# Patient Record
Sex: MALE | Race: WHITE | NOT HISPANIC OR LATINO | Employment: FULL TIME | URBAN - METROPOLITAN AREA
[De-identification: names, ages, dates, MRNs, and addresses within clinical notes are randomized per-mention and may not be internally consistent; named-entity substitution may affect disease eponyms.]

---

## 2019-07-21 PROBLEM — S09.90XA CLOSED HEAD INJURY: Status: ACTIVE | Noted: 2019-07-21

## 2019-07-22 ENCOUNTER — TELEPHONE (OUTPATIENT)
Dept: NEUROSURGERY | Facility: CLINIC | Age: 59
End: 2019-07-22

## 2019-07-22 DIAGNOSIS — I62.9 INTRACRANIAL HEMORRHAGE: ICD-10-CM

## 2019-07-22 PROBLEM — S06.30AA: Status: ACTIVE | Noted: 2019-07-22

## 2019-07-22 PROBLEM — E66.9 OBESITY: Status: ACTIVE | Noted: 2019-07-22

## 2019-07-29 ENCOUNTER — HOSPITAL ENCOUNTER (OUTPATIENT)
Dept: RADIOLOGY | Facility: HOSPITAL | Age: 59
Discharge: HOME | End: 2019-07-29
Attending: NEUROLOGICAL SURGERY
Payer: COMMERCIAL

## 2019-07-29 DIAGNOSIS — I62.9 INTRACRANIAL HEMORRHAGE: ICD-10-CM

## 2019-07-29 PROCEDURE — 70450 CT HEAD/BRAIN W/O DYE: CPT | Mod: TC

## 2019-07-29 PROCEDURE — 70450 CT HEAD/BRAIN W/O DYE: CPT | Mod: 26,,, | Performed by: RADIOLOGY

## 2019-07-29 PROCEDURE — 70450 CT HEAD WITHOUT CONTRAST: ICD-10-PCS | Mod: 26,,, | Performed by: RADIOLOGY

## 2019-08-20 ENCOUNTER — TELEPHONE (OUTPATIENT)
Dept: NEUROSURGERY | Facility: CLINIC | Age: 59
End: 2019-08-20

## 2019-08-20 NOTE — TELEPHONE ENCOUNTER
This patient was seen in the hospital for intracranial hemorrhage. It looks like his last CT scan results were reviewed with him and he did not warrant any neurosurgical intervention at discharge. However, the pt and family aretiff asking about a repeat CT scan. I explained that there was no follow up required per the note, but I would ask you as well. Please advise. Thank you!

## 2019-08-20 NOTE — TELEPHONE ENCOUNTER
Patient had a repeat scan on 7/29 and Valarie called the patient with the results. No further imaging or follow-up needed.

## 2019-09-24 DIAGNOSIS — F32.A DEPRESSION, UNSPECIFIED DEPRESSION TYPE: Primary | ICD-10-CM

## 2019-09-24 RX ORDER — BUPROPION HYDROCHLORIDE 150 MG/1
150 TABLET ORAL DAILY
Qty: 30 TABLET | Refills: 2 | Status: SHIPPED | OUTPATIENT
Start: 2019-09-24 | End: 2019-12-30 | Stop reason: SDUPTHER

## 2019-12-30 DIAGNOSIS — F32.A DEPRESSION, UNSPECIFIED DEPRESSION TYPE: ICD-10-CM

## 2019-12-30 RX ORDER — BUPROPION HYDROCHLORIDE 150 MG/1
150 TABLET ORAL DAILY
Qty: 30 TABLET | Refills: 0 | Status: SHIPPED | OUTPATIENT
Start: 2019-12-30 | End: 2020-01-06

## 2019-12-30 NOTE — TELEPHONE ENCOUNTER
----- Message from Kathy Rolon sent at 12/30/2019 12:20 PM CST -----  Refill for Wellbutrin 150 mg,  Refill for the medicine that takes place of the Losartan because they are always out of the Losartan. [facility] on [address]. Pt #[phone]

## 2020-01-06 ENCOUNTER — OFFICE VISIT (OUTPATIENT)
Dept: FAMILY MEDICINE | Facility: CLINIC | Age: 60
End: 2020-01-06
Payer: COMMERCIAL

## 2020-01-06 VITALS
SYSTOLIC BLOOD PRESSURE: 154 MMHG | HEART RATE: 60 BPM | BODY MASS INDEX: 40.43 KG/M2 | HEIGHT: 74 IN | WEIGHT: 315 LBS | DIASTOLIC BLOOD PRESSURE: 88 MMHG

## 2020-01-06 DIAGNOSIS — I10 HYPERTENSION, UNSPECIFIED TYPE: ICD-10-CM

## 2020-01-06 DIAGNOSIS — F32.A DEPRESSIVE DISORDER: ICD-10-CM

## 2020-01-06 DIAGNOSIS — Z12.11 COLON CANCER SCREENING: Primary | ICD-10-CM

## 2020-01-06 DIAGNOSIS — I25.10 CORONARY ARTERY DISEASE, ANGINA PRESENCE UNSPECIFIED, UNSPECIFIED VESSEL OR LESION TYPE, UNSPECIFIED WHETHER NATIVE OR TRANSPLANTED HEART: ICD-10-CM

## 2020-01-06 DIAGNOSIS — E66.9 OBESITY, UNSPECIFIED CLASSIFICATION, UNSPECIFIED OBESITY TYPE, UNSPECIFIED WHETHER SERIOUS COMORBIDITY PRESENT: ICD-10-CM

## 2020-01-06 PROCEDURE — 3079F DIAST BP 80-89 MM HG: CPT | Mod: S$GLB,,, | Performed by: NURSE PRACTITIONER

## 2020-01-06 PROCEDURE — 99214 OFFICE O/P EST MOD 30 MIN: CPT | Mod: S$GLB,,, | Performed by: NURSE PRACTITIONER

## 2020-01-06 PROCEDURE — 3008F PR BODY MASS INDEX (BMI) DOCUMENTED: ICD-10-PCS | Mod: S$GLB,,, | Performed by: NURSE PRACTITIONER

## 2020-01-06 PROCEDURE — 3077F PR MOST RECENT SYSTOLIC BLOOD PRESSURE >= 140 MM HG: ICD-10-PCS | Mod: S$GLB,,, | Performed by: NURSE PRACTITIONER

## 2020-01-06 PROCEDURE — 3077F SYST BP >= 140 MM HG: CPT | Mod: S$GLB,,, | Performed by: NURSE PRACTITIONER

## 2020-01-06 PROCEDURE — 3008F BODY MASS INDEX DOCD: CPT | Mod: S$GLB,,, | Performed by: NURSE PRACTITIONER

## 2020-01-06 PROCEDURE — 99214 PR OFFICE/OUTPT VISIT, EST, LEVL IV, 30-39 MIN: ICD-10-PCS | Mod: S$GLB,,, | Performed by: NURSE PRACTITIONER

## 2020-01-06 PROCEDURE — 3079F PR MOST RECENT DIASTOLIC BLOOD PRESSURE 80-89 MM HG: ICD-10-PCS | Mod: S$GLB,,, | Performed by: NURSE PRACTITIONER

## 2020-01-06 RX ORDER — METOPROLOL SUCCINATE 25 MG/1
1 TABLET, EXTENDED RELEASE ORAL NIGHTLY
COMMUNITY
Start: 2019-12-28 | End: 2020-03-24 | Stop reason: SDUPTHER

## 2020-01-06 RX ORDER — BUPROPION HYDROCHLORIDE 150 MG/1
150 TABLET ORAL 2 TIMES DAILY
Qty: 180 TABLET | Refills: 2 | Status: SHIPPED | OUTPATIENT
Start: 2020-01-06 | End: 2020-05-27 | Stop reason: SDUPTHER

## 2020-01-06 RX ORDER — ATORVASTATIN CALCIUM 80 MG/1
1 TABLET, FILM COATED ORAL DAILY
COMMUNITY
Start: 2019-12-28 | End: 2020-03-02 | Stop reason: CLARIF

## 2020-01-06 RX ORDER — ASPIRIN 81 MG/1
TABLET ORAL
COMMUNITY

## 2020-01-06 RX ORDER — LOSARTAN POTASSIUM AND HYDROCHLOROTHIAZIDE 12.5; 5 MG/1; MG/1
0.05 TABLET ORAL DAILY
COMMUNITY
Start: 2019-10-28 | End: 2021-01-20 | Stop reason: SDUPTHER

## 2020-01-06 RX ORDER — LOSARTAN POTASSIUM 50 MG/1
50 TABLET ORAL DAILY
Qty: 90 TABLET | Refills: 3 | Status: SHIPPED | OUTPATIENT
Start: 2020-01-06 | End: 2020-03-04 | Stop reason: SDUPTHER

## 2020-01-06 RX ORDER — HYDROCHLOROTHIAZIDE 12.5 MG/1
12.5 TABLET ORAL DAILY
Qty: 90 TABLET | Refills: 3 | Status: SHIPPED | OUTPATIENT
Start: 2020-01-06 | End: 2020-03-04 | Stop reason: SDUPTHER

## 2020-01-06 NOTE — PROGRESS NOTES
SUBJECTIVE:    Patient ID: Dayday Rodriguez is a 59 y.o. male.    Chief Complaint: Follow-up (no bottles, wants to do Cologuard// SW)    59 year old presents for check up. Wife present during the exam. Reports has not been taking medication  As prescribed. Reports started new job and did not have insurance x 3 months. Sleeps ok. Wife reports patient being irritable at times. Not as bad as initially but still noticing.       Office Visit on 01/06/2020   Component Date Value Ref Range Status    WBC 01/06/2020 7.1  3.8 - 10.8 Thousand/uL Final    RBC 01/06/2020 5.60  4.20 - 5.80 Million/uL Final    Hemoglobin 01/06/2020 16.7  13.2 - 17.1 g/dL Final    Hematocrit 01/06/2020 48.3  38.5 - 50.0 % Final    Mean Corpuscular Volume 01/06/2020 86.3  80.0 - 100.0 fL Final    Mean Corpuscular Hemoglobin 01/06/2020 29.8  27.0 - 33.0 pg Final    Mean Corpuscular Hemoglobin Conc 01/06/2020 34.6  32.0 - 36.0 g/dL Final    RDW 01/06/2020 13.3  11.0 - 15.0 % Final    Platelets 01/06/2020 217  140 - 400 Thousand/uL Final    MPV 01/06/2020 10.9  7.5 - 12.5 fL Final    Neutrophils Absolute 01/06/2020 4,651  1,500 - 7,800 cells/uL Final    Lymph # 01/06/2020 1,754  850 - 3,900 cells/uL Final    Mono # 01/06/2020 625  200 - 950 cells/uL Final    Eos # 01/06/2020 43  15 - 500 cells/uL Final    Baso # 01/06/2020 28  0 - 200 cells/uL Final    Neutrophils Relative 01/06/2020 65.5  % Final    Lymph% 01/06/2020 24.7  % Final    Mono% 01/06/2020 8.8  % Final    Eosinophil% 01/06/2020 0.6  % Final    Basophil% 01/06/2020 0.4  % Final    Glucose 01/06/2020 100* 65 - 99 mg/dL Final    BUN, Bld 01/06/2020 15  7 - 25 mg/dL Final    Creatinine 01/06/2020 0.94  0.70 - 1.33 mg/dL Final    eGFR if non African American 01/06/2020 88  > OR = 60 mL/min/1.73m2 Final    eGFR if African American 01/06/2020 102  > OR = 60 mL/min/1.73m2 Final    BUN/Creatinine Ratio 01/06/2020 NOT APPLICABLE  6 - 22 (calc) Final    Sodium 01/06/2020  138  135 - 146 mmol/L Final    Potassium 01/06/2020 4.5  3.5 - 5.3 mmol/L Final    Chloride 01/06/2020 102  98 - 110 mmol/L Final    CO2 01/06/2020 26  20 - 32 mmol/L Final    Calcium 01/06/2020 9.2  8.6 - 10.3 mg/dL Final    Total Protein 01/06/2020 6.8  6.1 - 8.1 g/dL Final    Albumin 01/06/2020 4.2  3.6 - 5.1 g/dL Final    Globulin, Total 01/06/2020 2.6  1.9 - 3.7 g/dL (calc) Final    Albumin/Globulin Ratio 01/06/2020 1.6  1.0 - 2.5 (calc) Final    Total Bilirubin 01/06/2020 0.5  0.2 - 1.2 mg/dL Final    Alkaline Phosphatase 01/06/2020 123* 40 - 115 U/L Final    AST 01/06/2020 25  10 - 35 U/L Final    ALT 01/06/2020 45  9 - 46 U/L Final    Cholesterol 01/06/2020 228* <200 mg/dL Final    HDL 01/06/2020 46  >40 mg/dL Final    Triglycerides 01/06/2020 98  <150 mg/dL Final    LDL Cholesterol 01/06/2020 161* mg/dL (calc) Final    Hdl/Cholesterol Ratio 01/06/2020 5.0* <5.0 (calc) Final    Non HDL Chol. (LDL+VLDL) 01/06/2020 182* <130 mg/dL (calc) Final    Color, UA 01/06/2020 YELLOW  YELLOW Final    Appearance, UA 01/06/2020 CLEAR  CLEAR Final    Specific [place], UA 01/06/2020 1.024  1.001 - 1.035 Final    pH, UA 01/06/2020 7.0  5.0 - 8.0 Final    Glucose, UA 01/06/2020 NEGATIVE  NEGATIVE Final    Bilirubin, UA 01/06/2020 NEGATIVE  NEGATIVE Final    Ketones, UA 01/06/2020 NEGATIVE  NEGATIVE Final    Occult Blood UA 01/06/2020 NEGATIVE  NEGATIVE Final    Protein, UA 01/06/2020 NEGATIVE  NEGATIVE Final    Nitrite, UA 01/06/2020 NEGATIVE  NEGATIVE Final    Leukocytes, UA 01/06/2020 NEGATIVE  NEGATIVE Final    WBC Casts, UA 01/06/2020 NONE SEEN  < OR = 5 /HPF Final    RBC Casts, UA 01/06/2020 0-2  < OR = 2 /HPF Final    Squam Epithel, UA 01/06/2020 NONE SEEN  < OR = 5 /HPF Final    Bacteria, UA 01/06/2020 NONE SEEN  NONE SEEN /HPF Final    Hyaline Casts, UA 01/06/2020 NONE SEEN  NONE SEEN /LPF Final    Reflexive Urine Culture 01/06/2020 NO CULTURE INDICATED   Final    PROSTATE SPECIFIC  ANTIGEN, SCR - Q* 01/06/2020 1.5  < OR = 4.0 ng/mL Final    TSH w/reflex to FT4 01/06/2020 2.76  0.40 - 4.50 mIU/L Final    Creatinine, Random Ur 01/06/2020 172  20 - 320 mg/dL Final    Microalb, Ur 01/06/2020 0.4  See Note: mg/dL Final    Microalb Creat Ratio 01/06/2020 2  <30 mcg/mg creat Final   Admission on 07/21/2019, Discharged on 07/22/2019   Component Date Value Ref Range Status    WBC 07/22/2019 6.41  3.90 - 12.70 K/uL Final    RBC 07/22/2019 5.74  4.60 - 6.20 M/uL Final    Hemoglobin 07/22/2019 17.4  14.0 - 18.0 g/dL Final    Hematocrit 07/22/2019 50.3  40.0 - 54.0 % Final    Mean Corpuscular Volume 07/22/2019 88  82 - 98 fL Final    Mean Corpuscular Hemoglobin 07/22/2019 30.3  27.0 - 31.0 pg Final    Mean Corpuscular Hemoglobin Conc 07/22/2019 34.6  32.0 - 36.0 g/dL Final    RDW 07/22/2019 13.4  11.5 - 14.5 % Final    Platelets 07/22/2019 184  150 - 350 K/uL Final    MPV 07/22/2019 10.2  9.2 - 12.9 fL Final    Immature Granulocytes 07/22/2019 0.2  0.0 - 0.5 % Final    Gran # (ANC) 07/22/2019 4.1  1.8 - 7.7 K/uL Final    Immature Grans (Abs) 07/22/2019 0.01  0.00 - 0.04 K/uL Final    Lymph # 07/22/2019 1.7  1.0 - 4.8 K/uL Final    Mono # 07/22/2019 0.6  0.3 - 1.0 K/uL Final    Eos # 07/22/2019 0.1  0.0 - 0.5 K/uL Final    Baso # 07/22/2019 0.03  0.00 - 0.20 K/uL Final    nRBC 07/22/2019 0  0 /100 WBC Final    Gran% 07/22/2019 63.4  38.0 - 73.0 % Final    Lymph% 07/22/2019 26.2  18.0 - 48.0 % Final    Mono% 07/22/2019 8.9  4.0 - 15.0 % Final    Eosinophil% 07/22/2019 0.8  0.0 - 8.0 % Final    Basophil% 07/22/2019 0.5  0.0 - 1.9 % Final    Differential Method 07/22/2019 Automated   Final    Sodium 07/22/2019 138  136 - 145 mmol/L Final    Potassium 07/22/2019 4.3  3.5 - 5.1 mmol/L Final    Chloride 07/22/2019 100  95 - 110 mmol/L Final    CO2 07/22/2019 32* 22 - 31 mmol/L Final    Glucose 07/22/2019 107  70 - 110 mg/dL Final    BUN, Bld 07/22/2019 16  9 - 21 mg/dL Final     Creatinine 07/22/2019 0.88  0.50 - 1.40 mg/dL Final    Calcium 07/22/2019 9.4  8.4 - 10.2 mg/dL Final    Anion Gap 07/22/2019 6* 8 - 16 mmol/L Final    eGFR if African American 07/22/2019 >60  >60 mL/min/1.73 m^2 Final    eGFR if non African American 07/22/2019 >60  >60 mL/min/1.73 m^2 Final    PT 07/22/2019 13.9  11.8 - 14.7 sec Final    INR 07/22/2019 1.1   Final    aPTT 07/22/2019 31.4  24.6 - 36.7 sec Final       Past Medical History:   Diagnosis Date    Coronary artery disease     Diabetes mellitus 07/1922    pt on metformin in past, states DM resolved so no longer taking    Hypertension      History reviewed. No pertinent surgical history.  History reviewed. No pertinent family history.    Marital Status:   Alcohol History:  reports that he drank alcohol.  Tobacco History:  reports that he has never smoked. He has never used smokeless tobacco.  Drug History:  reports that he does not use drugs.    Review of patient's allergies indicates:  No Known Allergies    Current Outpatient Medications:     aspirin (ECOTRIN) 81 MG EC tablet, Aspir-81 mg tablet,delayed release  Take 1 tablet every day by oral route., Disp: , Rfl:     atorvastatin (LIPITOR) 80 MG tablet, Take 1 tablet by mouth once daily., Disp: , Rfl:     losartan-hydrochlorothiazide 50-12.5 mg (HYZAAR) 50-12.5 mg per tablet, Take 0.05 tablets by mouth once daily., Disp: , Rfl:     metoprolol succinate (TOPROL-XL) 25 MG 24 hr tablet, Take 1 tablet by mouth nightly., Disp: , Rfl:     buPROPion (WELLBUTRIN XL) 150 MG TB24 tablet, Take 1 tablet (150 mg total) by mouth 2 (two) times daily., Disp: 180 tablet, Rfl: 2    hydroCHLOROthiazide (HYDRODIURIL) 12.5 MG Tab, Take 1 tablet (12.5 mg total) by mouth once daily., Disp: 90 tablet, Rfl: 3    levETIRAcetam (KEPPRA) 500 MG Tab, Take 1 tablet (500 mg total) by mouth 2 (two) times daily. for 7 days, Disp: 14 tablet, Rfl: 0    losartan (COZAAR) 50 MG tablet, Take 1 tablet (50 mg total) by  "mouth once daily., Disp: 90 tablet, Rfl: 3    Review of Systems   Constitutional: Negative for fatigue, fever and unexpected weight change.   HENT: Negative for ear pain, sinus pressure and sore throat.    Eyes: Negative for pain.   Respiratory: Negative for cough and shortness of breath.    Cardiovascular: Negative for chest pain and leg swelling.   Gastrointestinal: Negative for abdominal pain, constipation, nausea and vomiting.   Genitourinary: Negative for dysuria, frequency and urgency.   Musculoskeletal: Negative for arthralgias.   Skin: Negative for rash.   Neurological: Negative for dizziness, weakness and headaches.   Psychiatric/Behavioral: Positive for agitation. Negative for sleep disturbance.          Objective:      Vitals:    01/06/20 0928 01/06/20 0941   BP: (!) 150/84 (!) 154/88   Pulse: 60    Weight: (!) 151 kg (333 lb)    Height: 6' 2" (1.88 m)      Body mass index is 42.75 kg/m².  Physical Exam   Constitutional: He is oriented to person, place, and time. He appears well-developed and well-nourished.   HENT:   Head: Normocephalic and atraumatic.   Right Ear: External ear normal.   Left Ear: External ear normal.   Mouth/Throat: Oropharynx is clear and moist.   Eyes: Pupils are equal, round, and reactive to light.   Neck: Neck supple. No tracheal deviation present.   Cardiovascular: Normal rate and regular rhythm. Exam reveals no gallop and no friction rub.   No murmur heard.  Pulmonary/Chest: Breath sounds normal. No stridor. He has no wheezes. He has no rales.   Abdominal: Soft. He exhibits no mass. There is no tenderness.   Musculoskeletal: He exhibits no edema, tenderness or deformity.   Lymphadenopathy:     He has no cervical adenopathy.   Neurological: He is alert and oriented to person, place, and time. Coordination normal.   Skin: Skin is warm and dry.   Psychiatric: He has a normal mood and affect. Thought content normal.         Assessment:       1. Colon cancer screening    2. " Hypertension, unspecified type    3. Coronary artery disease, angina presence unspecified, unspecified vessel or lesion type, unspecified whether native or transplanted heart    4. Obesity, unspecified classification, unspecified obesity type, unspecified whether serious comorbidity present    5. Depressive disorder         Plan:       Colon cancer screening  -     Cologuard Screening (Multitarget Stool DNA); Future; Expected date: 01/06/2020    Hypertension, unspecified type  Comments:  monitor bp daily. resume previous meds. readings to next ov.   Orders:  -     losartan (COZAAR) 50 MG tablet; Take 1 tablet (50 mg total) by mouth once daily.  Dispense: 90 tablet; Refill: 3  -     hydroCHLOROthiazide (HYDRODIURIL) 12.5 MG Tab; Take 1 tablet (12.5 mg total) by mouth once daily.  Dispense: 90 tablet; Refill: 3  -     CBC auto differential; Future; Expected date: 01/06/2020  -     Comprehensive metabolic panel; Future; Expected date: 01/06/2020  -     Lipid panel; Future; Expected date: 01/06/2020  -     Urinalysis, Reflex to Urine Culture Urine, Clean Catch; Future; Expected date: 01/06/2020  -     PSA, Screening; Future; Expected date: 01/06/2020  -     TSH w/reflex to FT4; Future; Expected date: 01/06/2020  -     Microalbumin/creatinine urine ratio; Future; Expected date: 01/06/2020    Coronary artery disease, angina presence unspecified, unspecified vessel or lesion type, unspecified whether native or transplanted heart    Obesity, unspecified classification, unspecified obesity type, unspecified whether serious comorbidity present  -     CBC auto differential; Future; Expected date: 01/06/2020  -     Comprehensive metabolic panel; Future; Expected date: 01/06/2020  -     Lipid panel; Future; Expected date: 01/06/2020  -     Urinalysis, Reflex to Urine Culture Urine, Clean Catch; Future; Expected date: 01/06/2020  -     PSA, Screening; Future; Expected date: 01/06/2020  -     TSH w/reflex to FT4; Future; Expected  date: 01/06/2020    Depressive disorder  Comments:  increase wellbutrin to 2 x per day    Other orders  -     buPROPion (WELLBUTRIN XL) 150 MG TB24 tablet; Take 1 tablet (150 mg total) by mouth 2 (two) times daily.  Dispense: 180 tablet; Refill: 2      Follow up in about 2 months (around 3/6/2020) for Follow up.

## 2020-01-07 LAB
ALBUMIN SERPL-MCNC: 4.2 G/DL (ref 3.6–5.1)
ALBUMIN/CREAT UR: 2 MCG/MG CREAT
ALBUMIN/GLOB SERPL: 1.6 (CALC) (ref 1–2.5)
ALP SERPL-CCNC: 123 U/L (ref 40–115)
ALT SERPL-CCNC: 45 U/L (ref 9–46)
APPEARANCE UR: CLEAR
AST SERPL-CCNC: 25 U/L (ref 10–35)
BACTERIA #/AREA URNS HPF: NORMAL /HPF
BACTERIA UR CULT: NORMAL
BASOPHILS # BLD AUTO: 28 CELLS/UL (ref 0–200)
BASOPHILS NFR BLD AUTO: 0.4 %
BILIRUB SERPL-MCNC: 0.5 MG/DL (ref 0.2–1.2)
BILIRUB UR QL STRIP: NEGATIVE
BUN SERPL-MCNC: 15 MG/DL (ref 7–25)
BUN/CREAT SERPL: ABNORMAL (CALC) (ref 6–22)
CALCIUM SERPL-MCNC: 9.2 MG/DL (ref 8.6–10.3)
CHLORIDE SERPL-SCNC: 102 MMOL/L (ref 98–110)
CHOLEST SERPL-MCNC: 228 MG/DL
CHOLEST/HDLC SERPL: 5 (CALC)
CO2 SERPL-SCNC: 26 MMOL/L (ref 20–32)
COLOR UR: YELLOW
CREAT SERPL-MCNC: 0.94 MG/DL (ref 0.7–1.33)
CREAT UR-MCNC: 172 MG/DL (ref 20–320)
EOSINOPHIL # BLD AUTO: 43 CELLS/UL (ref 15–500)
EOSINOPHIL NFR BLD AUTO: 0.6 %
ERYTHROCYTE [DISTWIDTH] IN BLOOD BY AUTOMATED COUNT: 13.3 % (ref 11–15)
GFRSERPLBLD MDRD-ARVRAT: 88 ML/MIN/1.73M2
GLOBULIN SER CALC-MCNC: 2.6 G/DL (CALC) (ref 1.9–3.7)
GLUCOSE SERPL-MCNC: 100 MG/DL (ref 65–99)
GLUCOSE UR QL STRIP: NEGATIVE
HCT VFR BLD AUTO: 48.3 % (ref 38.5–50)
HDLC SERPL-MCNC: 46 MG/DL
HGB BLD-MCNC: 16.7 G/DL (ref 13.2–17.1)
HGB UR QL STRIP: NEGATIVE
HYALINE CASTS #/AREA URNS LPF: NORMAL /LPF
KETONES UR QL STRIP: NEGATIVE
LDLC SERPL CALC-MCNC: 161 MG/DL (CALC)
LEUKOCYTE ESTERASE UR QL STRIP: NEGATIVE
LYMPHOCYTES # BLD AUTO: 1754 CELLS/UL (ref 850–3900)
LYMPHOCYTES NFR BLD AUTO: 24.7 %
MCH RBC QN AUTO: 29.8 PG (ref 27–33)
MCHC RBC AUTO-ENTMCNC: 34.6 G/DL (ref 32–36)
MCV RBC AUTO: 86.3 FL (ref 80–100)
MICROALBUMIN UR-MCNC: 0.4 MG/DL
MONOCYTES # BLD AUTO: 625 CELLS/UL (ref 200–950)
MONOCYTES NFR BLD AUTO: 8.8 %
NEUTROPHILS # BLD AUTO: 4651 CELLS/UL (ref 1500–7800)
NEUTROPHILS NFR BLD AUTO: 65.5 %
NITRITE UR QL STRIP: NEGATIVE
NONHDLC SERPL-MCNC: 182 MG/DL (CALC)
PH UR STRIP: 7 [PH] (ref 5–8)
PLATELET # BLD AUTO: 217 THOUSAND/UL (ref 140–400)
PMV BLD REES-ECKER: 10.9 FL (ref 7.5–12.5)
POTASSIUM SERPL-SCNC: 4.5 MMOL/L (ref 3.5–5.3)
PROT SERPL-MCNC: 6.8 G/DL (ref 6.1–8.1)
PROT UR QL STRIP: NEGATIVE
PSA SERPL-MCNC: 1.5 NG/ML
RBC # BLD AUTO: 5.6 MILLION/UL (ref 4.2–5.8)
RBC #/AREA URNS HPF: NORMAL /HPF
SODIUM SERPL-SCNC: 138 MMOL/L (ref 135–146)
SP GR UR STRIP: 1.02 (ref 1–1.03)
SQUAMOUS #/AREA URNS HPF: NORMAL /HPF
TRIGL SERPL-MCNC: 98 MG/DL
TSH SERPL-ACNC: 2.76 MIU/L (ref 0.4–4.5)
WBC # BLD AUTO: 7.1 THOUSAND/UL (ref 3.8–10.8)
WBC #/AREA URNS HPF: NORMAL /HPF

## 2020-01-11 PROBLEM — F32.A DEPRESSIVE DISORDER: Status: ACTIVE | Noted: 2017-03-16

## 2020-01-30 ENCOUNTER — TELEPHONE (OUTPATIENT)
Dept: FAMILY MEDICINE | Facility: CLINIC | Age: 60
End: 2020-01-30

## 2020-01-30 DIAGNOSIS — E78.5 HYPERLIPIDEMIA, UNSPECIFIED HYPERLIPIDEMIA TYPE: Primary | ICD-10-CM

## 2020-01-30 RX ORDER — ROSUVASTATIN CALCIUM 40 MG/1
40 TABLET, COATED ORAL NIGHTLY
Qty: 90 TABLET | Refills: 3 | Status: SHIPPED | OUTPATIENT
Start: 2020-01-30 | End: 2021-05-04 | Stop reason: SDUPTHER

## 2020-01-30 NOTE — PROGRESS NOTES
Spoke to patient with information from Melissa. States that he is taking cholesterol medication every day as prescribed, he couldn't remember name to verify.

## 2020-01-30 NOTE — PROGRESS NOTES
Spoke to patient with information per Melissa. Verbalized understanding. Remind me created for lab.    Per Melissa:  Change lipitor to crestor 40mg repeat lipids and cmp in 2 months.

## 2020-01-30 NOTE — TELEPHONE ENCOUNTER
----- Message from Melissa Thurston NP sent at 1/29/2020  2:01 PM CST -----  Your cholesterol is HIGH. Are you taking lipitor daily.?? Rest of labs are within normal limits.

## 2020-03-02 ENCOUNTER — OFFICE VISIT (OUTPATIENT)
Dept: FAMILY MEDICINE | Facility: CLINIC | Age: 60
End: 2020-03-02
Payer: COMMERCIAL

## 2020-03-02 VITALS
WEIGHT: 315 LBS | DIASTOLIC BLOOD PRESSURE: 86 MMHG | BODY MASS INDEX: 40.43 KG/M2 | HEART RATE: 76 BPM | HEIGHT: 74 IN | SYSTOLIC BLOOD PRESSURE: 138 MMHG

## 2020-03-02 DIAGNOSIS — I10 HYPERTENSION, UNSPECIFIED TYPE: ICD-10-CM

## 2020-03-02 DIAGNOSIS — I25.10 CORONARY ARTERY DISEASE, ANGINA PRESENCE UNSPECIFIED, UNSPECIFIED VESSEL OR LESION TYPE, UNSPECIFIED WHETHER NATIVE OR TRANSPLANTED HEART: ICD-10-CM

## 2020-03-02 DIAGNOSIS — N52.9 ERECTILE DYSFUNCTION, UNSPECIFIED ERECTILE DYSFUNCTION TYPE: Primary | ICD-10-CM

## 2020-03-02 DIAGNOSIS — F32.A DEPRESSIVE DISORDER: ICD-10-CM

## 2020-03-02 DIAGNOSIS — E66.9 OBESITY, UNSPECIFIED CLASSIFICATION, UNSPECIFIED OBESITY TYPE, UNSPECIFIED WHETHER SERIOUS COMORBIDITY PRESENT: ICD-10-CM

## 2020-03-02 PROCEDURE — 3008F BODY MASS INDEX DOCD: CPT | Mod: S$GLB,,, | Performed by: NURSE PRACTITIONER

## 2020-03-02 PROCEDURE — 3079F PR MOST RECENT DIASTOLIC BLOOD PRESSURE 80-89 MM HG: ICD-10-PCS | Mod: S$GLB,,, | Performed by: NURSE PRACTITIONER

## 2020-03-02 PROCEDURE — 3075F SYST BP GE 130 - 139MM HG: CPT | Mod: S$GLB,,, | Performed by: NURSE PRACTITIONER

## 2020-03-02 PROCEDURE — 99214 PR OFFICE/OUTPT VISIT, EST, LEVL IV, 30-39 MIN: ICD-10-PCS | Mod: S$GLB,,, | Performed by: NURSE PRACTITIONER

## 2020-03-02 PROCEDURE — 3075F PR MOST RECENT SYSTOLIC BLOOD PRESS GE 130-139MM HG: ICD-10-PCS | Mod: S$GLB,,, | Performed by: NURSE PRACTITIONER

## 2020-03-02 PROCEDURE — 3008F PR BODY MASS INDEX (BMI) DOCUMENTED: ICD-10-PCS | Mod: S$GLB,,, | Performed by: NURSE PRACTITIONER

## 2020-03-02 PROCEDURE — 99214 OFFICE O/P EST MOD 30 MIN: CPT | Mod: S$GLB,,, | Performed by: NURSE PRACTITIONER

## 2020-03-02 PROCEDURE — 3079F DIAST BP 80-89 MM HG: CPT | Mod: S$GLB,,, | Performed by: NURSE PRACTITIONER

## 2020-03-04 PROBLEM — I10 HYPERTENSION: Status: ACTIVE | Noted: 2020-03-04

## 2020-03-04 RX ORDER — HYDROCHLOROTHIAZIDE 12.5 MG/1
12.5 TABLET ORAL DAILY
Qty: 90 TABLET | Refills: 3 | Status: SHIPPED | OUTPATIENT
Start: 2020-03-04 | End: 2021-10-12

## 2020-03-04 RX ORDER — LOSARTAN POTASSIUM 50 MG/1
50 TABLET ORAL DAILY
Qty: 90 TABLET | Refills: 3 | Status: SHIPPED | OUTPATIENT
Start: 2020-03-04 | End: 2021-10-12

## 2020-03-04 NOTE — PROGRESS NOTES
SUBJECTIVE:    Patient ID: Dayday Rodriguez is a 59 y.o. male.    Chief Complaint: Hypertension (2 month follow up.....NO med bottles.....mlr)    59-year-old male presents for check up.  At last visit Wellbutrin was increased due to agitation.  Patient did increase medication but not sure of has made much of a difference.  Wife is not present during exam.  Taking blood pressure medications as prescribed.  Does not check blood pressure routinely at home.  Under some stress due to new job but adjusting.  Due for labs.  Reports having trouble with being able to maintain an erection.  Admits to no trouble actually achieving but unable to maintain after couple minutes.  Does report previously taking testosterone injections but high has not recently      Office Visit on 03/02/2020   Component Date Value Ref Range Status    Cholesterol 03/02/2020 192  <200 mg/dL Final    HDL 03/02/2020 47  > OR = 40 mg/dL Final    Triglycerides 03/02/2020 74  <150 mg/dL Final    LDL Cholesterol 03/02/2020 128* mg/dL (calc) Final    Hdl/Cholesterol Ratio 03/02/2020 4.1  <5.0 (calc) Final    Non HDL Chol. (LDL+VLDL) 03/02/2020 145* <130 mg/dL (calc) Final    Glucose 03/02/2020 129* 65 - 99 mg/dL Final    BUN, Bld 03/02/2020 19  7 - 25 mg/dL Final    Creatinine 03/02/2020 1.15  0.70 - 1.33 mg/dL Final    eGFR if non African American 03/02/2020 69  > OR = 60 mL/min/1.73m2 Final    eGFR if African American 03/02/2020 80  > OR = 60 mL/min/1.73m2 Final    BUN/Creatinine Ratio 03/02/2020 NOT APPLICABLE  6 - 22 (calc) Final    Sodium 03/02/2020 140  135 - 146 mmol/L Final    Potassium 03/02/2020 4.4  3.5 - 5.3 mmol/L Final    Chloride 03/02/2020 100  98 - 110 mmol/L Final    CO2 03/02/2020 33* 20 - 32 mmol/L Final    Calcium 03/02/2020 9.9  8.6 - 10.3 mg/dL Final    Total Protein 03/02/2020 6.8  6.1 - 8.1 g/dL Final    Albumin 03/02/2020 4.3  3.6 - 5.1 g/dL Final    Globulin, Total 03/02/2020 2.5  1.9 - 3.7 g/dL (calc) Final     Albumin/Globulin Ratio 03/02/2020 1.7  1.0 - 2.5 (calc) Final    Total Bilirubin 03/02/2020 0.5  0.2 - 1.2 mg/dL Final    Alkaline Phosphatase 03/02/2020 118  35 - 144 U/L Final    AST 03/02/2020 27  10 - 35 U/L Final    ALT 03/02/2020 43  9 - 46 U/L Final   Office Visit on 01/06/2020   Component Date Value Ref Range Status    WBC 01/06/2020 7.1  3.8 - 10.8 Thousand/uL Final    RBC 01/06/2020 5.60  4.20 - 5.80 Million/uL Final    Hemoglobin 01/06/2020 16.7  13.2 - 17.1 g/dL Final    Hematocrit 01/06/2020 48.3  38.5 - 50.0 % Final    Mean Corpuscular Volume 01/06/2020 86.3  80.0 - 100.0 fL Final    Mean Corpuscular Hemoglobin 01/06/2020 29.8  27.0 - 33.0 pg Final    Mean Corpuscular Hemoglobin Conc 01/06/2020 34.6  32.0 - 36.0 g/dL Final    RDW 01/06/2020 13.3  11.0 - 15.0 % Final    Platelets 01/06/2020 217  140 - 400 Thousand/uL Final    MPV 01/06/2020 10.9  7.5 - 12.5 fL Final    Neutrophils Absolute 01/06/2020 4,651  1,500 - 7,800 cells/uL Final    Lymph # 01/06/2020 1,754  850 - 3,900 cells/uL Final    Mono # 01/06/2020 625  200 - 950 cells/uL Final    Eos # 01/06/2020 43  15 - 500 cells/uL Final    Baso # 01/06/2020 28  0 - 200 cells/uL Final    Neutrophils Relative 01/06/2020 65.5  % Final    Lymph% 01/06/2020 24.7  % Final    Mono% 01/06/2020 8.8  % Final    Eosinophil% 01/06/2020 0.6  % Final    Basophil% 01/06/2020 0.4  % Final    Glucose 01/06/2020 100* 65 - 99 mg/dL Final    BUN, Bld 01/06/2020 15  7 - 25 mg/dL Final    Creatinine 01/06/2020 0.94  0.70 - 1.33 mg/dL Final    eGFR if non African American 01/06/2020 88  > OR = 60 mL/min/1.73m2 Final    eGFR if African American 01/06/2020 102  > OR = 60 mL/min/1.73m2 Final    BUN/Creatinine Ratio 01/06/2020 NOT APPLICABLE  6 - 22 (calc) Final    Sodium 01/06/2020 138  135 - 146 mmol/L Final    Potassium 01/06/2020 4.5  3.5 - 5.3 mmol/L Final    Chloride 01/06/2020 102  98 - 110 mmol/L Final    CO2 01/06/2020 26  20 - 32  mmol/L Final    Calcium 01/06/2020 9.2  8.6 - 10.3 mg/dL Final    Total Protein 01/06/2020 6.8  6.1 - 8.1 g/dL Final    Albumin 01/06/2020 4.2  3.6 - 5.1 g/dL Final    Globulin, Total 01/06/2020 2.6  1.9 - 3.7 g/dL (calc) Final    Albumin/Globulin Ratio 01/06/2020 1.6  1.0 - 2.5 (calc) Final    Total Bilirubin 01/06/2020 0.5  0.2 - 1.2 mg/dL Final    Alkaline Phosphatase 01/06/2020 123* 40 - 115 U/L Final    AST 01/06/2020 25  10 - 35 U/L Final    ALT 01/06/2020 45  9 - 46 U/L Final    Cholesterol 01/06/2020 228* <200 mg/dL Final    HDL 01/06/2020 46  >40 mg/dL Final    Triglycerides 01/06/2020 98  <150 mg/dL Final    LDL Cholesterol 01/06/2020 161* mg/dL (calc) Final    Hdl/Cholesterol Ratio 01/06/2020 5.0* <5.0 (calc) Final    Non HDL Chol. (LDL+VLDL) 01/06/2020 182* <130 mg/dL (calc) Final    Color, UA 01/06/2020 YELLOW  YELLOW Final    Appearance, UA 01/06/2020 CLEAR  CLEAR Final    Specific [place], UA 01/06/2020 1.024  1.001 - 1.035 Final    pH, UA 01/06/2020 7.0  5.0 - 8.0 Final    Glucose, UA 01/06/2020 NEGATIVE  NEGATIVE Final    Bilirubin, UA 01/06/2020 NEGATIVE  NEGATIVE Final    Ketones, UA 01/06/2020 NEGATIVE  NEGATIVE Final    Occult Blood UA 01/06/2020 NEGATIVE  NEGATIVE Final    Protein, UA 01/06/2020 NEGATIVE  NEGATIVE Final    Nitrite, UA 01/06/2020 NEGATIVE  NEGATIVE Final    Leukocytes, UA 01/06/2020 NEGATIVE  NEGATIVE Final    WBC Casts, UA 01/06/2020 NONE SEEN  < OR = 5 /HPF Final    RBC Casts, UA 01/06/2020 0-2  < OR = 2 /HPF Final    Squam Epithel, UA 01/06/2020 NONE SEEN  < OR = 5 /HPF Final    Bacteria, UA 01/06/2020 NONE SEEN  NONE SEEN /HPF Final    Hyaline Casts, UA 01/06/2020 NONE SEEN  NONE SEEN /LPF Final    Reflexive Urine Culture 01/06/2020 NO CULTURE INDICATED   Final    PROSTATE SPECIFIC ANTIGEN, SCR - Q* 01/06/2020 1.5  < OR = 4.0 ng/mL Final    TSH w/reflex to FT4 01/06/2020 2.76  0.40 - 4.50 mIU/L Final    Creatinine, Random Ur 01/06/2020 172   20 - 320 mg/dL Final    Microalb, Ur 01/06/2020 0.4  See Note: mg/dL Final    Microalb Creat Ratio 01/06/2020 2  <30 mcg/mg creat Final       Past Medical History:   Diagnosis Date    Coronary artery disease     Diabetes mellitus 07/1922    pt on metformin in past, states DM resolved so no longer taking    Hypertension      Past Surgical History:   Procedure Laterality Date    SHOULDER SURGERY Right 1975     History reviewed. No pertinent family history.    Marital Status:   Alcohol History:  reports that he drinks alcohol.  Tobacco History:  reports that he has quit smoking. He has never used smokeless tobacco.  Drug History:  reports that he does not use drugs.    Review of patient's allergies indicates:  No Known Allergies    Current Outpatient Medications:     aspirin (ECOTRIN) 81 MG EC tablet, Aspir-81 mg tablet,delayed release  Take 1 tablet every day by oral route., Disp: , Rfl:     buPROPion (WELLBUTRIN XL) 150 MG TB24 tablet, Take 1 tablet (150 mg total) by mouth 2 (two) times daily., Disp: 180 tablet, Rfl: 2    losartan-hydrochlorothiazide 50-12.5 mg (HYZAAR) 50-12.5 mg per tablet, Take 0.05 tablets by mouth once daily., Disp: , Rfl:     metoprolol succinate (TOPROL-XL) 25 MG 24 hr tablet, Take 1 tablet by mouth nightly., Disp: , Rfl:     rosuvastatin (CRESTOR) 40 MG Tab, Take 1 tablet (40 mg total) by mouth every evening., Disp: 90 tablet, Rfl: 3    hydroCHLOROthiazide (HYDRODIURIL) 12.5 MG Tab, Take 1 tablet (12.5 mg total) by mouth once daily., Disp: 90 tablet, Rfl: 3    losartan (COZAAR) 50 MG tablet, Take 1 tablet (50 mg total) by mouth once daily., Disp: 90 tablet, Rfl: 3    Review of Systems   Constitutional: Negative for fatigue, fever and unexpected weight change.   HENT: Negative for ear pain, sinus pressure and sore throat.    Eyes: Negative for pain.   Respiratory: Negative for cough and shortness of breath.    Cardiovascular: Negative for chest pain and leg swelling.  "  Gastrointestinal: Negative for abdominal pain, constipation, nausea and vomiting.   Genitourinary: Negative for dysuria, frequency and urgency.   Musculoskeletal: Negative for arthralgias.   Skin: Negative for rash.   Neurological: Negative for dizziness, weakness and headaches.   Psychiatric/Behavioral: Negative for sleep disturbance.          Objective:      Vitals:    03/02/20 0828   BP: 138/86   Pulse: 76   Weight: (!) 146.1 kg (322 lb)   Height: 6' 2" (1.88 m)     Body mass index is 41.34 kg/m².  Physical Exam   Constitutional: He is oriented to person, place, and time. He appears well-developed and well-nourished.   HENT:   Right Ear: External ear normal.   Left Ear: External ear normal.   Mouth/Throat: Oropharynx is clear and moist.   Eyes: Pupils are equal, round, and reactive to light.   Neck: Neck supple. No tracheal deviation present.   Cardiovascular: Normal rate and regular rhythm. Exam reveals no gallop and no friction rub.   No murmur heard.  Pulmonary/Chest: Breath sounds normal. No stridor. He has no wheezes. He has no rales.   Abdominal: Soft. He exhibits no mass. There is no tenderness.   Musculoskeletal: He exhibits no edema, tenderness or deformity.   Lymphadenopathy:     He has no cervical adenopathy.   Neurological: He is alert and oriented to person, place, and time. Coordination normal.   Skin: Skin is warm and dry.   Psychiatric: He has a normal mood and affect. Thought content normal.         Assessment:       1. Erectile dysfunction, unspecified erectile dysfunction type    2. Obesity, unspecified classification, unspecified obesity type, unspecified whether serious comorbidity present    3. Coronary artery disease, angina presence unspecified, unspecified vessel or lesion type, unspecified whether native or transplanted heart    4. Depressive disorder    5. Hypertension, unspecified type    6. Hypertension, unspecified type         Plan:       Erectile dysfunction, unspecified erectile " dysfunction type  -     Testosterone, free; Future; Expected date: 03/02/2020  -     Testosterone, Total, LC/MS/MS; Future; Expected date: 03/02/2020    Obesity, unspecified classification, unspecified obesity type, unspecified whether serious comorbidity present    Coronary artery disease, angina presence unspecified, unspecified vessel or lesion type, unspecified whether native or transplanted heart    Depressive disorder    Hypertension, unspecified type  -     hydroCHLOROthiazide (HYDRODIURIL) 12.5 MG Tab; Take 1 tablet (12.5 mg total) by mouth once daily.  Dispense: 90 tablet; Refill: 3  -     losartan (COZAAR) 50 MG tablet; Take 1 tablet (50 mg total) by mouth once daily.  Dispense: 90 tablet; Refill: 3    Hypertension, unspecified type  Comments:  monitor bp daily. resume previous meds. readings to next ov.   Orders:  -     hydroCHLOROthiazide (HYDRODIURIL) 12.5 MG Tab; Take 1 tablet (12.5 mg total) by mouth once daily.  Dispense: 90 tablet; Refill: 3  -     losartan (COZAAR) 50 MG tablet; Take 1 tablet (50 mg total) by mouth once daily.  Dispense: 90 tablet; Refill: 3    Other orders  -     Lipid panel  -     Testosterone, free  -     Comprehensive metabolic panel      Follow up in about 3 months (around 6/2/2020) for medication management.

## 2020-03-05 LAB
ALBUMIN SERPL-MCNC: 4.3 G/DL (ref 3.6–5.1)
ALBUMIN/GLOB SERPL: 1.7 (CALC) (ref 1–2.5)
ALP SERPL-CCNC: 118 U/L (ref 35–144)
ALT SERPL-CCNC: 43 U/L (ref 9–46)
AST SERPL-CCNC: 27 U/L (ref 10–35)
BILIRUB SERPL-MCNC: 0.5 MG/DL (ref 0.2–1.2)
BUN SERPL-MCNC: 19 MG/DL (ref 7–25)
BUN/CREAT SERPL: ABNORMAL (CALC) (ref 6–22)
CALCIUM SERPL-MCNC: 9.9 MG/DL (ref 8.6–10.3)
CHLORIDE SERPL-SCNC: 100 MMOL/L (ref 98–110)
CHOLEST SERPL-MCNC: 192 MG/DL
CHOLEST/HDLC SERPL: 4.1 (CALC)
CO2 SERPL-SCNC: 33 MMOL/L (ref 20–32)
CREAT SERPL-MCNC: 1.15 MG/DL (ref 0.7–1.33)
GFRSERPLBLD MDRD-ARVRAT: 69 ML/MIN/1.73M2
GLOBULIN SER CALC-MCNC: 2.5 G/DL (CALC) (ref 1.9–3.7)
GLUCOSE SERPL-MCNC: 129 MG/DL (ref 65–99)
HDLC SERPL-MCNC: 47 MG/DL
LDLC SERPL CALC-MCNC: 128 MG/DL (CALC)
NONHDLC SERPL-MCNC: 145 MG/DL (CALC)
POTASSIUM SERPL-SCNC: 4.4 MMOL/L (ref 3.5–5.3)
PROT SERPL-MCNC: 6.8 G/DL (ref 6.1–8.1)
SODIUM SERPL-SCNC: 140 MMOL/L (ref 135–146)
TESTOST FREE SERPL-MCNC: 52 PG/ML (ref 46–224)
TESTOST SERPL-MCNC: 490 NG/DL (ref 250–1100)
TRIGL SERPL-MCNC: 74 MG/DL

## 2020-03-09 ENCOUNTER — TELEPHONE (OUTPATIENT)
Dept: FAMILY MEDICINE | Facility: CLINIC | Age: 60
End: 2020-03-09

## 2020-03-09 NOTE — TELEPHONE ENCOUNTER
----- Message from Rosibel Hollis MA sent at 3/9/2020 12:34 PM CDT -----      ----- Message -----  From: Carmen Jose  Sent: 3/9/2020  12:21 PM CDT  To: Melissa Thurston Staff    LAB, [place], 3/09/20

## 2020-03-24 ENCOUNTER — PATIENT MESSAGE (OUTPATIENT)
Dept: FAMILY MEDICINE | Facility: CLINIC | Age: 60
End: 2020-03-24

## 2020-03-24 RX ORDER — SILDENAFIL 50 MG/1
50 TABLET, FILM COATED ORAL DAILY PRN
Qty: 10 TABLET | Refills: 2 | Status: SHIPPED | OUTPATIENT
Start: 2020-03-24 | End: 2020-06-02 | Stop reason: SDUPTHER

## 2020-03-24 RX ORDER — METOPROLOL SUCCINATE 25 MG/1
25 TABLET, EXTENDED RELEASE ORAL NIGHTLY
Qty: 90 TABLET | Refills: 2 | Status: SHIPPED | OUTPATIENT
Start: 2020-03-24 | End: 2021-05-04 | Stop reason: SDUPTHER

## 2020-04-18 ENCOUNTER — HOSPITAL ENCOUNTER (EMERGENCY)
Facility: HOSPITAL | Age: 60
Discharge: HOME | End: 2020-04-18
Attending: EMERGENCY MEDICINE
Payer: COMMERCIAL

## 2020-04-18 VITALS
RESPIRATION RATE: 16 BRPM | SYSTOLIC BLOOD PRESSURE: 141 MMHG | TEMPERATURE: 98 F | HEIGHT: 76 IN | DIASTOLIC BLOOD PRESSURE: 67 MMHG | OXYGEN SATURATION: 96 % | HEART RATE: 55 BPM | WEIGHT: 315 LBS | BODY MASS INDEX: 38.36 KG/M2

## 2020-04-18 DIAGNOSIS — R06.02 SOB (SHORTNESS OF BREATH): Primary | ICD-10-CM

## 2020-04-18 DIAGNOSIS — R07.9 CHEST PAIN: ICD-10-CM

## 2020-04-18 LAB
ALBUMIN SERPL BCP-MCNC: 3.9 G/DL (ref 3.5–5.2)
ALP SERPL-CCNC: 104 U/L (ref 55–135)
ALT SERPL W/O P-5'-P-CCNC: 46 U/L (ref 10–44)
ANION GAP SERPL CALC-SCNC: 11 MMOL/L (ref 8–16)
AST SERPL-CCNC: 31 U/L (ref 10–40)
BASOPHILS # BLD AUTO: 0.03 K/UL (ref 0–0.2)
BASOPHILS NFR BLD: 0.3 % (ref 0–1.9)
BILIRUB SERPL-MCNC: 1 MG/DL (ref 0.1–1)
BNP SERPL-MCNC: 25 PG/ML (ref 0–99)
BUN SERPL-MCNC: 22 MG/DL (ref 6–20)
CALCIUM SERPL-MCNC: 9.7 MG/DL (ref 8.7–10.5)
CHLORIDE SERPL-SCNC: 101 MMOL/L (ref 95–110)
CO2 SERPL-SCNC: 27 MMOL/L (ref 23–29)
CREAT SERPL-MCNC: 1.2 MG/DL (ref 0.5–1.4)
D DIMER PPP IA.FEU-MCNC: <0.27 UG/ML FEU
DIFFERENTIAL METHOD: NORMAL
EOSINOPHIL # BLD AUTO: 0.1 K/UL (ref 0–0.5)
EOSINOPHIL NFR BLD: 1 % (ref 0–8)
ERYTHROCYTE [DISTWIDTH] IN BLOOD BY AUTOMATED COUNT: 13.2 % (ref 11.5–14.5)
EST. GFR  (AFRICAN AMERICAN): >60 ML/MIN/1.73 M^2
EST. GFR  (NON AFRICAN AMERICAN): >60 ML/MIN/1.73 M^2
GLUCOSE SERPL-MCNC: 137 MG/DL (ref 70–110)
HCT VFR BLD AUTO: 46.7 % (ref 40–54)
HGB BLD-MCNC: 15.7 G/DL (ref 14–18)
IMM GRANULOCYTES # BLD AUTO: 0.02 K/UL (ref 0–0.04)
IMM GRANULOCYTES NFR BLD AUTO: 0.2 % (ref 0–0.5)
INR PPP: 1.1
LYMPHOCYTES # BLD AUTO: 2.3 K/UL (ref 1–4.8)
LYMPHOCYTES NFR BLD: 25.7 % (ref 18–48)
MAGNESIUM SERPL-MCNC: 1.8 MG/DL (ref 1.6–2.6)
MCH RBC QN AUTO: 29.2 PG (ref 27–31)
MCHC RBC AUTO-ENTMCNC: 33.6 G/DL (ref 32–36)
MCV RBC AUTO: 87 FL (ref 82–98)
MONOCYTES # BLD AUTO: 1 K/UL (ref 0.3–1)
MONOCYTES NFR BLD: 11.1 % (ref 4–15)
NEUTROPHILS # BLD AUTO: 5.5 K/UL (ref 1.8–7.7)
NEUTROPHILS NFR BLD: 61.7 % (ref 38–73)
NRBC BLD-RTO: 0 /100 WBC
PLATELET # BLD AUTO: 187 K/UL (ref 150–350)
PMV BLD AUTO: 10.4 FL (ref 9.2–12.9)
POTASSIUM SERPL-SCNC: 3.9 MMOL/L (ref 3.5–5.1)
PROT SERPL-MCNC: 7.1 G/DL (ref 6–8.4)
PROTHROMBIN TIME: 14 SEC (ref 10.6–14.8)
RBC # BLD AUTO: 5.37 M/UL (ref 4.6–6.2)
SARS-COV-2 RDRP RESP QL NAA+PROBE: NEGATIVE
SODIUM SERPL-SCNC: 139 MMOL/L (ref 136–145)
TROPONIN I SERPL DL<=0.01 NG/ML-MCNC: <0.03 NG/ML
TSH SERPL DL<=0.005 MIU/L-ACNC: 5.01 UIU/ML (ref 0.34–5.6)
WBC # BLD AUTO: 8.86 K/UL (ref 3.9–12.7)

## 2020-04-18 PROCEDURE — U0002 COVID-19 LAB TEST NON-CDC: HCPCS

## 2020-04-18 PROCEDURE — 83880 ASSAY OF NATRIURETIC PEPTIDE: CPT

## 2020-04-18 PROCEDURE — 85379 FIBRIN DEGRADATION QUANT: CPT

## 2020-04-18 PROCEDURE — 83735 ASSAY OF MAGNESIUM: CPT

## 2020-04-18 PROCEDURE — 99285 EMERGENCY DEPT VISIT HI MDM: CPT | Mod: 25

## 2020-04-18 PROCEDURE — 85610 PROTHROMBIN TIME: CPT

## 2020-04-18 PROCEDURE — 84443 ASSAY THYROID STIM HORMONE: CPT

## 2020-04-18 PROCEDURE — 93005 ELECTROCARDIOGRAM TRACING: CPT | Performed by: INTERNAL MEDICINE

## 2020-04-18 PROCEDURE — 85025 COMPLETE CBC W/AUTO DIFF WBC: CPT

## 2020-04-18 PROCEDURE — 84484 ASSAY OF TROPONIN QUANT: CPT

## 2020-04-18 PROCEDURE — 80053 COMPREHEN METABOLIC PANEL: CPT

## 2020-04-18 RX ORDER — ASPIRIN 325 MG
325 TABLET ORAL
Status: DISCONTINUED | OUTPATIENT
Start: 2020-04-18 | End: 2020-04-18 | Stop reason: HOSPADM

## 2020-04-19 NOTE — ED PROVIDER NOTES
Encounter Date: 4/18/2020       History     Chief Complaint   Patient presents with    Shortness of Breath     x 2 days with intermittent mid sternal cp     59-year-old male presents complaining of shortness of breath, patient has a past medical history of coronary artery disease, diabetes, hypertension, myocardial infarction.  Patient has a history of 7 stents, patient reports chest pain yesterday but no pain today patient describes chest pain as sharp and intermittent.  Patient describes today shortness of breath on exertion patient reports that he has difficulty walking more than 10 ft without shortness of breath.        Review of patient's allergies indicates:  No Known Allergies  Past Medical History:   Diagnosis Date    Coronary artery disease     Diabetes mellitus 07/1922    pt on metformin in past, states DM resolved so no longer taking    Hypertension     MI (myocardial infarction)      Past Surgical History:   Procedure Laterality Date    cardiac stents      x 7    SHOULDER SURGERY Right 1975     No family history on file.  Social History     Tobacco Use    Smoking status: Former Smoker    Smokeless tobacco: Never Used   Substance Use Topics    Alcohol use: Yes     Comment: Occasionally    Drug use: Never     Review of Systems   Constitutional: Negative for fever.   HENT: Negative for congestion, rhinorrhea, sore throat and trouble swallowing.    Eyes: Negative for visual disturbance.   Respiratory: Positive for shortness of breath. Negative for cough, chest tightness and wheezing.    Cardiovascular: Positive for chest pain. Negative for palpitations and leg swelling.   Gastrointestinal: Negative for abdominal distention, abdominal pain, constipation, diarrhea, nausea and vomiting.   Genitourinary: Negative for difficulty urinating, dysuria, flank pain and frequency.   Musculoskeletal: Negative for arthralgias, back pain, joint swelling and neck pain.   Skin: Negative for color change and rash.    Neurological: Negative for dizziness, syncope, speech difficulty, weakness, numbness and headaches.   All other systems reviewed and are negative.      Physical Exam     Initial Vitals   BP Pulse Resp Temp SpO2   04/18/20 1859 04/18/20 1846 04/18/20 1846 04/18/20 1846 04/18/20 1846   (!) 145/69 (!) 56 (!) 24 98 °F (36.7 °C) 98 %      MAP       --                Physical Exam    Nursing note and vitals reviewed.  Constitutional: He appears well-developed and well-nourished. He is not diaphoretic. No distress.   HENT:   Head: Normocephalic and atraumatic.   Right Ear: External ear normal.   Left Ear: External ear normal.   Nose: Nose normal.   Mouth/Throat: Oropharynx is clear and moist. No oropharyngeal exudate.   Eyes: Conjunctivae and EOM are normal. Pupils are equal, round, and reactive to light. Right eye exhibits no discharge. Left eye exhibits no discharge. No scleral icterus.   Neck: Normal range of motion. Neck supple. No thyromegaly present. No tracheal deviation present. No JVD present.   Cardiovascular: Normal rate, regular rhythm, normal heart sounds and intact distal pulses. Exam reveals no gallop and no friction rub.    No murmur heard.  Pulmonary/Chest: No stridor. No respiratory distress. He has no wheezes. He has no rhonchi. He has no rales. He exhibits no tenderness.   Coarse breath sounds noted bilaterally   Abdominal: Soft. Bowel sounds are normal. He exhibits no distension and no mass. There is no tenderness. There is no rebound and no guarding.   Musculoskeletal: Normal range of motion. He exhibits no edema or tenderness.   Lymphadenopathy:     He has no cervical adenopathy.   Neurological: He is alert and oriented to person, place, and time. He has normal strength and normal reflexes. He displays normal reflexes. No cranial nerve deficit or sensory deficit.   Skin: Skin is warm and dry. No rash noted. No erythema.         ED Course   Procedures  Labs Reviewed   COMPREHENSIVE METABOLIC PANEL -  Abnormal; Notable for the following components:       Result Value    Glucose 137 (*)     BUN, Bld 22 (*)     ALT 46 (*)     All other components within normal limits   CBC W/ AUTO DIFFERENTIAL   B-TYPE NATRIURETIC PEPTIDE   TROPONIN I   PROTIME-INR   D DIMER, QUANTITATIVE   MAGNESIUM   TSH   SARS-COV-2 RNA AMPLIFICATION, QUAL    Narrative:     What symptom criteria does the patient meet?->Difficulty  breathing     EKG Readings: (Independently Interpreted)   Initial Reading: No STEMI. Rhythm: Normal Sinus Rhythm. Heart Rate: 59. Ectopy: No Ectopy. Conduction: Normal. Axis: Normal.       Imaging Results          X-Ray Chest AP Portable (Final result)  Result time 04/18/20 19:04:12    Final result by Beth Cabrera MD (04/18/20 19:04:12)                 Narrative:    PROCEDURE:   XR CHEST AP PORTABLE  dated  4/18/2020 6:54 PM    CLINICAL HISTORY:   Male 59 years of age.   sob    TECHNIQUE: AP view of the chest obtained portably at 6:54 PM.    PREVIOUS STUDIES:  April 30, 2016    FINDINGS:    Cardiac and mediastinal contours are normal. Lungs are clear. There is  no pleural effusion or pneumothorax. Bones are unremarkable.      IMPRESSION:    No acute findings. Clear lungs. Normal size heart.  Stable compared with the prior study.    Electronically Signed by Beth Cabrera on 4/18/2020 7:31 PM                            X-Rays:   Independently Interpreted Readings:   Chest X-Ray: Normal heart size.  No infiltrates.  No acute abnormalities.     Medical Decision Making:   History:   Old Medical Records: I decided to obtain old medical records.  Initial Assessment:   Emergent evaluation of a 59-year-old male presenting with chest pain and shortness of breath no pain currently, differential diagnosis includes fluid overload, infection, electrolyte abnormality, ACS              Attending Attestation:             Attending ED Notes:   Patient's labs show no significant acute abnormalities patient's chest x-ray is within  normal limits patient's EKG is within normal limits patient's D-dimer is negative patient ambulates without difficulty or desaturation.  Given reported history of chest pain patient was offered observational stay in the hospital for trending of cardiac enzymes but patient declined.  Patient is diagnosed with shortness of breath he is to follow up with Cardiology in the next 2-3 days for re-evaluation he is cautioned to return immediately to the emergency department for any worsening or for any further concerns.                        Clinical Impression:       ICD-10-CM ICD-9-CM   1. SOB (shortness of breath) R06.02 786.05   2. Chest pain R07.9 786.50             ED Disposition Condition    Discharge Stable        ED Prescriptions     None        Follow-up Information     Follow up With Specialties Details Why Contact Info Additional Information    Colin Ambrocio MD Cardiology Schedule an appointment as soon as possible for a visit in 2 days  [address] [address]  [facility]  [facility] [address]  [phone]       [facility] Emergency Medicine  If symptoms worsen [address] [address]  [phone] 1st floor                                     Ady Reynoso MD  04/18/20 1456

## 2020-05-26 ENCOUNTER — TELEPHONE (OUTPATIENT)
Dept: FAMILY MEDICINE | Facility: CLINIC | Age: 60
End: 2020-05-26

## 2020-06-02 ENCOUNTER — OFFICE VISIT (OUTPATIENT)
Dept: FAMILY MEDICINE | Facility: CLINIC | Age: 60
End: 2020-06-02
Payer: COMMERCIAL

## 2020-06-02 DIAGNOSIS — E66.9 OBESITY, UNSPECIFIED CLASSIFICATION, UNSPECIFIED OBESITY TYPE, UNSPECIFIED WHETHER SERIOUS COMORBIDITY PRESENT: ICD-10-CM

## 2020-06-02 DIAGNOSIS — I25.10 CORONARY ARTERY DISEASE, ANGINA PRESENCE UNSPECIFIED, UNSPECIFIED VESSEL OR LESION TYPE, UNSPECIFIED WHETHER NATIVE OR TRANSPLANTED HEART: ICD-10-CM

## 2020-06-02 DIAGNOSIS — E78.5 HYPERLIPIDEMIA, UNSPECIFIED HYPERLIPIDEMIA TYPE: ICD-10-CM

## 2020-06-02 DIAGNOSIS — F32.A DEPRESSIVE DISORDER: Primary | ICD-10-CM

## 2020-06-02 DIAGNOSIS — I10 HYPERTENSION, UNSPECIFIED TYPE: ICD-10-CM

## 2020-06-02 DIAGNOSIS — N52.9 ERECTILE DYSFUNCTION, UNSPECIFIED ERECTILE DYSFUNCTION TYPE: ICD-10-CM

## 2020-06-02 PROCEDURE — 99214 OFFICE O/P EST MOD 30 MIN: CPT | Mod: 95,,, | Performed by: NURSE PRACTITIONER

## 2020-06-02 PROCEDURE — 99214 PR OFFICE/OUTPT VISIT, EST, LEVL IV, 30-39 MIN: ICD-10-PCS | Mod: 95,,, | Performed by: NURSE PRACTITIONER

## 2020-06-02 RX ORDER — SILDENAFIL 50 MG/1
100 TABLET, FILM COATED ORAL DAILY PRN
Qty: 30 TABLET | Refills: 2 | Status: SHIPPED | OUTPATIENT
Start: 2020-06-02 | End: 2021-10-12 | Stop reason: SDUPTHER

## 2020-06-02 RX ORDER — BUPROPION HYDROCHLORIDE 150 MG/1
150 TABLET ORAL 2 TIMES DAILY
Qty: 180 TABLET | Refills: 2 | Status: SHIPPED | OUTPATIENT
Start: 2020-06-02 | End: 2020-11-09 | Stop reason: SDUPTHER

## 2020-06-02 NOTE — PROGRESS NOTES
SUBJECTIVE:    Patient ID: Dayday Rodriguez is a 59 y.o. male.    Chief Complaint: No chief complaint on file.    59 year old male presents for virtual visit. Overall doing well. Does not check blood pressure regularly. Moods have been good. Happy with medication. Sleeps ok most nights. Does have occasional nights that does not sleep well. Was not able to afford viagra would like to discuss.       Admission on 04/18/2020, Discharged on 04/18/2020   Component Date Value Ref Range Status    WBC 04/18/2020 8.86  3.90 - 12.70 K/uL Final    RBC 04/18/2020 5.37  4.60 - 6.20 M/uL Final    Hemoglobin 04/18/2020 15.7  14.0 - 18.0 g/dL Final    Hematocrit 04/18/2020 46.7  40.0 - 54.0 % Final    Mean Corpuscular Volume 04/18/2020 87  82 - 98 fL Final    Mean Corpuscular Hemoglobin 04/18/2020 29.2  27.0 - 31.0 pg Final    Mean Corpuscular Hemoglobin Conc 04/18/2020 33.6  32.0 - 36.0 g/dL Final    RDW 04/18/2020 13.2  11.5 - 14.5 % Final    Platelets 04/18/2020 187  150 - 350 K/uL Final    MPV 04/18/2020 10.4  9.2 - 12.9 fL Final    Immature Granulocytes 04/18/2020 0.2  0.0 - 0.5 % Final    Gran # (ANC) 04/18/2020 5.5  1.8 - 7.7 K/uL Final    Immature Grans (Abs) 04/18/2020 0.02  0.00 - 0.04 K/uL Final    Lymph # 04/18/2020 2.3  1.0 - 4.8 K/uL Final    Mono # 04/18/2020 1.0  0.3 - 1.0 K/uL Final    Eos # 04/18/2020 0.1  0.0 - 0.5 K/uL Final    Baso # 04/18/2020 0.03  0.00 - 0.20 K/uL Final    nRBC 04/18/2020 0  0 /100 WBC Final    Gran% 04/18/2020 61.7  38.0 - 73.0 % Final    Lymph% 04/18/2020 25.7  18.0 - 48.0 % Final    Mono% 04/18/2020 11.1  4.0 - 15.0 % Final    Eosinophil% 04/18/2020 1.0  0.0 - 8.0 % Final    Basophil% 04/18/2020 0.3  0.0 - 1.9 % Final    Differential Method 04/18/2020 Automated   Final    Sodium 04/18/2020 139  136 - 145 mmol/L Final    Potassium 04/18/2020 3.9  3.5 - 5.1 mmol/L Final    Chloride 04/18/2020 101  95 - 110 mmol/L Final    CO2 04/18/2020 27  23 - 29 mmol/L Final     Glucose 04/18/2020 137* 70 - 110 mg/dL Final    BUN, Bld 04/18/2020 22* 6 - 20 mg/dL Final    Creatinine 04/18/2020 1.2  0.5 - 1.4 mg/dL Final    Calcium 04/18/2020 9.7  8.7 - 10.5 mg/dL Final    Total Protein 04/18/2020 7.1  6.0 - 8.4 g/dL Final    Albumin 04/18/2020 3.9  3.5 - 5.2 g/dL Final    Total Bilirubin 04/18/2020 1.0  0.1 - 1.0 mg/dL Final    Alkaline Phosphatase 04/18/2020 104  55 - 135 U/L Final    AST 04/18/2020 31  10 - 40 U/L Final    ALT 04/18/2020 46* 10 - 44 U/L Final    Anion Gap 04/18/2020 11  8 - 16 mmol/L Final    eGFR if African American 04/18/2020 >60.0  >60 mL/min/1.73 m^2 Final    eGFR if non African American 04/18/2020 >60.0  >60 mL/min/1.73 m^2 Final    BNP 04/18/2020 25  0 - 99 pg/mL Final    Troponin I 04/18/2020 <0.030  <=0.040 ng/mL Final    PT 04/18/2020 14.0  10.6 - 14.8 sec Final    INR 04/18/2020 1.1   Final    D-Dimer 04/18/2020 <0.27  <0.50 ug/mL FEU Final    Magnesium 04/18/2020 1.8  1.6 - 2.6 mg/dL Final    TSH 04/18/2020 5.010  0.340 - 5.600 uIU/mL Final    SARS-CoV-2 RNA, Amplification, Qual 04/18/2020 Negative  Negative Final   Office Visit on 03/02/2020   Component Date Value Ref Range Status    Testosterone 03/02/2020 490  250 - 1,100 ng/dL Final    Cholesterol 03/02/2020 192  <200 mg/dL Final    HDL 03/02/2020 47  > OR = 40 mg/dL Final    Triglycerides 03/02/2020 74  <150 mg/dL Final    LDL Cholesterol 03/02/2020 128* mg/dL (calc) Final    Hdl/Cholesterol Ratio 03/02/2020 4.1  <5.0 (calc) Final    Non HDL Chol. (LDL+VLDL) 03/02/2020 145* <130 mg/dL (calc) Final    Testosterone, Free 03/02/2020 52.0  46.0 - 224.0 pg/mL Final    Glucose 03/02/2020 129* 65 - 99 mg/dL Final    BUN, Bld 03/02/2020 19  7 - 25 mg/dL Final    Creatinine 03/02/2020 1.15  0.70 - 1.33 mg/dL Final    eGFR if non African American 03/02/2020 69  > OR = 60 mL/min/1.73m2 Final    eGFR if African American 03/02/2020 80  > OR = 60 mL/min/1.73m2 Final    BUN/Creatinine  Ratio 03/02/2020 NOT APPLICABLE  6 - 22 (calc) Final    Sodium 03/02/2020 140  135 - 146 mmol/L Final    Potassium 03/02/2020 4.4  3.5 - 5.3 mmol/L Final    Chloride 03/02/2020 100  98 - 110 mmol/L Final    CO2 03/02/2020 33* 20 - 32 mmol/L Final    Calcium 03/02/2020 9.9  8.6 - 10.3 mg/dL Final    Total Protein 03/02/2020 6.8  6.1 - 8.1 g/dL Final    Albumin 03/02/2020 4.3  3.6 - 5.1 g/dL Final    Globulin, Total 03/02/2020 2.5  1.9 - 3.7 g/dL (calc) Final    Albumin/Globulin Ratio 03/02/2020 1.7  1.0 - 2.5 (calc) Final    Total Bilirubin 03/02/2020 0.5  0.2 - 1.2 mg/dL Final    Alkaline Phosphatase 03/02/2020 118  35 - 144 U/L Final    AST 03/02/2020 27  10 - 35 U/L Final    ALT 03/02/2020 43  9 - 46 U/L Final   Office Visit on 01/06/2020   Component Date Value Ref Range Status    WBC 01/06/2020 7.1  3.8 - 10.8 Thousand/uL Final    RBC 01/06/2020 5.60  4.20 - 5.80 Million/uL Final    Hemoglobin 01/06/2020 16.7  13.2 - 17.1 g/dL Final    Hematocrit 01/06/2020 48.3  38.5 - 50.0 % Final    Mean Corpuscular Volume 01/06/2020 86.3  80.0 - 100.0 fL Final    Mean Corpuscular Hemoglobin 01/06/2020 29.8  27.0 - 33.0 pg Final    Mean Corpuscular Hemoglobin Conc 01/06/2020 34.6  32.0 - 36.0 g/dL Final    RDW 01/06/2020 13.3  11.0 - 15.0 % Final    Platelets 01/06/2020 217  140 - 400 Thousand/uL Final    MPV 01/06/2020 10.9  7.5 - 12.5 fL Final    Neutrophils Absolute 01/06/2020 4,651  1,500 - 7,800 cells/uL Final    Lymph # 01/06/2020 1,754  850 - 3,900 cells/uL Final    Mono # 01/06/2020 625  200 - 950 cells/uL Final    Eos # 01/06/2020 43  15 - 500 cells/uL Final    Baso # 01/06/2020 28  0 - 200 cells/uL Final    Neutrophils Relative 01/06/2020 65.5  % Final    Lymph% 01/06/2020 24.7  % Final    Mono% 01/06/2020 8.8  % Final    Eosinophil% 01/06/2020 0.6  % Final    Basophil% 01/06/2020 0.4  % Final    Glucose 01/06/2020 100* 65 - 99 mg/dL Final    BUN, Bld 01/06/2020 15  7 - 25 mg/dL  Final    Creatinine 01/06/2020 0.94  0.70 - 1.33 mg/dL Final    eGFR if non African American 01/06/2020 88  > OR = 60 mL/min/1.73m2 Final    eGFR if African American 01/06/2020 102  > OR = 60 mL/min/1.73m2 Final    BUN/Creatinine Ratio 01/06/2020 NOT APPLICABLE  6 - 22 (calc) Final    Sodium 01/06/2020 138  135 - 146 mmol/L Final    Potassium 01/06/2020 4.5  3.5 - 5.3 mmol/L Final    Chloride 01/06/2020 102  98 - 110 mmol/L Final    CO2 01/06/2020 26  20 - 32 mmol/L Final    Calcium 01/06/2020 9.2  8.6 - 10.3 mg/dL Final    Total Protein 01/06/2020 6.8  6.1 - 8.1 g/dL Final    Albumin 01/06/2020 4.2  3.6 - 5.1 g/dL Final    Globulin, Total 01/06/2020 2.6  1.9 - 3.7 g/dL (calc) Final    Albumin/Globulin Ratio 01/06/2020 1.6  1.0 - 2.5 (calc) Final    Total Bilirubin 01/06/2020 0.5  0.2 - 1.2 mg/dL Final    Alkaline Phosphatase 01/06/2020 123* 40 - 115 U/L Final    AST 01/06/2020 25  10 - 35 U/L Final    ALT 01/06/2020 45  9 - 46 U/L Final    Cholesterol 01/06/2020 228* <200 mg/dL Final    HDL 01/06/2020 46  >40 mg/dL Final    Triglycerides 01/06/2020 98  <150 mg/dL Final    LDL Cholesterol 01/06/2020 161* mg/dL (calc) Final    Hdl/Cholesterol Ratio 01/06/2020 5.0* <5.0 (calc) Final    Non HDL Chol. (LDL+VLDL) 01/06/2020 182* <130 mg/dL (calc) Final    Color, UA 01/06/2020 YELLOW  YELLOW Final    Appearance, UA 01/06/2020 CLEAR  CLEAR Final    Specific [place], UA 01/06/2020 1.024  1.001 - 1.035 Final    pH, UA 01/06/2020 7.0  5.0 - 8.0 Final    Glucose, UA 01/06/2020 NEGATIVE  NEGATIVE Final    Bilirubin, UA 01/06/2020 NEGATIVE  NEGATIVE Final    Ketones, UA 01/06/2020 NEGATIVE  NEGATIVE Final    Occult Blood UA 01/06/2020 NEGATIVE  NEGATIVE Final    Protein, UA 01/06/2020 NEGATIVE  NEGATIVE Final    Nitrite, UA 01/06/2020 NEGATIVE  NEGATIVE Final    Leukocytes, UA 01/06/2020 NEGATIVE  NEGATIVE Final    WBC Casts, UA 01/06/2020 NONE SEEN  < OR = 5 /HPF Final    RBC Casts, UA  01/06/2020 0-2  < OR = 2 /HPF Final    Squam Epithel, UA 01/06/2020 NONE SEEN  < OR = 5 /HPF Final    Bacteria, UA 01/06/2020 NONE SEEN  NONE SEEN /HPF Final    Hyaline Casts, UA 01/06/2020 NONE SEEN  NONE SEEN /LPF Final    Reflexive Urine Culture 01/06/2020 NO CULTURE INDICATED   Final    PROSTATE SPECIFIC ANTIGEN, SCR - Q* 01/06/2020 1.5  < OR = 4.0 ng/mL Final    TSH w/reflex to FT4 01/06/2020 2.76  0.40 - 4.50 mIU/L Final    Creatinine, Random Ur 01/06/2020 172  20 - 320 mg/dL Final    Microalb, Ur 01/06/2020 0.4  See Note: mg/dL Final    Microalb Creat Ratio 01/06/2020 2  <30 mcg/mg creat Final       Past Medical History:   Diagnosis Date    Coronary artery disease     Diabetes mellitus 07/1922    pt on metformin in past, states DM resolved so no longer taking    Hypertension     MI (myocardial infarction)      Past Surgical History:   Procedure Laterality Date    cardiac stents      x 7    SHOULDER SURGERY Right 1975     History reviewed. No pertinent family history.    Marital Status:   Alcohol History:  reports that he drinks alcohol.  Tobacco History:  reports that he has quit smoking. He has never used smokeless tobacco.  Drug History:  reports that he does not use drugs.    Review of patient's allergies indicates:  No Known Allergies    Current Outpatient Medications:     aspirin (ECOTRIN) 81 MG EC tablet, Aspir-81 mg tablet,delayed release  Take 1 tablet every day by oral route., Disp: , Rfl:     buPROPion (WELLBUTRIN XL) 150 MG TB24 tablet, Take 1 tablet (150 mg total) by mouth 2 (two) times daily., Disp: 180 tablet, Rfl: 2    hydroCHLOROthiazide (HYDRODIURIL) 12.5 MG Tab, Take 1 tablet (12.5 mg total) by mouth once daily., Disp: 90 tablet, Rfl: 3    losartan (COZAAR) 50 MG tablet, Take 1 tablet (50 mg total) by mouth once daily., Disp: 90 tablet, Rfl: 3    metoprolol succinate (TOPROL-XL) 25 MG 24 hr tablet, Take 1 tablet (25 mg total) by mouth nightly., Disp: 90 tablet,  Rfl: 2    rosuvastatin (CRESTOR) 40 MG Tab, Take 1 tablet (40 mg total) by mouth every evening., Disp: 90 tablet, Rfl: 3    losartan-hydrochlorothiazide 50-12.5 mg (HYZAAR) 50-12.5 mg per tablet, Take 0.05 tablets by mouth once daily., Disp: , Rfl:     sildenafiL (VIAGRA) 50 MG tablet, Take 2 tablets (100 mg total) by mouth daily as needed for Erectile Dysfunction., Disp: 30 tablet, Rfl: 2    Review of Systems   Constitutional: Negative for activity change, fatigue, fever and unexpected weight change.   HENT: Negative for ear pain, hearing loss, rhinorrhea, sinus pressure, sore throat and trouble swallowing.    Eyes: Negative for pain, discharge and visual disturbance.   Respiratory: Negative for cough, chest tightness, shortness of breath and wheezing.    Cardiovascular: Negative for chest pain, palpitations and leg swelling.   Gastrointestinal: Negative for abdominal pain, blood in stool, constipation, diarrhea, nausea and vomiting.   Endocrine: Negative for polydipsia and polyuria.   Genitourinary: Negative for difficulty urinating and dysuria.   Musculoskeletal: Negative for arthralgias, joint swelling and neck pain.   Skin: Negative for rash.   Neurological: Negative for dizziness, weakness and headaches.   Psychiatric/Behavioral: Negative for confusion, dysphoric mood and sleep disturbance.          Objective:      There were no vitals filed for this visit.  There is no height or weight on file to calculate BMI.  Physical Exam   Constitutional: He appears well-developed and well-nourished. No distress.         Assessment:       1. Depressive disorder    2. Coronary artery disease, angina presence unspecified, unspecified vessel or lesion type, unspecified whether native or transplanted heart    3. Hypertension, unspecified type    4. Obesity, unspecified classification, unspecified obesity type, unspecified whether serious comorbidity present    5. Hyperlipidemia, unspecified hyperlipidemia type    6.  Erectile dysfunction, unspecified erectile dysfunction type         Plan:       Depressive disorder  -     buPROPion (WELLBUTRIN XL) 150 MG TB24 tablet; Take 1 tablet (150 mg total) by mouth 2 (two) times daily.  Dispense: 180 tablet; Refill: 2    Coronary artery disease, angina presence unspecified, unspecified vessel or lesion type, unspecified whether native or transplanted heart    Hypertension, unspecified type    Obesity, unspecified classification, unspecified obesity type, unspecified whether serious comorbidity present    Hyperlipidemia, unspecified hyperlipidemia type    Erectile dysfunction, unspecified erectile dysfunction type    Other orders  -     sildenafiL (VIAGRA) 50 MG tablet; Take 2 tablets (100 mg total) by mouth daily as needed for Erectile Dysfunction.  Dispense: 30 tablet; Refill: 2      Follow up in about 6 months (around 12/2/2020) for medication management.

## 2020-10-30 ENCOUNTER — PATIENT MESSAGE (OUTPATIENT)
Dept: FAMILY MEDICINE | Facility: CLINIC | Age: 60
End: 2020-10-30

## 2020-11-02 NOTE — TELEPHONE ENCOUNTER
According to [facility] he can get [facility] from [facility] for 13.34. see if this will work. May need to be walked through [facility].

## 2020-11-09 ENCOUNTER — TELEPHONE (OUTPATIENT)
Dept: FAMILY MEDICINE | Facility: CLINIC | Age: 60
End: 2020-11-09

## 2020-11-09 DIAGNOSIS — F32.A DEPRESSIVE DISORDER: ICD-10-CM

## 2020-11-09 RX ORDER — BUPROPION HYDROCHLORIDE 150 MG/1
150 TABLET ORAL 2 TIMES DAILY
Qty: 180 TABLET | Refills: 2 | Status: SHIPPED | OUTPATIENT
Start: 2020-11-09 | End: 2020-11-09

## 2020-11-09 RX ORDER — BUPROPION HYDROCHLORIDE 150 MG/1
150 TABLET ORAL 2 TIMES DAILY
Qty: 180 TABLET | Refills: 2 | Status: SHIPPED | OUTPATIENT
Start: 2020-11-09 | End: 2021-09-16 | Stop reason: SDUPTHER

## 2020-12-10 ENCOUNTER — TELEPHONE (OUTPATIENT)
Dept: FAMILY MEDICINE | Facility: CLINIC | Age: 60
End: 2020-12-10

## 2021-01-20 ENCOUNTER — PATIENT MESSAGE (OUTPATIENT)
Dept: FAMILY MEDICINE | Facility: CLINIC | Age: 61
End: 2021-01-20

## 2021-01-20 DIAGNOSIS — I10 HYPERTENSION, UNSPECIFIED TYPE: Primary | ICD-10-CM

## 2021-01-20 RX ORDER — LOSARTAN POTASSIUM AND HYDROCHLOROTHIAZIDE 12.5; 5 MG/1; MG/1
1 TABLET ORAL DAILY
Qty: 180 TABLET | Refills: 1 | Status: SHIPPED | OUTPATIENT
Start: 2021-01-20 | End: 2021-10-12 | Stop reason: SDUPTHER

## 2021-05-04 ENCOUNTER — PATIENT MESSAGE (OUTPATIENT)
Dept: FAMILY MEDICINE | Facility: CLINIC | Age: 61
End: 2021-05-04

## 2021-05-04 DIAGNOSIS — E78.5 HYPERLIPIDEMIA, UNSPECIFIED HYPERLIPIDEMIA TYPE: ICD-10-CM

## 2021-05-04 RX ORDER — ROSUVASTATIN CALCIUM 40 MG/1
40 TABLET, COATED ORAL NIGHTLY
Qty: 90 TABLET | Refills: 3 | Status: SHIPPED | OUTPATIENT
Start: 2021-05-04 | End: 2021-10-12 | Stop reason: SDUPTHER

## 2021-05-04 RX ORDER — METOPROLOL SUCCINATE 25 MG/1
25 TABLET, EXTENDED RELEASE ORAL NIGHTLY
Qty: 90 TABLET | Refills: 2 | Status: SHIPPED | OUTPATIENT
Start: 2021-05-04 | End: 2021-10-12 | Stop reason: SDUPTHER

## 2021-05-12 ENCOUNTER — PATIENT MESSAGE (OUTPATIENT)
Dept: RESEARCH | Facility: HOSPITAL | Age: 61
End: 2021-05-12

## 2021-09-14 ENCOUNTER — PATIENT MESSAGE (OUTPATIENT)
Dept: FAMILY MEDICINE | Facility: CLINIC | Age: 61
End: 2021-09-14

## 2021-09-16 ENCOUNTER — PATIENT MESSAGE (OUTPATIENT)
Dept: FAMILY MEDICINE | Facility: CLINIC | Age: 61
End: 2021-09-16

## 2021-09-16 DIAGNOSIS — F32.A DEPRESSIVE DISORDER: ICD-10-CM

## 2021-09-16 RX ORDER — BUPROPION HYDROCHLORIDE 150 MG/1
150 TABLET ORAL 2 TIMES DAILY
Qty: 60 TABLET | Refills: 0 | Status: SHIPPED | OUTPATIENT
Start: 2021-09-16 | End: 2021-10-12 | Stop reason: SDUPTHER

## 2021-10-12 ENCOUNTER — HOSPITAL ENCOUNTER (OUTPATIENT)
Dept: RADIOLOGY | Facility: HOSPITAL | Age: 61
Discharge: HOME | End: 2021-10-12
Attending: NURSE PRACTITIONER
Payer: COMMERCIAL

## 2021-10-12 ENCOUNTER — OFFICE VISIT (OUTPATIENT)
Dept: FAMILY MEDICINE | Facility: CLINIC | Age: 61
End: 2021-10-12
Payer: COMMERCIAL

## 2021-10-12 DIAGNOSIS — M54.12 CERVICAL RADICULOPATHY: ICD-10-CM

## 2021-10-12 DIAGNOSIS — E78.5 HYPERLIPIDEMIA, UNSPECIFIED HYPERLIPIDEMIA TYPE: ICD-10-CM

## 2021-10-12 DIAGNOSIS — F32.A DEPRESSIVE DISORDER: ICD-10-CM

## 2021-10-12 DIAGNOSIS — I10 HYPERTENSION, UNSPECIFIED TYPE: ICD-10-CM

## 2021-10-12 DIAGNOSIS — E66.9 OBESITY, UNSPECIFIED CLASSIFICATION, UNSPECIFIED OBESITY TYPE, UNSPECIFIED WHETHER SERIOUS COMORBIDITY PRESENT: ICD-10-CM

## 2021-10-12 DIAGNOSIS — I25.10 CORONARY ARTERY DISEASE, UNSPECIFIED VESSEL OR LESION TYPE, UNSPECIFIED WHETHER ANGINA PRESENT, UNSPECIFIED WHETHER NATIVE OR TRANSPLANTED HEART: Primary | ICD-10-CM

## 2021-10-12 DIAGNOSIS — N52.9 ERECTILE DYSFUNCTION, UNSPECIFIED ERECTILE DYSFUNCTION TYPE: ICD-10-CM

## 2021-10-12 PROCEDURE — 3077F PR MOST RECENT SYSTOLIC BLOOD PRESSURE >= 140 MM HG: ICD-10-PCS | Mod: S$GLB,,, | Performed by: NURSE PRACTITIONER

## 2021-10-12 PROCEDURE — 3008F BODY MASS INDEX DOCD: CPT | Mod: S$GLB,,, | Performed by: NURSE PRACTITIONER

## 2021-10-12 PROCEDURE — 99214 OFFICE O/P EST MOD 30 MIN: CPT | Mod: S$GLB,,, | Performed by: NURSE PRACTITIONER

## 2021-10-12 PROCEDURE — 99214 PR OFFICE/OUTPT VISIT, EST, LEVL IV, 30-39 MIN: ICD-10-PCS | Mod: S$GLB,,, | Performed by: NURSE PRACTITIONER

## 2021-10-12 PROCEDURE — 1160F RVW MEDS BY RX/DR IN RCRD: CPT | Mod: S$GLB,,, | Performed by: NURSE PRACTITIONER

## 2021-10-12 PROCEDURE — 1160F PR REVIEW ALL MEDS BY PRESCRIBER/CLIN PHARMACIST DOCUMENTED: ICD-10-PCS | Mod: S$GLB,,, | Performed by: NURSE PRACTITIONER

## 2021-10-12 PROCEDURE — 3077F SYST BP >= 140 MM HG: CPT | Mod: S$GLB,,, | Performed by: NURSE PRACTITIONER

## 2021-10-12 PROCEDURE — 72040 X-RAY EXAM NECK SPINE 2-3 VW: CPT | Mod: TC,PO

## 2021-10-12 PROCEDURE — 3079F DIAST BP 80-89 MM HG: CPT | Mod: S$GLB,,, | Performed by: NURSE PRACTITIONER

## 2021-10-12 PROCEDURE — 3079F PR MOST RECENT DIASTOLIC BLOOD PRESSURE 80-89 MM HG: ICD-10-PCS | Mod: S$GLB,,, | Performed by: NURSE PRACTITIONER

## 2021-10-12 PROCEDURE — 3008F PR BODY MASS INDEX (BMI) DOCUMENTED: ICD-10-PCS | Mod: S$GLB,,, | Performed by: NURSE PRACTITIONER

## 2021-10-12 RX ORDER — HYDROCHLOROTHIAZIDE 12.5 MG/1
12.5 TABLET ORAL DAILY
Qty: 90 TABLET | Refills: 1 | Status: CANCELLED | OUTPATIENT
Start: 2021-10-12

## 2021-10-12 RX ORDER — LOSARTAN POTASSIUM 50 MG/1
50 TABLET ORAL DAILY
Qty: 90 TABLET | Refills: 1 | Status: CANCELLED | OUTPATIENT
Start: 2021-10-12

## 2021-10-12 RX ORDER — SILDENAFIL 100 MG/1
100 TABLET, FILM COATED ORAL DAILY PRN
Qty: 30 TABLET | Refills: 2 | Status: SHIPPED | OUTPATIENT
Start: 2021-10-12 | End: 2024-01-09

## 2021-10-12 RX ORDER — METHYLPREDNISOLONE 4 MG/1
TABLET ORAL
Qty: 1 PACKAGE | Refills: 0 | Status: SHIPPED | OUTPATIENT
Start: 2021-10-12 | End: 2021-11-02

## 2021-10-12 RX ORDER — TIZANIDINE 4 MG/1
4 TABLET ORAL EVERY 8 HOURS
Qty: 30 TABLET | Refills: 0 | Status: SHIPPED | OUTPATIENT
Start: 2021-10-12 | End: 2021-10-22

## 2021-10-12 RX ORDER — METOPROLOL SUCCINATE 25 MG/1
25 TABLET, EXTENDED RELEASE ORAL NIGHTLY
Qty: 90 TABLET | Refills: 1 | Status: SHIPPED | OUTPATIENT
Start: 2021-10-12 | End: 2022-04-11 | Stop reason: SDUPTHER

## 2021-10-12 RX ORDER — ROSUVASTATIN CALCIUM 40 MG/1
40 TABLET, COATED ORAL NIGHTLY
Qty: 90 TABLET | Refills: 1 | Status: SHIPPED | OUTPATIENT
Start: 2021-10-12 | End: 2022-04-11 | Stop reason: SDUPTHER

## 2021-10-12 RX ORDER — LOSARTAN POTASSIUM AND HYDROCHLOROTHIAZIDE 12.5; 5 MG/1; MG/1
1 TABLET ORAL DAILY
Qty: 180 TABLET | Refills: 1 | Status: SHIPPED | OUTPATIENT
Start: 2021-10-12 | End: 2022-04-11 | Stop reason: SDUPTHER

## 2021-10-12 RX ORDER — BUPROPION HYDROCHLORIDE 150 MG/1
150 TABLET ORAL 2 TIMES DAILY
Qty: 180 TABLET | Refills: 1 | Status: SHIPPED | OUTPATIENT
Start: 2021-10-12 | End: 2022-04-11 | Stop reason: SDUPTHER

## 2021-10-13 ENCOUNTER — TELEPHONE (OUTPATIENT)
Dept: FAMILY MEDICINE | Facility: CLINIC | Age: 61
End: 2021-10-13

## 2021-10-18 VITALS
DIASTOLIC BLOOD PRESSURE: 88 MMHG | SYSTOLIC BLOOD PRESSURE: 140 MMHG | HEART RATE: 70 BPM | OXYGEN SATURATION: 95 % | BODY MASS INDEX: 38.36 KG/M2 | WEIGHT: 315 LBS | HEIGHT: 76 IN

## 2022-01-04 ENCOUNTER — TELEPHONE (OUTPATIENT)
Dept: FAMILY MEDICINE | Facility: CLINIC | Age: 62
End: 2022-01-04
Payer: COMMERCIAL

## 2022-01-04 ENCOUNTER — OFFICE VISIT (OUTPATIENT)
Dept: FAMILY MEDICINE | Facility: CLINIC | Age: 62
End: 2022-01-04
Payer: COMMERCIAL

## 2022-01-04 VITALS
BODY MASS INDEX: 37.75 KG/M2 | HEIGHT: 76 IN | DIASTOLIC BLOOD PRESSURE: 84 MMHG | WEIGHT: 310 LBS | OXYGEN SATURATION: 96 % | SYSTOLIC BLOOD PRESSURE: 138 MMHG | HEART RATE: 81 BPM

## 2022-01-04 DIAGNOSIS — E66.9 OBESITY, UNSPECIFIED CLASSIFICATION, UNSPECIFIED OBESITY TYPE, UNSPECIFIED WHETHER SERIOUS COMORBIDITY PRESENT: ICD-10-CM

## 2022-01-04 DIAGNOSIS — M54.12 CERVICAL RADICULOPATHY: ICD-10-CM

## 2022-01-04 DIAGNOSIS — R68.89 FLU-LIKE SYMPTOMS: Primary | ICD-10-CM

## 2022-01-04 DIAGNOSIS — I10 HYPERTENSION, UNSPECIFIED TYPE: ICD-10-CM

## 2022-01-04 DIAGNOSIS — U07.1 COVID-19: ICD-10-CM

## 2022-01-04 DIAGNOSIS — Z79.899 HIGH RISK MEDICATION USE: ICD-10-CM

## 2022-01-04 DIAGNOSIS — I25.10 CORONARY ARTERY DISEASE, UNSPECIFIED VESSEL OR LESION TYPE, UNSPECIFIED WHETHER ANGINA PRESENT, UNSPECIFIED WHETHER NATIVE OR TRANSPLANTED HEART: ICD-10-CM

## 2022-01-04 LAB
CTP QC/QA: YES
SARS-COV-2 RDRP RESP QL NAA+PROBE: POSITIVE

## 2022-01-04 PROCEDURE — 3079F PR MOST RECENT DIASTOLIC BLOOD PRESSURE 80-89 MM HG: ICD-10-PCS | Mod: S$GLB,,, | Performed by: NURSE PRACTITIONER

## 2022-01-04 PROCEDURE — 3008F BODY MASS INDEX DOCD: CPT | Mod: S$GLB,,, | Performed by: NURSE PRACTITIONER

## 2022-01-04 PROCEDURE — 3079F DIAST BP 80-89 MM HG: CPT | Mod: S$GLB,,, | Performed by: NURSE PRACTITIONER

## 2022-01-04 PROCEDURE — 3008F PR BODY MASS INDEX (BMI) DOCUMENTED: ICD-10-PCS | Mod: S$GLB,,, | Performed by: NURSE PRACTITIONER

## 2022-01-04 PROCEDURE — U0002 COVID-19 LAB TEST NON-CDC: HCPCS | Mod: QW,S$GLB,, | Performed by: NURSE PRACTITIONER

## 2022-01-04 PROCEDURE — 3075F SYST BP GE 130 - 139MM HG: CPT | Mod: S$GLB,,, | Performed by: NURSE PRACTITIONER

## 2022-01-04 PROCEDURE — U0002: ICD-10-PCS | Mod: QW,S$GLB,, | Performed by: NURSE PRACTITIONER

## 2022-01-04 PROCEDURE — 99214 PR OFFICE/OUTPT VISIT, EST, LEVL IV, 30-39 MIN: ICD-10-PCS | Mod: S$GLB,,, | Performed by: NURSE PRACTITIONER

## 2022-01-04 PROCEDURE — 3075F PR MOST RECENT SYSTOLIC BLOOD PRESS GE 130-139MM HG: ICD-10-PCS | Mod: S$GLB,,, | Performed by: NURSE PRACTITIONER

## 2022-01-04 PROCEDURE — 99214 OFFICE O/P EST MOD 30 MIN: CPT | Mod: S$GLB,,, | Performed by: NURSE PRACTITIONER

## 2022-01-04 NOTE — TELEPHONE ENCOUNTER
----- Message from Martha Sage sent at 1/4/2022  9:19 AM CST -----  Patient called and stated that he has a headache for about a week and he need to be tested for Covid so he can go back to work please give him a call at [phone]

## 2022-01-04 NOTE — LETTER
[address] [facility] [address]  [place] [address]  Phone: [phone]  Fax: [phone]          Return to Work/School    Patient: Dayday Rodriguez  YOB: 1960   Date: 01/04/2022     To Whom It May Concern:     Dayday Rodriguez was in contact with/seen in my office on 01/04/2022. COVID-19 is present in our communities across the state. There is limited testing for COVID at this time, so not all patients can be tested. In this situation, your employee meets the following criteria:     Dayday Rodriguez has met the criteria for COVID-19 testing and has a POSITIVE result. He can return to work once they are asymptomatic for 24 hours without the use of fever reducing medications AND at least five days from the start of symptoms (or from the first positive result if they have no symptoms).      If you have any questions or concerns, or if I can be of further assistance, please do not hesitate to contact me.     Sincerely,    Melissa Thurston NP

## 2022-01-07 NOTE — PATIENT INSTRUCTIONS
Instructions for Patients with Confirmed or Suspected COVID-19    If you are awaiting your test result, you will either be called or it will be released to the patient portal.  If you have any questions about your test, please visit [email or web address] or call our COVID-19 information line at [phone].      Please isolate yourself at home.  You may leave home and/or return to work once the following conditions are met:    If you have symptoms and tested positive:   More than 5 days since symptoms first appeared AND   More than 24 hours fever free without medications AND       symptoms have improved   · For five days after ending isolation, masks are required.    If you had no symptoms but tested positive:   More than 5 days since the date of the first positive test. If you develop symptoms, then use the guidelines above  · For five days after ending isolation, masks are required.      Testing is not recommended if you are symptom free after completing isolation.

## 2022-01-07 NOTE — PROGRESS NOTES
SUBJECTIVE:    Patient ID: Dayday Rodriguez is a 61 y.o. male.    Chief Complaint: Cough (Coughing, headaches, sinus congestion, fever since Saturday//no med bottles//declined flu vac and colonoscopy//tc)    61 year old male presents for sick visit. Reports symptoms started last Friday. Does have productive cough. Mild sore throat. No fever. No ear pain. Does have post nasal drip. Has not taken anything. Wife with similar symptoms.   Still has numbness and tingling to hands bilaterally. Had xrays last visit which were unremarkable.   .\ Treated for hypertension, depression, cad. Taking medications as prescribed  Does not check blood pressure regularly.       Office Visit on 01/04/2022   Component Date Value Ref Range Status    POC Rapid COVID 01/04/2022 Positive* Negative Final     Acceptable 01/04/2022 Yes   Final       Past Medical History:   Diagnosis Date    Coronary artery disease     Diabetes mellitus 07/1922    pt on metformin in past, states DM resolved so no longer taking    Hypertension     MI (myocardial infarction)      Social History     Socioeconomic History    Marital status:    Tobacco Use    Smoking status: Former Smoker    Smokeless tobacco: Never Used   Substance and Sexual Activity    Alcohol use: Yes     Comment: Occasionally    Drug use: Never    Sexual activity: Yes     Partners: Female     Birth control/protection: Coitus interruptus     Past Surgical History:   Procedure Laterality Date    cardiac stents      x 7    SHOULDER SURGERY Right 1975     No family history on file.    Review of patient's allergies indicates:  No Known Allergies    Current Outpatient Medications:     aspirin (ECOTRIN) 81 MG EC tablet, Aspir-81 mg tablet,delayed release  Take 1 tablet every day by oral route., Disp: , Rfl:     buPROPion (WELLBUTRIN XL) 150 MG TB24 tablet, Take 1 tablet (150 mg total) by mouth 2 (two) times daily., Disp: 180 tablet, Rfl: 1     "losartan-hydrochlorothiazide 50-12.5 mg (HYZAAR) 50-12.5 mg per tablet, Take 1 tablet by mouth once daily., Disp: 180 tablet, Rfl: 1    metoprolol succinate (TOPROL-XL) 25 MG 24 hr tablet, Take 1 tablet (25 mg total) by mouth nightly., Disp: 90 tablet, Rfl: 1    rosuvastatin (CRESTOR) 40 MG Tab, Take 1 tablet (40 mg total) by mouth every evening., Disp: 90 tablet, Rfl: 1    sildenafiL (VIAGRA) 100 MG tablet, Take 1 tablet (100 mg total) by mouth daily as needed for Erectile Dysfunction., Disp: 30 tablet, Rfl: 2    Review of Systems   Constitutional: Negative for chills and fever.   HENT: Positive for congestion and sinus pressure. Negative for ear discharge, ear pain and sore throat.    Eyes: Negative for discharge.   Respiratory: Positive for cough. Negative for shortness of breath.    Cardiovascular: Negative for chest pain and leg swelling.   Musculoskeletal: Positive for neck pain.          Objective:      Vitals:    01/04/22 1310   BP: 138/84   Pulse: 81   SpO2: 96%   Weight: (!) 140.6 kg (310 lb)   Height: 6' 4" (1.93 m)     Physical Exam  Vitals reviewed.   Constitutional:       General: He is not in acute distress.     Appearance: He is well-developed and well-nourished. He is not ill-appearing.   HENT:      Right Ear: External ear normal.      Left Ear: External ear normal.      Nose:      Right Turbinates: Pale.      Left Turbinates: Pale.      Right Sinus: Frontal sinus tenderness present.      Left Sinus: Frontal sinus tenderness present.      Mouth/Throat:      Pharynx: Posterior oropharyngeal erythema present.   Eyes:      Pupils: Pupils are equal, round, and reactive to light.   Neck:      Trachea: No tracheal deviation.   Cardiovascular:      Rate and Rhythm: Normal rate and regular rhythm.      Heart sounds: No murmur heard.  No friction rub. No gallop.    Pulmonary:      Breath sounds: Normal breath sounds. No stridor. No wheezing or rales.   Abdominal:      Palpations: Abdomen is soft. There is " no mass.      Tenderness: There is no abdominal tenderness.   Musculoskeletal:         General: No deformity or edema.      Cervical back: Neck supple. Tenderness present. No spasms. Decreased range of motion.   Lymphadenopathy:      Cervical: No cervical adenopathy.   Skin:     General: Skin is warm and dry.   Neurological:      Mental Status: He is alert and oriented to person, place, and time.      Coordination: Coordination normal.   Psychiatric:         Mood and Affect: Mood and affect normal.         Thought Content: Thought content normal.           Assessment:       1. Flu-like symptoms    2. Cervical radiculopathy    3. Hypertension, unspecified type    4. Obesity, unspecified classification, unspecified obesity type, unspecified whether serious comorbidity present    5. Coronary artery disease, unspecified vessel or lesion type, unspecified whether angina present, unspecified whether native or transplanted heart    6. COVID-19    7. High risk medication use         Plan:       Flu-like symptoms  -     POCT COVID-19 Rapid Screening    Cervical radiculopathy  -     MRI Cervical Spine Without Contrast; Future; Expected date: 01/04/2022    Hypertension, unspecified type    Obesity, unspecified classification, unspecified obesity type, unspecified whether serious comorbidity present    Coronary artery disease, unspecified vessel or lesion type, unspecified whether angina present, unspecified whether native or transplanted heart    COVID-19  Comments:  treat symptoms    High risk medication use      Follow up in about 3 months (around 4/4/2022) for medication management.        1/7/2022 Melissa Thurston

## 2022-01-18 ENCOUNTER — TELEPHONE (OUTPATIENT)
Dept: FAMILY MEDICINE | Facility: CLINIC | Age: 62
End: 2022-01-18
Payer: COMMERCIAL

## 2022-01-18 NOTE — TELEPHONE ENCOUNTER
----- Message from Martha Sage sent at 1/18/2022 12:47 PM CST -----  [facility] called and stated they need to have his order sent to them and not to [facility] please give them a call at [phone]

## 2022-01-25 ENCOUNTER — TELEPHONE (OUTPATIENT)
Dept: FAMILY MEDICINE | Facility: CLINIC | Age: 62
End: 2022-01-25
Payer: COMMERCIAL

## 2022-01-25 NOTE — TELEPHONE ENCOUNTER
----- Message from Rosibel Hollis MA sent at 1/25/2022  2:24 PM CST -----    ----- Message -----  From: Ashely Peacock MA  Sent: 1/25/2022  11:52 AM CST  To: Melissa Thurston Staff    RADIOLOGY DIS

## 2022-01-27 ENCOUNTER — TELEPHONE (OUTPATIENT)
Dept: FAMILY MEDICINE | Facility: CLINIC | Age: 62
End: 2022-01-27
Payer: COMMERCIAL

## 2022-01-27 DIAGNOSIS — M54.12 CERVICAL RADICULOPATHY: Primary | ICD-10-CM

## 2022-01-27 NOTE — TELEPHONE ENCOUNTER
----- Message from Shelbi Cox sent at 1/27/2022  2:32 PM CST -----  Pt calling back for Rosibel said he has some questions

## 2022-04-11 ENCOUNTER — OFFICE VISIT (OUTPATIENT)
Dept: FAMILY MEDICINE | Facility: CLINIC | Age: 62
End: 2022-04-11
Payer: COMMERCIAL

## 2022-04-11 VITALS
HEIGHT: 76 IN | SYSTOLIC BLOOD PRESSURE: 112 MMHG | BODY MASS INDEX: 37.87 KG/M2 | DIASTOLIC BLOOD PRESSURE: 84 MMHG | HEART RATE: 60 BPM | WEIGHT: 311 LBS

## 2022-04-11 DIAGNOSIS — F32.A DEPRESSIVE DISORDER: ICD-10-CM

## 2022-04-11 DIAGNOSIS — G47.30 SLEEP APNEA, UNSPECIFIED TYPE: ICD-10-CM

## 2022-04-11 DIAGNOSIS — Z12.5 SPECIAL SCREENING FOR MALIGNANT NEOPLASM OF PROSTATE: ICD-10-CM

## 2022-04-11 DIAGNOSIS — M54.12 CERVICAL RADICULOPATHY: ICD-10-CM

## 2022-04-11 DIAGNOSIS — Z12.11 SPECIAL SCREENING FOR MALIGNANT NEOPLASMS, COLON: Primary | ICD-10-CM

## 2022-04-11 DIAGNOSIS — I10 HYPERTENSION, UNSPECIFIED TYPE: ICD-10-CM

## 2022-04-11 DIAGNOSIS — E78.5 HYPERLIPIDEMIA, UNSPECIFIED HYPERLIPIDEMIA TYPE: ICD-10-CM

## 2022-04-11 DIAGNOSIS — Z79.899 ENCOUNTER FOR LONG-TERM (CURRENT) USE OF OTHER MEDICATIONS: ICD-10-CM

## 2022-04-11 DIAGNOSIS — E66.9 OBESITY, UNSPECIFIED CLASSIFICATION, UNSPECIFIED OBESITY TYPE, UNSPECIFIED WHETHER SERIOUS COMORBIDITY PRESENT: ICD-10-CM

## 2022-04-11 PROCEDURE — 1160F RVW MEDS BY RX/DR IN RCRD: CPT | Mod: S$GLB,,, | Performed by: NURSE PRACTITIONER

## 2022-04-11 PROCEDURE — 3061F NEG MICROALBUMINURIA REV: CPT | Mod: S$GLB,,, | Performed by: NURSE PRACTITIONER

## 2022-04-11 PROCEDURE — 3074F SYST BP LT 130 MM HG: CPT | Mod: S$GLB,,, | Performed by: NURSE PRACTITIONER

## 2022-04-11 PROCEDURE — 3079F PR MOST RECENT DIASTOLIC BLOOD PRESSURE 80-89 MM HG: ICD-10-PCS | Mod: S$GLB,,, | Performed by: NURSE PRACTITIONER

## 2022-04-11 PROCEDURE — 3008F BODY MASS INDEX DOCD: CPT | Mod: S$GLB,,, | Performed by: NURSE PRACTITIONER

## 2022-04-11 PROCEDURE — 3066F PR DOCUMENTATION OF TREATMENT FOR NEPHROPATHY: ICD-10-PCS | Mod: S$GLB,,, | Performed by: NURSE PRACTITIONER

## 2022-04-11 PROCEDURE — 1160F PR REVIEW ALL MEDS BY PRESCRIBER/CLIN PHARMACIST DOCUMENTED: ICD-10-PCS | Mod: S$GLB,,, | Performed by: NURSE PRACTITIONER

## 2022-04-11 PROCEDURE — 99214 PR OFFICE/OUTPT VISIT, EST, LEVL IV, 30-39 MIN: ICD-10-PCS | Mod: S$GLB,,, | Performed by: NURSE PRACTITIONER

## 2022-04-11 PROCEDURE — 99214 OFFICE O/P EST MOD 30 MIN: CPT | Mod: S$GLB,,, | Performed by: NURSE PRACTITIONER

## 2022-04-11 PROCEDURE — 3074F PR MOST RECENT SYSTOLIC BLOOD PRESSURE < 130 MM HG: ICD-10-PCS | Mod: S$GLB,,, | Performed by: NURSE PRACTITIONER

## 2022-04-11 PROCEDURE — 3061F PR NEG MICROALBUMINURIA RESULT DOCUMENTED/REVIEW: ICD-10-PCS | Mod: S$GLB,,, | Performed by: NURSE PRACTITIONER

## 2022-04-11 PROCEDURE — 3066F NEPHROPATHY DOC TX: CPT | Mod: S$GLB,,, | Performed by: NURSE PRACTITIONER

## 2022-04-11 PROCEDURE — 3008F PR BODY MASS INDEX (BMI) DOCUMENTED: ICD-10-PCS | Mod: S$GLB,,, | Performed by: NURSE PRACTITIONER

## 2022-04-11 PROCEDURE — 3079F DIAST BP 80-89 MM HG: CPT | Mod: S$GLB,,, | Performed by: NURSE PRACTITIONER

## 2022-04-12 LAB
ALBUMIN SERPL-MCNC: 4.5 G/DL (ref 3.8–4.8)
ALBUMIN/CREAT UR: 6 MG/G CREAT (ref 0–29)
ALBUMIN/GLOB SERPL: 1.8 {RATIO} (ref 1.2–2.2)
ALP SERPL-CCNC: 140 IU/L (ref 44–121)
ALT SERPL-CCNC: 40 IU/L (ref 0–44)
APPEARANCE UR: CLEAR
AST SERPL-CCNC: 35 IU/L (ref 0–40)
BACTERIA #/AREA URNS HPF: NORMAL /[HPF]
BASOPHILS # BLD AUTO: 0 X10E3/UL (ref 0–0.2)
BASOPHILS NFR BLD AUTO: 0 %
BILIRUB SERPL-MCNC: 0.5 MG/DL (ref 0–1.2)
BILIRUB UR QL STRIP: NEGATIVE
BUN SERPL-MCNC: 21 MG/DL (ref 8–27)
BUN/CREAT SERPL: 18 (ref 10–24)
CALCIUM SERPL-MCNC: 9.8 MG/DL (ref 8.6–10.2)
CASTS URNS QL MICRO: NORMAL /LPF
CHLORIDE SERPL-SCNC: 97 MMOL/L (ref 96–106)
CHOLEST SERPL-MCNC: 205 MG/DL (ref 100–199)
CO2 SERPL-SCNC: 22 MMOL/L (ref 20–29)
COLOR UR: YELLOW
CREAT SERPL-MCNC: 1.19 MG/DL (ref 0.76–1.27)
CREAT UR-MCNC: 356.9 MG/DL
EOSINOPHIL # BLD AUTO: 0 X10E3/UL (ref 0–0.4)
EOSINOPHIL NFR BLD AUTO: 0 %
EPI CELLS #/AREA URNS HPF: NORMAL /HPF (ref 0–10)
ERYTHROCYTE [DISTWIDTH] IN BLOOD BY AUTOMATED COUNT: 13.5 % (ref 11.6–15.4)
EST. GFR  (NO RACE VARIABLE): 69 ML/MIN/1.73
GLOBULIN SER CALC-MCNC: 2.5 G/DL (ref 1.5–4.5)
GLUCOSE SERPL-MCNC: 136 MG/DL (ref 65–99)
GLUCOSE UR QL STRIP: NEGATIVE
HCT VFR BLD AUTO: 52.4 % (ref 37.5–51)
HDLC SERPL-MCNC: 55 MG/DL
HGB BLD-MCNC: 17.5 G/DL (ref 13–17.7)
HGB UR QL STRIP: NEGATIVE
IMM GRANULOCYTES # BLD AUTO: 0 X10E3/UL (ref 0–0.1)
IMM GRANULOCYTES NFR BLD AUTO: 0 %
KETONES UR QL STRIP: ABNORMAL
LDLC SERPL CALC-MCNC: 138 MG/DL (ref 0–99)
LEUKOCYTE ESTERASE UR QL STRIP: NEGATIVE
LYMPHOCYTES # BLD AUTO: 1.7 X10E3/UL (ref 0.7–3.1)
LYMPHOCYTES NFR BLD AUTO: 21 %
MCH RBC QN AUTO: 29.6 PG (ref 26.6–33)
MCHC RBC AUTO-ENTMCNC: 33.4 G/DL (ref 31.5–35.7)
MCV RBC AUTO: 89 FL (ref 79–97)
MICRO URNS: ABNORMAL
MICRO URNS: ABNORMAL
MICROALBUMIN UR-MCNC: 20.2 UG/ML
MONOCYTES # BLD AUTO: 0.8 X10E3/UL (ref 0.1–0.9)
MONOCYTES NFR BLD AUTO: 10 %
NEUTROPHILS # BLD AUTO: 5.5 X10E3/UL (ref 1.4–7)
NEUTROPHILS NFR BLD AUTO: 69 %
NITRITE UR QL STRIP: NEGATIVE
PH UR STRIP: 5 [PH] (ref 5–7.5)
PLATELET # BLD AUTO: 222 X10E3/UL (ref 150–450)
POTASSIUM SERPL-SCNC: 5.3 MMOL/L (ref 3.5–5.2)
PROT SERPL-MCNC: 7 G/DL (ref 6–8.5)
PROT UR QL STRIP: ABNORMAL
PSA SERPL-MCNC: 1.1 NG/ML (ref 0–4)
RBC # BLD AUTO: 5.91 X10E6/UL (ref 4.14–5.8)
RBC #/AREA URNS HPF: NORMAL /HPF (ref 0–2)
SODIUM SERPL-SCNC: 137 MMOL/L (ref 134–144)
SP GR UR STRIP: >=1.03 (ref 1–1.03)
TRIGL SERPL-MCNC: 67 MG/DL (ref 0–149)
TSH SERPL DL<=0.005 MIU/L-ACNC: 3.9 UIU/ML (ref 0.45–4.5)
URINALYSIS REFLEX: ABNORMAL
UROBILINOGEN UR STRIP-MCNC: 1 MG/DL (ref 0.2–1)
VLDLC SERPL CALC-MCNC: 12 MG/DL (ref 5–40)
WBC # BLD AUTO: 8 X10E3/UL (ref 3.4–10.8)
WBC #/AREA URNS HPF: NORMAL /HPF (ref 0–5)

## 2022-04-13 RX ORDER — LOSARTAN POTASSIUM AND HYDROCHLOROTHIAZIDE 12.5; 5 MG/1; MG/1
1 TABLET ORAL DAILY
Qty: 180 TABLET | Refills: 1 | Status: SHIPPED | OUTPATIENT
Start: 2022-04-13 | End: 2022-10-25 | Stop reason: SDUPTHER

## 2022-04-13 RX ORDER — BUPROPION HYDROCHLORIDE 150 MG/1
150 TABLET ORAL 2 TIMES DAILY
Qty: 180 TABLET | Refills: 1 | Status: SHIPPED | OUTPATIENT
Start: 2022-04-13 | End: 2022-12-06 | Stop reason: SDUPTHER

## 2022-04-13 RX ORDER — ROSUVASTATIN CALCIUM 40 MG/1
40 TABLET, COATED ORAL NIGHTLY
Qty: 90 TABLET | Refills: 1 | Status: SHIPPED | OUTPATIENT
Start: 2022-04-13 | End: 2022-09-20 | Stop reason: SDUPTHER

## 2022-04-13 RX ORDER — METOPROLOL SUCCINATE 25 MG/1
25 TABLET, EXTENDED RELEASE ORAL NIGHTLY
Qty: 90 TABLET | Refills: 1 | Status: SHIPPED | OUTPATIENT
Start: 2022-04-13 | End: 2022-10-25 | Stop reason: SDUPTHER

## 2022-04-14 ENCOUNTER — TELEPHONE (OUTPATIENT)
Dept: FAMILY MEDICINE | Facility: CLINIC | Age: 62
End: 2022-04-14

## 2022-04-14 NOTE — TELEPHONE ENCOUNTER
----- Message from Melissa Thurston NP sent at 4/13/2022 11:05 PM CDT -----  Blood sugar is slightly elevated. Decrease sweets in your diet. Cholesterol has increased some since last lab draw. Start low cholesterol diet. Rest of labs are stable.

## 2022-04-14 NOTE — PROGRESS NOTES
SUBJECTIVE:    Patient ID: Dayday Rodriguez is a 61 y.o. male.    Chief Complaint: Follow-up (Did not bring bottles // Colonoscopy - Refused, agrees to sandra Sosa )    61 year old male presents for check up. Treated for depression, cad, hypertension, hyperlipidemia. Taking meds as prescribed. bp is well controlled in office today. Does not check bp routinely at home. Moods are stable. Does not sleep well. Has been diagnosed with sleep apnea in the past. Does not use cpap. Could not adjust to the mask.   Still has numbness and tingling to hands bilaterally. Had mri. Has not scheduled yet with neurosurgeon.       Office Visit on 04/11/2022   Component Date Value Ref Range Status    WBC 04/11/2022 8.0  3.4 - 10.8 x10E3/uL Final    RBC 04/11/2022 5.91 (A) 4.14 - 5.80 x10E6/uL Final    Hemoglobin 04/11/2022 17.5  13.0 - 17.7 g/dL Final    Hematocrit 04/11/2022 52.4 (A) 37.5 - 51.0 % Final    MCV 04/11/2022 89  79 - 97 fL Final    MCH 04/11/2022 29.6  26.6 - 33.0 pg Final    MCHC 04/11/2022 33.4  31.5 - 35.7 g/dL Final    RDW 04/11/2022 13.5  11.6 - 15.4 % Final    Platelets 04/11/2022 222  150 - 450 x10E3/uL Final    Neutrophils 04/11/2022 69  Not Estab. % Final    Lymphs 04/11/2022 21  Not Estab. % Final    Monocytes 04/11/2022 10  Not Estab. % Final    Eos 04/11/2022 0  Not Estab. % Final    Basos 04/11/2022 0  Not Estab. % Final    Neutrophils (Absolute) 04/11/2022 5.5  1.4 - 7.0 x10E3/uL Final    Lymphs (Absolute) 04/11/2022 1.7  0.7 - 3.1 x10E3/uL Final    Monocytes(Absolute) 04/11/2022 0.8  0.1 - 0.9 x10E3/uL Final    Eos (Absolute) 04/11/2022 0.0  0.0 - 0.4 x10E3/uL Final    Baso (Absolute) 04/11/2022 0.0  0.0 - 0.2 x10E3/uL Final    Immature Granulocytes 04/11/2022 0  Not Estab. % Final    Immature Grans (Abs) 04/11/2022 0.0  0.0 - 0.1 x10E3/uL Final    Glucose 04/11/2022 136 (A) 65 - 99 mg/dL Final    BUN 04/11/2022 21  8 - 27 mg/dL Final    Creatinine 04/11/2022 1.19  0.76 - 1.27  mg/dL Final    eGFR no Race variable 04/11/2022 69  >59 mL/min/1.73 Final    BUN/Creatinine Ratio 04/11/2022 18  10 - 24 Final    Sodium 04/11/2022 137  134 - 144 mmol/L Final    Potassium 04/11/2022 5.3 (A) 3.5 - 5.2 mmol/L Final    Chloride 04/11/2022 97  96 - 106 mmol/L Final    CO2 04/11/2022 22  20 - 29 mmol/L Final    Calcium 04/11/2022 9.8  8.6 - 10.2 mg/dL Final    Protein, Total 04/11/2022 7.0  6.0 - 8.5 g/dL Final    Albumin 04/11/2022 4.5  3.8 - 4.8 g/dL Final    Globulin, Total 04/11/2022 2.5  1.5 - 4.5 g/dL Final    Albumin/Globulin Ratio 04/11/2022 1.8  1.2 - 2.2 Final    Total Bilirubin 04/11/2022 0.5  0.0 - 1.2 mg/dL Final    Alkaline Phosphatase 04/11/2022 140 (A) 44 - 121 IU/L Final    AST 04/11/2022 35  0 - 40 IU/L Final    ALT 04/11/2022 40  0 - 44 IU/L Final    Cholesterol 04/11/2022 205 (A) 100 - 199 mg/dL Final    Triglycerides 04/11/2022 67  0 - 149 mg/dL Final    HDL 04/11/2022 55  >39 mg/dL Final    VLDL Cholesterol Rikki 04/11/2022 12  5 - 40 mg/dL Final    LDL Calculated 04/11/2022 138 (A) 0 - 99 mg/dL Final    Specific Gravity, UA 04/11/2022 >=1.030 (A) 1.005 - 1.030 Final    pH, UA 04/11/2022 5.0  5.0 - 7.5 Final    Color, UA 04/11/2022 Yellow  Yellow Final    Clarity, UA 04/11/2022 Clear  Clear Final    Leukocytes, UA 04/11/2022 Negative  Negative Final    Protein, UA 04/11/2022 Trace  Negative/Trace Final    Glucose, UA 04/11/2022 Negative  Negative Final    Ketones, UA 04/11/2022 Trace (A) Negative Final    Occult Blood UA 04/11/2022 Negative  Negative Final    Bilirubin, UA 04/11/2022 Negative  Negative Final    Urobilinogen, UA 04/11/2022 1.0  0.2 - 1.0 mg/dL Final    Nitrite, UA 04/11/2022 Negative  Negative Final    Microscopic Examination 04/11/2022 Comment   Final    Microscopic Examination 04/11/2022 See below:   Final    Urinalysis Reflex 04/11/2022 Comment   Final    PSA - [facility] 04/11/2022 1.1  0.0 - 4.0 ng/mL Final    TSH 04/11/2022  3.900  0.450 - 4.500 uIU/mL Final    Creatinine, Urine 04/11/2022 356.9  Not Estab. mg/dL Final    Microalb, Ur 04/11/2022 20.2  Not Estab. ug/mL Final    Microalb/Crt. Ratio 04/11/2022 6  0 - 29 mg/g creat Final    WBC, UA 04/11/2022 None seen  0 - 5 /hpf Final    RBC, UA 04/11/2022 0-2  0 - 2 /hpf Final    Epithelial Cells (non renal) 04/11/2022 0-10  0 - 10 /hpf Final    Casts 04/11/2022 None seen  None seen /lpf Final    Bacteria, UA 04/11/2022 None seen  None seen/Few Final   Office Visit on 01/04/2022   Component Date Value Ref Range Status    POC Rapid COVID 01/04/2022 Positive (A) Negative Final     Acceptable 01/04/2022 Yes   Final       Past Medical History:   Diagnosis Date    Coronary artery disease     Diabetes mellitus 07/1922    pt on metformin in past, states DM resolved so no longer taking    Hypertension     MI (myocardial infarction)      Social History     Socioeconomic History    Marital status:    Tobacco Use    Smoking status: Former Smoker    Smokeless tobacco: Never Used   Substance and Sexual Activity    Alcohol use: Yes     Comment: Occasionally    Drug use: Never    Sexual activity: Yes     Partners: Female     Birth control/protection: Coitus interruptus     Past Surgical History:   Procedure Laterality Date    cardiac stents      x 7    SHOULDER SURGERY Right 1975     History reviewed. No pertinent family history.    Review of patient's allergies indicates:  No Known Allergies    Current Outpatient Medications:     aspirin (ECOTRIN) 81 MG EC tablet, Aspir-81 mg tablet,delayed release  Take 1 tablet every day by oral route., Disp: , Rfl:     buPROPion (WELLBUTRIN XL) 150 MG TB24 tablet, Take 1 tablet (150 mg total) by mouth 2 (two) times daily., Disp: 180 tablet, Rfl: 1    losartan-hydrochlorothiazide 50-12.5 mg (HYZAAR) 50-12.5 mg per tablet, Take 1 tablet by mouth once daily., Disp: 180 tablet, Rfl: 1    metoprolol succinate (TOPROL-XL)  "25 MG 24 hr tablet, Take 1 tablet (25 mg total) by mouth nightly., Disp: 90 tablet, Rfl: 1    rosuvastatin (CRESTOR) 40 MG Tab, Take 1 tablet (40 mg total) by mouth every evening., Disp: 90 tablet, Rfl: 1    sildenafiL (VIAGRA) 100 MG tablet, Take 1 tablet (100 mg total) by mouth daily as needed for Erectile Dysfunction., Disp: 30 tablet, Rfl: 2    Review of Systems   Constitutional: Negative for fatigue, fever and unexpected weight change.   HENT: Negative for ear pain, sinus pressure and sore throat.    Eyes: Negative for pain.   Respiratory: Negative for cough and shortness of breath.    Cardiovascular: Negative for chest pain and leg swelling.   Gastrointestinal: Negative for abdominal pain, constipation, nausea and vomiting.   Genitourinary: Negative for dysuria, frequency and urgency.   Musculoskeletal: Positive for back pain and neck pain. Negative for arthralgias.   Skin: Negative for rash.   Neurological: Negative for dizziness, weakness and headaches.   Psychiatric/Behavioral: Negative for sleep disturbance.          Objective:      Vitals:    04/11/22 0840   BP: 112/84   Pulse: 60   Weight: (!) 141.1 kg (311 lb)   Height: 6' 4" (1.93 m)     Physical Exam  Constitutional:       Appearance: He is well-developed. He is obese.   HENT:      Head: Normocephalic and atraumatic.      Right Ear: External ear normal.      Left Ear: External ear normal.   Eyes:      Pupils: Pupils are equal, round, and reactive to light.   Neck:      Trachea: No tracheal deviation.   Cardiovascular:      Rate and Rhythm: Normal rate and regular rhythm.      Heart sounds: No murmur heard.    No friction rub. No gallop.   Pulmonary:      Breath sounds: Normal breath sounds. No stridor. No wheezing or rales.   Abdominal:      Palpations: Abdomen is soft. There is no mass.      Tenderness: There is no abdominal tenderness.   Musculoskeletal:         General: No deformity.      Cervical back: Neck supple. Tenderness present. Decreased " range of motion.      Lumbar back: No tenderness. Normal range of motion.   Lymphadenopathy:      Cervical: No cervical adenopathy.   Skin:     General: Skin is warm and dry.   Neurological:      Mental Status: He is alert and oriented to person, place, and time.      Coordination: Coordination normal.   Psychiatric:         Thought Content: Thought content normal.           Assessment:       1. Special screening for malignant neoplasms, colon    2. Depressive disorder    3. Hypertension, unspecified type    4. Hypertension, unspecified type    5. Hyperlipidemia, unspecified hyperlipidemia type    6. Encounter for long-term (current) use of other medications    7. Special screening for malignant neoplasm of prostate    8. Obesity, unspecified classification, unspecified obesity type, unspecified whether serious comorbidity present    9. Cervical radiculopathy    10. Sleep apnea, unspecified type         Plan:       Special screening for malignant neoplasms, colon  -     Cologuard Screening (Multitarget Stool DNA); Future; Expected date: 04/13/2022    Depressive disorder  -     buPROPion (WELLBUTRIN XL) 150 MG TB24 tablet; Take 1 tablet (150 mg total) by mouth 2 (two) times daily.  Dispense: 180 tablet; Refill: 1    Hypertension, unspecified type  -     losartan-hydrochlorothiazide 50-12.5 mg (HYZAAR) 50-12.5 mg per tablet; Take 1 tablet by mouth once daily.  Dispense: 180 tablet; Refill: 1  -     metoprolol succinate (TOPROL-XL) 25 MG 24 hr tablet; Take 1 tablet (25 mg total) by mouth nightly.  Dispense: 90 tablet; Refill: 1  -     CBC Auto Differential; Future; Expected date: 04/11/2022  -     Comprehensive Metabolic Panel; Future; Expected date: 04/11/2022  -     Lipid Panel; Future; Expected date: 04/11/2022  -     Urinalysis, Reflex to Urine Culture Urine, Clean Catch; Future; Expected date: 04/11/2022  -     TSH w/reflex to FT4; Future; Expected date: 04/11/2022  -     Microalbumin/Creatinine Ratio, Urine;  Future; Expected date: 04/11/2022    Hypertension, unspecified type  Comments:  monitor bp daily. resume previous meds. readings to next ov.   Orders:  -     losartan-hydrochlorothiazide 50-12.5 mg (HYZAAR) 50-12.5 mg per tablet; Take 1 tablet by mouth once daily.  Dispense: 180 tablet; Refill: 1  -     metoprolol succinate (TOPROL-XL) 25 MG 24 hr tablet; Take 1 tablet (25 mg total) by mouth nightly.  Dispense: 90 tablet; Refill: 1  -     CBC Auto Differential; Future; Expected date: 04/11/2022  -     Comprehensive Metabolic Panel; Future; Expected date: 04/11/2022  -     Lipid Panel; Future; Expected date: 04/11/2022  -     Urinalysis, Reflex to Urine Culture Urine, Clean Catch; Future; Expected date: 04/11/2022  -     TSH w/reflex to FT4; Future; Expected date: 04/11/2022  -     Microalbumin/Creatinine Ratio, Urine; Future; Expected date: 04/11/2022    Hyperlipidemia, unspecified hyperlipidemia type  -     rosuvastatin (CRESTOR) 40 MG Tab; Take 1 tablet (40 mg total) by mouth every evening.  Dispense: 90 tablet; Refill: 1  -     CBC Auto Differential; Future; Expected date: 04/11/2022  -     Comprehensive Metabolic Panel; Future; Expected date: 04/11/2022  -     Lipid Panel; Future; Expected date: 04/11/2022  -     Urinalysis, Reflex to Urine Culture Urine, Clean Catch; Future; Expected date: 04/11/2022  -     TSH w/reflex to FT4; Future; Expected date: 04/11/2022  -     Microalbumin/Creatinine Ratio, Urine; Future; Expected date: 04/11/2022    Encounter for long-term (current) use of other medications  -     CBC Auto Differential; Future; Expected date: 04/11/2022  -     Comprehensive Metabolic Panel; Future; Expected date: 04/11/2022  -     Lipid Panel; Future; Expected date: 04/11/2022  -     Urinalysis, Reflex to Urine Culture Urine, Clean Catch; Future; Expected date: 04/11/2022  -     TSH w/reflex to FT4; Future; Expected date: 04/11/2022  -     Microalbumin/Creatinine Ratio, Urine; Future; Expected date:  04/11/2022    Special screening for malignant neoplasm of prostate  -     PSA, Screening; Future; Expected date: 04/11/2022    Obesity, unspecified classification, unspecified obesity type, unspecified whether serious comorbidity present    Cervical radiculopathy    Sleep apnea, unspecified type    Other orders  -     Microscopic Examination      No follow-ups on file.        4/13/2022 Melissa Thurston

## 2022-05-17 ENCOUNTER — PATIENT MESSAGE (OUTPATIENT)
Dept: FAMILY MEDICINE | Facility: CLINIC | Age: 62
End: 2022-05-17

## 2022-05-24 ENCOUNTER — TELEPHONE (OUTPATIENT)
Dept: FAMILY MEDICINE | Facility: CLINIC | Age: 62
End: 2022-05-24

## 2022-05-24 NOTE — TELEPHONE ENCOUNTER
----- Message from Martha Sage sent at 5/24/2022  8:51 AM CDT -----  Kitty with [facility] called and stated that the patient is there now for an appointment and she need a copy of the patient most recent labs. If any questions please give her a call at [phone] her fax number is [phone]

## 2022-06-30 ENCOUNTER — TELEPHONE (OUTPATIENT)
Dept: FAMILY MEDICINE | Facility: CLINIC | Age: 62
End: 2022-06-30

## 2022-06-30 NOTE — TELEPHONE ENCOUNTER
----- Message from Martha Sage sent at 6/30/2022 11:57 AM CDT -----  Patient wife (Archana)called and stated that the patient is having surgery on 07/15/22 and he need to come for a surgery clearance before then please give her a call at [phone]

## 2022-07-07 ENCOUNTER — TELEPHONE (OUTPATIENT)
Dept: FAMILY MEDICINE | Facility: CLINIC | Age: 62
End: 2022-07-07

## 2022-07-07 NOTE — TELEPHONE ENCOUNTER
----- Message from Martha Sage sent at 7/7/2022 12:47 PM CDT -----  Patient called he was on his way to his appointment and he had a blow out and he would like to know if he can come in later today please give him a call at [phone]

## 2022-07-11 ENCOUNTER — OFFICE VISIT (OUTPATIENT)
Dept: FAMILY MEDICINE | Facility: CLINIC | Age: 62
End: 2022-07-11
Payer: COMMERCIAL

## 2022-07-11 VITALS
BODY MASS INDEX: 37.87 KG/M2 | SYSTOLIC BLOOD PRESSURE: 126 MMHG | HEIGHT: 76 IN | DIASTOLIC BLOOD PRESSURE: 62 MMHG | HEART RATE: 56 BPM | WEIGHT: 311 LBS

## 2022-07-11 DIAGNOSIS — F32.A DEPRESSIVE DISORDER: ICD-10-CM

## 2022-07-11 DIAGNOSIS — I25.10 CORONARY ARTERY DISEASE, UNSPECIFIED VESSEL OR LESION TYPE, UNSPECIFIED WHETHER ANGINA PRESENT, UNSPECIFIED WHETHER NATIVE OR TRANSPLANTED HEART: ICD-10-CM

## 2022-07-11 DIAGNOSIS — M54.12 CERVICAL RADICULOPATHY: ICD-10-CM

## 2022-07-11 DIAGNOSIS — Z01.818 PRE-OPERATIVE CLEARANCE: Primary | ICD-10-CM

## 2022-07-11 DIAGNOSIS — Z79.899 HIGH RISK MEDICATION USE: ICD-10-CM

## 2022-07-11 DIAGNOSIS — I10 HYPERTENSION, UNSPECIFIED TYPE: ICD-10-CM

## 2022-07-11 DIAGNOSIS — E78.5 HYPERLIPIDEMIA, UNSPECIFIED HYPERLIPIDEMIA TYPE: ICD-10-CM

## 2022-07-11 LAB
EKG 12-LEAD: NORMAL
PR INTERVAL: NORMAL
PRT AXES: NORMAL
QRS DURATION: NORMAL
QT/QTC: NORMAL
VENTRICULAR RATE: NORMAL

## 2022-07-11 PROCEDURE — 99214 OFFICE O/P EST MOD 30 MIN: CPT | Mod: 25,S$GLB,, | Performed by: NURSE PRACTITIONER

## 2022-07-11 PROCEDURE — 3074F SYST BP LT 130 MM HG: CPT | Mod: CPTII,S$GLB,, | Performed by: NURSE PRACTITIONER

## 2022-07-11 PROCEDURE — 93000 ELECTROCARDIOGRAM COMPLETE: CPT | Mod: S$GLB,,, | Performed by: NURSE PRACTITIONER

## 2022-07-11 PROCEDURE — 3061F NEG MICROALBUMINURIA REV: CPT | Mod: CPTII,S$GLB,, | Performed by: NURSE PRACTITIONER

## 2022-07-11 PROCEDURE — 1159F PR MEDICATION LIST DOCUMENTED IN MEDICAL RECORD: ICD-10-PCS | Mod: CPTII,S$GLB,, | Performed by: NURSE PRACTITIONER

## 2022-07-11 PROCEDURE — 3061F PR NEG MICROALBUMINURIA RESULT DOCUMENTED/REVIEW: ICD-10-PCS | Mod: CPTII,S$GLB,, | Performed by: NURSE PRACTITIONER

## 2022-07-11 PROCEDURE — 93000 POCT EKG 12-LEAD: ICD-10-PCS | Mod: S$GLB,,, | Performed by: NURSE PRACTITIONER

## 2022-07-11 PROCEDURE — 99214 PR OFFICE/OUTPT VISIT, EST, LEVL IV, 30-39 MIN: ICD-10-PCS | Mod: 25,S$GLB,, | Performed by: NURSE PRACTITIONER

## 2022-07-11 PROCEDURE — 3074F PR MOST RECENT SYSTOLIC BLOOD PRESSURE < 130 MM HG: ICD-10-PCS | Mod: CPTII,S$GLB,, | Performed by: NURSE PRACTITIONER

## 2022-07-11 PROCEDURE — 3078F PR MOST RECENT DIASTOLIC BLOOD PRESSURE < 80 MM HG: ICD-10-PCS | Mod: CPTII,S$GLB,, | Performed by: NURSE PRACTITIONER

## 2022-07-11 PROCEDURE — 3008F PR BODY MASS INDEX (BMI) DOCUMENTED: ICD-10-PCS | Mod: CPTII,S$GLB,, | Performed by: NURSE PRACTITIONER

## 2022-07-11 PROCEDURE — 1160F PR REVIEW ALL MEDS BY PRESCRIBER/CLIN PHARMACIST DOCUMENTED: ICD-10-PCS | Mod: CPTII,S$GLB,, | Performed by: NURSE PRACTITIONER

## 2022-07-11 PROCEDURE — 3078F DIAST BP <80 MM HG: CPT | Mod: CPTII,S$GLB,, | Performed by: NURSE PRACTITIONER

## 2022-07-11 PROCEDURE — 3066F PR DOCUMENTATION OF TREATMENT FOR NEPHROPATHY: ICD-10-PCS | Mod: CPTII,S$GLB,, | Performed by: NURSE PRACTITIONER

## 2022-07-11 PROCEDURE — 3008F BODY MASS INDEX DOCD: CPT | Mod: CPTII,S$GLB,, | Performed by: NURSE PRACTITIONER

## 2022-07-11 PROCEDURE — 3066F NEPHROPATHY DOC TX: CPT | Mod: CPTII,S$GLB,, | Performed by: NURSE PRACTITIONER

## 2022-07-11 PROCEDURE — 1159F MED LIST DOCD IN RCRD: CPT | Mod: CPTII,S$GLB,, | Performed by: NURSE PRACTITIONER

## 2022-07-11 PROCEDURE — 1160F RVW MEDS BY RX/DR IN RCRD: CPT | Mod: CPTII,S$GLB,, | Performed by: NURSE PRACTITIONER

## 2022-07-11 RX ORDER — GABAPENTIN 300 MG/1
300 CAPSULE ORAL DAILY
COMMUNITY
Start: 2022-05-24 | End: 2023-12-27

## 2022-07-11 NOTE — PROGRESS NOTES
SUBJECTIVE:    Patient ID: Dayday Rodriguez is a 61 y.o. male.    Chief Complaint: Pre-op Exam (Carpal tunnel, went over meds verbally// SW)    61 year old male presents for surgery clearance. Treated for depression, cad, hypertension, hyperlipidemia and neuropathy. Scheduled to have carpal tunnel surgery with Dr. English.  Taking meds as prescribed. bp in office today is 126/62. Appears to be well controlled. Last labs were in April. No further complaints today.       Office Visit on 04/11/2022   Component Date Value Ref Range Status    WBC 04/11/2022 8.0  3.4 - 10.8 x10E3/uL Final    RBC 04/11/2022 5.91 (A) 4.14 - 5.80 x10E6/uL Final    Hemoglobin 04/11/2022 17.5  13.0 - 17.7 g/dL Final    Hematocrit 04/11/2022 52.4 (A) 37.5 - 51.0 % Final    MCV 04/11/2022 89  79 - 97 fL Final    MCH 04/11/2022 29.6  26.6 - 33.0 pg Final    MCHC 04/11/2022 33.4  31.5 - 35.7 g/dL Final    RDW 04/11/2022 13.5  11.6 - 15.4 % Final    Platelets 04/11/2022 222  150 - 450 x10E3/uL Final    Neutrophils 04/11/2022 69  Not Estab. % Final    Lymphs 04/11/2022 21  Not Estab. % Final    Monocytes 04/11/2022 10  Not Estab. % Final    Eos 04/11/2022 0  Not Estab. % Final    Basos 04/11/2022 0  Not Estab. % Final    Neutrophils (Absolute) 04/11/2022 5.5  1.4 - 7.0 x10E3/uL Final    Lymphs (Absolute) 04/11/2022 1.7  0.7 - 3.1 x10E3/uL Final    Monocytes(Absolute) 04/11/2022 0.8  0.1 - 0.9 x10E3/uL Final    Eos (Absolute) 04/11/2022 0.0  0.0 - 0.4 x10E3/uL Final    Baso (Absolute) 04/11/2022 0.0  0.0 - 0.2 x10E3/uL Final    Immature Granulocytes 04/11/2022 0  Not Estab. % Final    Immature Grans (Abs) 04/11/2022 0.0  0.0 - 0.1 x10E3/uL Final    Glucose 04/11/2022 136 (A) 65 - 99 mg/dL Final    BUN 04/11/2022 21  8 - 27 mg/dL Final    Creatinine 04/11/2022 1.19  0.76 - 1.27 mg/dL Final    eGFR no Race variable 04/11/2022 69  >59 mL/min/1.73 Final    BUN/Creatinine Ratio 04/11/2022 18  10 - 24 Final    Sodium 04/11/2022  137  134 - 144 mmol/L Final    Potassium 04/11/2022 5.3 (A) 3.5 - 5.2 mmol/L Final    Chloride 04/11/2022 97  96 - 106 mmol/L Final    CO2 04/11/2022 22  20 - 29 mmol/L Final    Calcium 04/11/2022 9.8  8.6 - 10.2 mg/dL Final    Protein, Total 04/11/2022 7.0  6.0 - 8.5 g/dL Final    Albumin 04/11/2022 4.5  3.8 - 4.8 g/dL Final    Globulin, Total 04/11/2022 2.5  1.5 - 4.5 g/dL Final    Albumin/Globulin Ratio 04/11/2022 1.8  1.2 - 2.2 Final    Total Bilirubin 04/11/2022 0.5  0.0 - 1.2 mg/dL Final    Alkaline Phosphatase 04/11/2022 140 (A) 44 - 121 IU/L Final    AST 04/11/2022 35  0 - 40 IU/L Final    ALT 04/11/2022 40  0 - 44 IU/L Final    Cholesterol 04/11/2022 205 (A) 100 - 199 mg/dL Final    Triglycerides 04/11/2022 67  0 - 149 mg/dL Final    HDL 04/11/2022 55  >39 mg/dL Final    VLDL Cholesterol Rikki 04/11/2022 12  5 - 40 mg/dL Final    LDL Calculated 04/11/2022 138 (A) 0 - 99 mg/dL Final    Specific Gravity, UA 04/11/2022 >=1.030 (A) 1.005 - 1.030 Final    pH, UA 04/11/2022 5.0  5.0 - 7.5 Final    Color, UA 04/11/2022 Yellow  Yellow Final    Clarity, UA 04/11/2022 Clear  Clear Final    Leukocytes, UA 04/11/2022 Negative  Negative Final    Protein, UA 04/11/2022 Trace  Negative/Trace Final    Glucose, UA 04/11/2022 Negative  Negative Final    Ketones, UA 04/11/2022 Trace (A) Negative Final    Occult Blood UA 04/11/2022 Negative  Negative Final    Bilirubin, UA 04/11/2022 Negative  Negative Final    Urobilinogen, UA 04/11/2022 1.0  0.2 - 1.0 mg/dL Final    Nitrite, UA 04/11/2022 Negative  Negative Final    Microscopic Examination 04/11/2022 Comment   Final    Microscopic Examination 04/11/2022 See below:   Final    Urinalysis Reflex 04/11/2022 Comment   Final    PSA - [facility] 04/11/2022 1.1  0.0 - 4.0 ng/mL Final    TSH 04/11/2022 3.900  0.450 - 4.500 uIU/mL Final    Creatinine, Urine 04/11/2022 356.9  Not Estab. mg/dL Final    Microalb, Ur 04/11/2022 20.2  Not Estab. ug/mL Final     Microalb/Crt. Ratio 04/11/2022 6  0 - 29 mg/g creat Final    WBC, UA 04/11/2022 None seen  0 - 5 /hpf Final    RBC, UA 04/11/2022 0-2  0 - 2 /hpf Final    Epithelial Cells (non renal) 04/11/2022 0-10  0 - 10 /hpf Final    Casts 04/11/2022 None seen  None seen /lpf Final    Bacteria, UA 04/11/2022 None seen  None seen/Few Final       Past Medical History:   Diagnosis Date    Coronary artery disease     Diabetes mellitus 07/1922    pt on metformin in past, states DM resolved so no longer taking    Hypertension     MI (myocardial infarction)      Social History     Socioeconomic History    Marital status:    Tobacco Use    Smoking status: Former Smoker    Smokeless tobacco: Never Used   Substance and Sexual Activity    Alcohol use: Yes     Comment: Occasionally    Drug use: Never    Sexual activity: Yes     Partners: Female     Birth control/protection: Coitus interruptus     Past Surgical History:   Procedure Laterality Date    cardiac stents      x 7    SHOULDER SURGERY Right 1975     History reviewed. No pertinent family history.    Review of patient's allergies indicates:  No Known Allergies    Current Outpatient Medications:     aspirin (ECOTRIN) 81 MG EC tablet, Aspir-81 mg tablet,delayed release  Take 1 tablet every day by oral route., Disp: , Rfl:     buPROPion (WELLBUTRIN XL) 150 MG TB24 tablet, Take 1 tablet (150 mg total) by mouth 2 (two) times daily., Disp: 180 tablet, Rfl: 1    gabapentin (NEURONTIN) 300 MG capsule, Take 300 mg by mouth once daily., Disp: , Rfl:     losartan-hydrochlorothiazide 50-12.5 mg (HYZAAR) 50-12.5 mg per tablet, Take 1 tablet by mouth once daily., Disp: 180 tablet, Rfl: 1    metoprolol succinate (TOPROL-XL) 25 MG 24 hr tablet, Take 1 tablet (25 mg total) by mouth nightly., Disp: 90 tablet, Rfl: 1    rosuvastatin (CRESTOR) 40 MG Tab, Take 1 tablet (40 mg total) by mouth every evening., Disp: 90 tablet, Rfl: 1    sildenafiL (VIAGRA) 100 MG tablet,  "Take 1 tablet (100 mg total) by mouth daily as needed for Erectile Dysfunction., Disp: 30 tablet, Rfl: 2    Review of Systems   Constitutional: Negative for fatigue, fever and unexpected weight change.   HENT: Negative for ear pain, sinus pressure and sore throat.    Eyes: Negative for pain.   Respiratory: Negative for cough and shortness of breath.    Cardiovascular: Negative for chest pain and leg swelling.   Gastrointestinal: Negative for abdominal pain, constipation, nausea and vomiting.   Genitourinary: Negative for dysuria, frequency and urgency.   Musculoskeletal: Negative for arthralgias.   Skin: Negative for rash.   Neurological: Negative for dizziness, weakness and headaches.   Psychiatric/Behavioral: Negative for sleep disturbance.          Objective:      Vitals:    07/11/22 0839   BP: 126/62   Pulse: (!) 56   Weight: (!) 141.1 kg (311 lb)   Height: 6' 4" (1.93 m)     Physical Exam  Vitals and nursing note reviewed.   Constitutional:       General: He is not in acute distress.     Appearance: Normal appearance. He is well-developed. He is obese. He is not ill-appearing.   HENT:      Right Ear: External ear normal.      Left Ear: External ear normal.   Eyes:      Pupils: Pupils are equal, round, and reactive to light.   Neck:      Trachea: No tracheal deviation.   Cardiovascular:      Rate and Rhythm: Normal rate and regular rhythm.      Heart sounds: No murmur heard.    No friction rub. No gallop.   Pulmonary:      Breath sounds: Normal breath sounds. No stridor. No wheezing or rales.   Abdominal:      Palpations: Abdomen is soft. There is no mass.      Tenderness: There is no abdominal tenderness.   Musculoskeletal:         General: No tenderness or deformity.      Cervical back: Neck supple.   Lymphadenopathy:      Cervical: No cervical adenopathy.   Skin:     General: Skin is warm and dry.   Neurological:      Mental Status: He is alert and oriented to person, place, and time.      Coordination: " Coordination normal.   Psychiatric:         Thought Content: Thought content normal.           Assessment:       1. Pre-operative clearance    2. Coronary artery disease, unspecified vessel or lesion type, unspecified whether angina present, unspecified whether native or transplanted heart    3. Depressive disorder    4. Hypertension, unspecified type    5. Hyperlipidemia, unspecified hyperlipidemia type    6. Cervical radiculopathy    7. High risk medication use         Plan:       Pre-operative clearance  Comments:  medically cleared for carpal tunnel surgery  Orders:  -     POCT EKG 12-LEAD (NOT FOR [facility] USE)    Coronary artery disease, unspecified vessel or lesion type, unspecified whether angina present, unspecified whether native or transplanted heart    Depressive disorder  Comments:  stable    Hypertension, unspecified type  Comments:  well controlled    Hyperlipidemia, unspecified hyperlipidemia type    Cervical radiculopathy    High risk medication use      Follow up in 6 months (on 1/11/2023), or if symptoms worsen or fail to improve, for medication management.        7/11/2022 Melissa Thurston

## 2022-08-06 DIAGNOSIS — E78.5 HYPERLIPIDEMIA, UNSPECIFIED HYPERLIPIDEMIA TYPE: ICD-10-CM

## 2022-08-06 RX ORDER — ROSUVASTATIN CALCIUM 40 MG/1
40 TABLET, COATED ORAL NIGHTLY
Qty: 90 TABLET | Refills: 1 | Status: CANCELLED | OUTPATIENT
Start: 2022-08-06

## 2022-10-25 ENCOUNTER — TELEPHONE (OUTPATIENT)
Dept: PULMONOLOGY | Facility: CLINIC | Age: 62
End: 2022-10-25
Payer: COMMERCIAL

## 2022-10-25 NOTE — TELEPHONE ENCOUNTER
Tried calling patient but no answer & voicemail was full couldn't leave voicemail.       ----- Message from Ethel Wright sent at 10/25/2022  7:33 AM CDT -----  Regarding: sameday appointment  Contact: patient  Type:  Same Day Appointment Request    Caller is requesting a same day appointment.  Caller declined first available appointment listed below.      Name of Caller:  Patient   When is the first available appointment?  December  Symptoms:  on and off shortness of breath  Best Call Back Number:  [phone] (home) [phone] (work)    Case number [id number]  Denied on call nurse patient was feeling ok

## 2022-10-27 ENCOUNTER — OFFICE VISIT (OUTPATIENT)
Dept: PULMONOLOGY | Facility: CLINIC | Age: 62
End: 2022-10-27
Payer: COMMERCIAL

## 2022-10-27 ENCOUNTER — PATIENT MESSAGE (OUTPATIENT)
Dept: PULMONOLOGY | Facility: CLINIC | Age: 62
End: 2022-10-27

## 2022-10-27 ENCOUNTER — HOSPITAL ENCOUNTER (OUTPATIENT)
Dept: RADIOLOGY | Facility: HOSPITAL | Age: 62
Discharge: HOME | End: 2022-10-27
Attending: INTERNAL MEDICINE
Payer: COMMERCIAL

## 2022-10-27 VITALS
BODY MASS INDEX: 38.2 KG/M2 | DIASTOLIC BLOOD PRESSURE: 90 MMHG | HEIGHT: 76 IN | HEART RATE: 61 BPM | SYSTOLIC BLOOD PRESSURE: 142 MMHG | WEIGHT: 313.69 LBS | OXYGEN SATURATION: 98 %

## 2022-10-27 DIAGNOSIS — E11.40 TYPE 2 DIABETES MELLITUS WITH DIABETIC NEUROPATHY, WITHOUT LONG-TERM CURRENT USE OF INSULIN: ICD-10-CM

## 2022-10-27 DIAGNOSIS — D75.1 POLYCYTHEMIA: Primary | ICD-10-CM

## 2022-10-27 DIAGNOSIS — G47.33 OBSTRUCTIVE SLEEP APNEA SYNDROME: ICD-10-CM

## 2022-10-27 DIAGNOSIS — R06.02 SOB (SHORTNESS OF BREATH): ICD-10-CM

## 2022-10-27 DIAGNOSIS — R09.89 CHRONIC SINUS COMPLAINTS: ICD-10-CM

## 2022-10-27 DIAGNOSIS — R73.03 PREDIABETES: ICD-10-CM

## 2022-10-27 PROCEDURE — 1159F PR MEDICATION LIST DOCUMENTED IN MEDICAL RECORD: ICD-10-PCS | Mod: CPTII,S$GLB,, | Performed by: INTERNAL MEDICINE

## 2022-10-27 PROCEDURE — 99999 PR PBB SHADOW E&M-EST. PATIENT-LVL IV: ICD-10-PCS | Mod: PBBFAC,,, | Performed by: INTERNAL MEDICINE

## 2022-10-27 PROCEDURE — 3077F PR MOST RECENT SYSTOLIC BLOOD PRESSURE >= 140 MM HG: ICD-10-PCS | Mod: CPTII,S$GLB,, | Performed by: INTERNAL MEDICINE

## 2022-10-27 PROCEDURE — 99999 PR PBB SHADOW E&M-EST. PATIENT-LVL IV: CPT | Mod: PBBFAC,,, | Performed by: INTERNAL MEDICINE

## 2022-10-27 PROCEDURE — 3077F SYST BP >= 140 MM HG: CPT | Mod: CPTII,S$GLB,, | Performed by: INTERNAL MEDICINE

## 2022-10-27 PROCEDURE — 3061F PR NEG MICROALBUMINURIA RESULT DOCUMENTED/REVIEW: ICD-10-PCS | Mod: CPTII,S$GLB,, | Performed by: INTERNAL MEDICINE

## 2022-10-27 PROCEDURE — 71046 X-RAY EXAM CHEST 2 VIEWS: CPT | Mod: TC,FY

## 2022-10-27 PROCEDURE — 3061F NEG MICROALBUMINURIA REV: CPT | Mod: CPTII,S$GLB,, | Performed by: INTERNAL MEDICINE

## 2022-10-27 PROCEDURE — 3080F DIAST BP >= 90 MM HG: CPT | Mod: CPTII,S$GLB,, | Performed by: INTERNAL MEDICINE

## 2022-10-27 PROCEDURE — 71046 XR CHEST PA AND LATERAL: ICD-10-PCS | Mod: 26,,, | Performed by: RADIOLOGY

## 2022-10-27 PROCEDURE — 3080F PR MOST RECENT DIASTOLIC BLOOD PRESSURE >= 90 MM HG: ICD-10-PCS | Mod: CPTII,S$GLB,, | Performed by: INTERNAL MEDICINE

## 2022-10-27 PROCEDURE — 71046 X-RAY EXAM CHEST 2 VIEWS: CPT | Mod: 26,,, | Performed by: RADIOLOGY

## 2022-10-27 PROCEDURE — 3066F PR DOCUMENTATION OF TREATMENT FOR NEPHROPATHY: ICD-10-PCS | Mod: CPTII,S$GLB,, | Performed by: INTERNAL MEDICINE

## 2022-10-27 PROCEDURE — 99204 PR OFFICE/OUTPT VISIT, NEW, LEVL IV, 45-59 MIN: ICD-10-PCS | Mod: S$GLB,,, | Performed by: INTERNAL MEDICINE

## 2022-10-27 PROCEDURE — 3066F NEPHROPATHY DOC TX: CPT | Mod: CPTII,S$GLB,, | Performed by: INTERNAL MEDICINE

## 2022-10-27 PROCEDURE — 1159F MED LIST DOCD IN RCRD: CPT | Mod: CPTII,S$GLB,, | Performed by: INTERNAL MEDICINE

## 2022-10-27 PROCEDURE — 99204 OFFICE O/P NEW MOD 45 MIN: CPT | Mod: S$GLB,,, | Performed by: INTERNAL MEDICINE

## 2022-10-27 RX ORDER — FLUTICASONE FUROATE, UMECLIDINIUM BROMIDE AND VILANTEROL TRIFENATATE 200; 62.5; 25 UG/1; UG/1; UG/1
1 POWDER RESPIRATORY (INHALATION) DAILY
Qty: 60 EACH | Refills: 11 | Status: SHIPPED | OUTPATIENT
Start: 2022-10-27 | End: 2024-01-09

## 2022-10-27 RX ORDER — FLUTICASONE PROPIONATE 50 MCG
1 SPRAY, SUSPENSION (ML) NASAL DAILY
Qty: 16 G | Refills: 11 | Status: SHIPPED | OUTPATIENT
Start: 2022-10-27 | End: 2023-11-01 | Stop reason: SDUPTHER

## 2022-10-27 RX ORDER — AZELASTINE 1 MG/ML
2 SPRAY, METERED NASAL 2 TIMES DAILY
Qty: 30 ML | Refills: 11 | Status: SHIPPED | OUTPATIENT
Start: 2022-10-27 | End: 2023-11-01 | Stop reason: SDUPTHER

## 2022-10-27 NOTE — PATIENT INSTRUCTIONS
Pattern of breathing changing suggest heart but not clear.      Heart rate too fast or too slow may cause short breath.  Need to check rate (and oxygen sat)  with spells short breath-- less than 30-40 or high over 120+ may cause breathing issues.  Toprol may slow heart.    Spell in office stable oxygen and pulse -- nothing remarkable in office.          Check chest xray    Do screening test for heart failure and blood clots.  Blood clots unlikely by history.      Home sleep study would be good -- blood count high in April. Recheck blood counts.  Will ask staff to do home sleep study-- over 5 episodes an hour would cause autocpap-- would try nasal mask.      Hematocrit was 52.4 - hemoglobin 17.5 -- in April-- your sister has thick blood?   You had prior sleep study.     Check hemoglobin a1c -- diabetes screen-- diabetes may cause numbness.     Trial trelegy once daily-- pattern not typical for lung disease.  Consider breathing test later.      Should have heart follow up.  Will do echocardiogram.    Use flonase - 1-2 each side nightly , use astelin as needed-- use bedtime.
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
## 2022-10-28 ENCOUNTER — TELEPHONE (OUTPATIENT)
Dept: PULMONOLOGY | Facility: CLINIC | Age: 62
End: 2022-10-28
Payer: COMMERCIAL

## 2022-10-28 RX ORDER — METFORMIN HYDROCHLORIDE 500 MG/1
500 TABLET ORAL 2 TIMES DAILY WITH MEALS
Qty: 60 TABLET | Refills: 3 | Status: SHIPPED | OUTPATIENT
Start: 2022-10-28 | End: 2022-11-10

## 2022-11-02 ENCOUNTER — PATIENT MESSAGE (OUTPATIENT)
Dept: FAMILY MEDICINE | Facility: CLINIC | Age: 62
End: 2022-11-02

## 2022-11-04 ENCOUNTER — PATIENT MESSAGE (OUTPATIENT)
Dept: FAMILY MEDICINE | Facility: CLINIC | Age: 62
End: 2022-11-04

## 2022-11-10 ENCOUNTER — OFFICE VISIT (OUTPATIENT)
Dept: FAMILY MEDICINE | Facility: CLINIC | Age: 62
End: 2022-11-10
Payer: COMMERCIAL

## 2022-11-10 VITALS
HEART RATE: 72 BPM | WEIGHT: 314 LBS | HEIGHT: 76 IN | SYSTOLIC BLOOD PRESSURE: 130 MMHG | BODY MASS INDEX: 38.24 KG/M2 | DIASTOLIC BLOOD PRESSURE: 82 MMHG

## 2022-11-10 DIAGNOSIS — F32.A DEPRESSIVE DISORDER: Primary | ICD-10-CM

## 2022-11-10 DIAGNOSIS — E78.5 HYPERLIPIDEMIA, UNSPECIFIED HYPERLIPIDEMIA TYPE: ICD-10-CM

## 2022-11-10 DIAGNOSIS — G47.33 OBSTRUCTIVE SLEEP APNEA SYNDROME: ICD-10-CM

## 2022-11-10 DIAGNOSIS — Z79.899 HIGH RISK MEDICATION USE: ICD-10-CM

## 2022-11-10 DIAGNOSIS — D75.1 POLYCYTHEMIA: ICD-10-CM

## 2022-11-10 DIAGNOSIS — E11.65 TYPE 2 DIABETES MELLITUS WITH HYPERGLYCEMIA, WITHOUT LONG-TERM CURRENT USE OF INSULIN: ICD-10-CM

## 2022-11-10 DIAGNOSIS — E66.9 OBESITY, UNSPECIFIED CLASSIFICATION, UNSPECIFIED OBESITY TYPE, UNSPECIFIED WHETHER SERIOUS COMORBIDITY PRESENT: ICD-10-CM

## 2022-11-10 DIAGNOSIS — I10 HYPERTENSION, UNSPECIFIED TYPE: ICD-10-CM

## 2022-11-10 DIAGNOSIS — I25.10 CORONARY ARTERY DISEASE, UNSPECIFIED VESSEL OR LESION TYPE, UNSPECIFIED WHETHER ANGINA PRESENT, UNSPECIFIED WHETHER NATIVE OR TRANSPLANTED HEART: ICD-10-CM

## 2022-11-10 PROCEDURE — 3079F DIAST BP 80-89 MM HG: CPT | Mod: CPTII,S$GLB,, | Performed by: NURSE PRACTITIONER

## 2022-11-10 PROCEDURE — 3052F PR MOST RECENT HEMOGLOBIN A1C LEVEL 8.0 - < 9.0%: ICD-10-PCS | Mod: CPTII,S$GLB,, | Performed by: NURSE PRACTITIONER

## 2022-11-10 PROCEDURE — 1159F PR MEDICATION LIST DOCUMENTED IN MEDICAL RECORD: ICD-10-PCS | Mod: CPTII,S$GLB,, | Performed by: NURSE PRACTITIONER

## 2022-11-10 PROCEDURE — 3008F BODY MASS INDEX DOCD: CPT | Mod: CPTII,S$GLB,, | Performed by: NURSE PRACTITIONER

## 2022-11-10 PROCEDURE — 3052F HG A1C>EQUAL 8.0%<EQUAL 9.0%: CPT | Mod: CPTII,S$GLB,, | Performed by: NURSE PRACTITIONER

## 2022-11-10 PROCEDURE — 3008F PR BODY MASS INDEX (BMI) DOCUMENTED: ICD-10-PCS | Mod: CPTII,S$GLB,, | Performed by: NURSE PRACTITIONER

## 2022-11-10 PROCEDURE — 3066F PR DOCUMENTATION OF TREATMENT FOR NEPHROPATHY: ICD-10-PCS | Mod: CPTII,S$GLB,, | Performed by: NURSE PRACTITIONER

## 2022-11-10 PROCEDURE — 3075F PR MOST RECENT SYSTOLIC BLOOD PRESS GE 130-139MM HG: ICD-10-PCS | Mod: CPTII,S$GLB,, | Performed by: NURSE PRACTITIONER

## 2022-11-10 PROCEDURE — 1160F PR REVIEW ALL MEDS BY PRESCRIBER/CLIN PHARMACIST DOCUMENTED: ICD-10-PCS | Mod: CPTII,S$GLB,, | Performed by: NURSE PRACTITIONER

## 2022-11-10 PROCEDURE — 3075F SYST BP GE 130 - 139MM HG: CPT | Mod: CPTII,S$GLB,, | Performed by: NURSE PRACTITIONER

## 2022-11-10 PROCEDURE — 3079F PR MOST RECENT DIASTOLIC BLOOD PRESSURE 80-89 MM HG: ICD-10-PCS | Mod: CPTII,S$GLB,, | Performed by: NURSE PRACTITIONER

## 2022-11-10 PROCEDURE — 3061F PR NEG MICROALBUMINURIA RESULT DOCUMENTED/REVIEW: ICD-10-PCS | Mod: CPTII,S$GLB,, | Performed by: NURSE PRACTITIONER

## 2022-11-10 PROCEDURE — 3061F NEG MICROALBUMINURIA REV: CPT | Mod: CPTII,S$GLB,, | Performed by: NURSE PRACTITIONER

## 2022-11-10 PROCEDURE — 1159F MED LIST DOCD IN RCRD: CPT | Mod: CPTII,S$GLB,, | Performed by: NURSE PRACTITIONER

## 2022-11-10 PROCEDURE — 3066F NEPHROPATHY DOC TX: CPT | Mod: CPTII,S$GLB,, | Performed by: NURSE PRACTITIONER

## 2022-11-10 PROCEDURE — 99214 PR OFFICE/OUTPT VISIT, EST, LEVL IV, 30-39 MIN: ICD-10-PCS | Mod: S$GLB,,, | Performed by: NURSE PRACTITIONER

## 2022-11-10 PROCEDURE — 99214 OFFICE O/P EST MOD 30 MIN: CPT | Mod: S$GLB,,, | Performed by: NURSE PRACTITIONER

## 2022-11-10 PROCEDURE — 1160F RVW MEDS BY RX/DR IN RCRD: CPT | Mod: CPTII,S$GLB,, | Performed by: NURSE PRACTITIONER

## 2022-11-10 RX ORDER — SEMAGLUTIDE 1.34 MG/ML
0.25 INJECTION, SOLUTION SUBCUTANEOUS
Qty: 1 PEN | Refills: 5 | Status: SHIPPED | OUTPATIENT
Start: 2022-11-10 | End: 2022-11-15 | Stop reason: SDUPTHER

## 2022-11-10 NOTE — PROGRESS NOTES
SUBJECTIVE:    Patient ID: Dayday Rodriguez is a 62 y.o. male.    Chief Complaint: Hypertension (No bottles, declined Flu vaccine, discuss about A1c and Metformin -hiccup side effect, abc )    62 year old male presents for check up. Treated for depression, cad, hypertension, hyperlipidemia. Recently started seeing dr. Juarez for shortness of breath. Started on trelegy. Seems to have helped. Scheduled for sleep study. Taking meds as prescribed. bp is well controlled in office today is elevated. Does not check bp routinely at home. Moods are stable. Reports that had labs done and hga1c was 8.5mg/dl. was started on metformin. However unable to tolerate due to hiccups      Lab Visit on 10/27/2022   Component Date Value Ref Range Status    WBC 10/27/2022 7.44  3.90 - 12.70 K/uL Final    RBC 10/27/2022 5.95  4.60 - 6.20 M/uL Final    Hemoglobin 10/27/2022 17.6  14.0 - 18.0 g/dL Final    Hematocrit 10/27/2022 50.8  40.0 - 54.0 % Final    MCV 10/27/2022 85  82 - 98 fL Final    MCH 10/27/2022 29.6  27.0 - 31.0 pg Final    MCHC 10/27/2022 34.6  32.0 - 36.0 g/dL Final    RDW 10/27/2022 12.9  11.5 - 14.5 % Final    Platelets 10/27/2022 207  150 - 450 K/uL Final    MPV 10/27/2022 9.7  9.2 - 12.9 fL Final    Immature Granulocytes 10/27/2022 0.3  0.0 - 0.5 % Final    Gran # (ANC) 10/27/2022 4.7  1.8 - 7.7 K/uL Final    Immature Grans (Abs) 10/27/2022 0.02  0.00 - 0.04 K/uL Final    Lymph # 10/27/2022 1.9  1.0 - 4.8 K/uL Final    Mono # 10/27/2022 0.8  0.3 - 1.0 K/uL Final    Eos # 10/27/2022 0.1  0.0 - 0.5 K/uL Final    Baso # 10/27/2022 0.05  0.00 - 0.20 K/uL Final    nRBC 10/27/2022 0  0 /100 WBC Final    Gran % 10/27/2022 62.7  38.0 - 73.0 % Final    Lymph % 10/27/2022 25.5  18.0 - 48.0 % Final    Mono % 10/27/2022 10.1  4.0 - 15.0 % Final    Eosinophil % 10/27/2022 0.7  0.0 - 8.0 % Final    Basophil % 10/27/2022 0.7  0.0 - 1.9 % Final    Differential Method 10/27/2022 Automated   Final    Hemoglobin A1C 10/27/2022 8.5 (H)  4.0  - 5.6 % Final    Estimated Avg Glucose 10/27/2022 197 (H)  68 - 131 mg/dL Final    Sodium 10/27/2022 136  136 - 145 mmol/L Final    Potassium 10/27/2022 4.4  3.5 - 5.1 mmol/L Final    Chloride 10/27/2022 97  95 - 110 mmol/L Final    CO2 10/27/2022 28  23 - 29 mmol/L Final    Glucose 10/27/2022 190 (H)  70 - 110 mg/dL Final    BUN 10/27/2022 17  8 - 23 mg/dL Final    Creatinine 10/27/2022 1.2  0.5 - 1.4 mg/dL Final    Calcium 10/27/2022 10.1  8.7 - 10.5 mg/dL Final    Total Protein 10/27/2022 7.6  6.0 - 8.4 g/dL Final    Albumin 10/27/2022 3.9  3.5 - 5.2 g/dL Final    Total Bilirubin 10/27/2022 0.9  0.1 - 1.0 mg/dL Final    Alkaline Phosphatase 10/27/2022 123  55 - 135 U/L Final    AST 10/27/2022 34  10 - 40 U/L Final    ALT 10/27/2022 50 (H)  10 - 44 U/L Final    Anion Gap 10/27/2022 11  8 - 16 mmol/L Final    eGFR 10/27/2022 >60  >60 mL/min/1.73 m^2 Final    Troponin I 10/27/2022 <0.006  0.000 - 0.026 ng/mL Final       Past Medical History:   Diagnosis Date    Coronary artery disease     Diabetes mellitus 07/1922    pt on metformin in past, states DM resolved so no longer taking    Hypertension     MI (myocardial infarction)      Social History     Socioeconomic History    Marital status: Significant Other   Tobacco Use    Smoking status: Former    Smokeless tobacco: Never   Substance and Sexual Activity    Alcohol use: Yes     Comment: Occasionally    Drug use: Never    Sexual activity: Yes     Partners: Female     Birth control/protection: Coitus interruptus     Past Surgical History:   Procedure Laterality Date    cardiac stents      x 7    SHOULDER SURGERY Right 1975     History reviewed. No pertinent family history.    Review of patient's allergies indicates:  No Known Allergies    Current Outpatient Medications:     aspirin (ECOTRIN) 81 MG EC tablet, Aspir-81 mg tablet,delayed release  Take 1 tablet every day by oral route., Disp: , Rfl:     azelastine (ASTELIN) 137 mcg (0.1 %) nasal spray, 2 sprays (274 mcg  total) by Nasal route 2 (two) times daily., Disp: 30 mL, Rfl: 11    fluticasone propionate (FLONASE) 50 mcg/actuation nasal spray, 1 spray (50 mcg total) by Each Nostril route once daily., Disp: 16 g, Rfl: 11    fluticasone-umeclidin-vilanter (TRELEGY ELLIPTA) 200-62.5-25 mcg inhaler, Inhale 1 puff into the lungs once daily., Disp: 60 each, Rfl: 11    losartan-hydrochlorothiazide 50-12.5 mg (HYZAAR) 50-12.5 mg per tablet, Take 1 tablet by mouth once daily., Disp: 180 tablet, Rfl: 1    metoprolol succinate (TOPROL-XL) 25 MG 24 hr tablet, Take 1 tablet (25 mg total) by mouth nightly., Disp: 90 tablet, Rfl: 1    rosuvastatin (CRESTOR) 40 MG Tab, Take 1 tablet (40 mg total) by mouth every evening., Disp: 90 tablet, Rfl: 1    albuterol (PROVENTIL/VENTOLIN HFA) 90 mcg/actuation inhaler, 2 puffs every 4 hours as needed for cough, wheeze, or shortness of breath, Disp: 18 g, Rfl: 11    buPROPion (WELLBUTRIN XL) 150 MG TB24 tablet, Take 1 tablet (150 mg total) by mouth 2 (two) times daily., Disp: 180 tablet, Rfl: 1    gabapentin (NEURONTIN) 300 MG capsule, Take 300 mg by mouth once daily., Disp: , Rfl:     montelukast (SINGULAIR) 10 mg tablet, Take 1 tablet (10 mg total) by mouth every evening., Disp: 30 tablet, Rfl: 11    semaglutide (OZEMPIC) 0.25 mg or 0.5 mg(2 mg/1.5 mL) pen injector, Inject 0.25 mg into the skin every 7 days., Disp: 1 pen, Rfl: 5    sildenafiL (VIAGRA) 100 MG tablet, Take 1 tablet (100 mg total) by mouth daily as needed for Erectile Dysfunction., Disp: 30 tablet, Rfl: 2    Review of Systems   Constitutional:  Negative for fatigue, fever and unexpected weight change.   HENT:  Negative for ear pain, sinus pressure and sore throat.    Eyes:  Negative for pain.   Respiratory:  Negative for cough and shortness of breath.    Cardiovascular:  Negative for chest pain and leg swelling.   Gastrointestinal:  Negative for abdominal pain, constipation, nausea and vomiting.   Genitourinary:  Negative for dysuria,  "frequency and urgency.   Musculoskeletal:  Negative for arthralgias.   Skin:  Negative for rash.   Neurological:  Negative for dizziness, weakness and headaches.   Psychiatric/Behavioral:  Negative for sleep disturbance.         Objective:      Vitals:    11/10/22 1252   BP: 130/82   Pulse: 72   Weight: (!) 142.4 kg (314 lb)   Height: 6' 4" (1.93 m)     Physical Exam  Vitals and nursing note reviewed.   Constitutional:       Appearance: Normal appearance. He is well-developed. He is obese.   HENT:      Head: Normocephalic and atraumatic.      Right Ear: External ear normal.      Left Ear: External ear normal.   Eyes:      Pupils: Pupils are equal, round, and reactive to light.   Neck:      Trachea: No tracheal deviation.   Cardiovascular:      Rate and Rhythm: Normal rate and regular rhythm.      Heart sounds: No murmur heard.    No friction rub. No gallop.   Pulmonary:      Breath sounds: Normal breath sounds. No stridor. No wheezing or rales.   Abdominal:      Palpations: Abdomen is soft. There is no mass.      Tenderness: There is no abdominal tenderness.   Musculoskeletal:         General: No tenderness or deformity.      Cervical back: Neck supple.   Lymphadenopathy:      Cervical: No cervical adenopathy.   Skin:     General: Skin is warm and dry.   Neurological:      Mental Status: He is alert and oriented to person, place, and time.      Coordination: Coordination normal.   Psychiatric:         Thought Content: Thought content normal.         Assessment:       1. Depressive disorder    2. Polycythemia    3. Hypertension, unspecified type    4. Coronary artery disease, unspecified vessel or lesion type, unspecified whether angina present, unspecified whether native or transplanted heart    5. Obesity, unspecified classification, unspecified obesity type, unspecified whether serious comorbidity present    6. High risk medication use    7. Hyperlipidemia, unspecified hyperlipidemia type    8. Obstructive sleep " apnea syndrome    9. Type 2 diabetes mellitus with hyperglycemia, without long-term current use of insulin         Plan:       Depressive disorder    Polycythemia    Hypertension, unspecified type    Coronary artery disease, unspecified vessel or lesion type, unspecified whether angina present, unspecified whether native or transplanted heart    Obesity, unspecified classification, unspecified obesity type, unspecified whether serious comorbidity present    High risk medication use    Hyperlipidemia, unspecified hyperlipidemia type    Obstructive sleep apnea syndrome    Type 2 diabetes mellitus with hyperglycemia, without long-term current use of insulin    Other orders  -     Discontinue: semaglutide (OZEMPIC) 0.25 mg or 0.5 mg(2 mg/1.5 mL) pen injector; Inject 0.25 mg into the skin every 7 days.  Dispense: 1 pen; Refill: 5      Follow up in about 3 months (around 2/10/2023) for medication management.        12/29/2022 Melissa Thurston

## 2022-11-11 ENCOUNTER — TELEPHONE (OUTPATIENT)
Dept: PULMONOLOGY | Facility: CLINIC | Age: 62
End: 2022-11-11
Payer: COMMERCIAL

## 2022-11-11 ENCOUNTER — TELEPHONE (OUTPATIENT)
Dept: FAMILY MEDICINE | Facility: CLINIC | Age: 62
End: 2022-11-11

## 2022-11-11 NOTE — TELEPHONE ENCOUNTER
----- Message from Nupur Babb sent at 11/11/2022 10:25 AM CST -----  Vm- [facility] is calling about ozempic being out of stock   [phone]

## 2022-11-11 NOTE — TELEPHONE ENCOUNTER
Unable to contact.     ----- Message from Nupur Lopez sent at 11/11/2022 11:13 AM CST -----  Regarding: UNABLE TO CONTACT  We have made 3 attempts to contact this pt to  schedule their echo and have been unable to reach them therefore we are removing this from our work queue. We just wanted to make you aware of this in case you wanted to reach out to the patient.     Thanks,   [facility] Centralized Scheduling

## 2022-11-15 ENCOUNTER — PATIENT MESSAGE (OUTPATIENT)
Dept: FAMILY MEDICINE | Facility: CLINIC | Age: 62
End: 2022-11-15

## 2022-11-15 RX ORDER — SEMAGLUTIDE 1.34 MG/ML
0.25 INJECTION, SOLUTION SUBCUTANEOUS
Qty: 1 PEN | Refills: 5 | Status: SHIPPED | OUTPATIENT
Start: 2022-11-15 | End: 2022-11-16 | Stop reason: SDUPTHER

## 2022-11-16 ENCOUNTER — PATIENT MESSAGE (OUTPATIENT)
Dept: FAMILY MEDICINE | Facility: CLINIC | Age: 62
End: 2022-11-16

## 2022-11-16 RX ORDER — SEMAGLUTIDE 1.34 MG/ML
0.25 INJECTION, SOLUTION SUBCUTANEOUS
Qty: 1 PEN | Refills: 5 | Status: SHIPPED | OUTPATIENT
Start: 2022-11-16 | End: 2023-04-13

## 2022-11-21 ENCOUNTER — HOSPITAL ENCOUNTER (OUTPATIENT)
Dept: CARDIOLOGY | Facility: HOSPITAL | Age: 62
Discharge: HOME | End: 2022-11-21
Attending: INTERNAL MEDICINE
Payer: COMMERCIAL

## 2022-11-21 ENCOUNTER — PATIENT MESSAGE (OUTPATIENT)
Dept: FAMILY MEDICINE | Facility: CLINIC | Age: 62
End: 2022-11-21

## 2022-11-21 DIAGNOSIS — R06.02 SOB (SHORTNESS OF BREATH): ICD-10-CM

## 2022-11-21 PROCEDURE — 93306 ECHO (CUPID ONLY): ICD-10-PCS | Mod: 26,,, | Performed by: SPECIALIST

## 2022-11-21 PROCEDURE — 93306 TTE W/DOPPLER COMPLETE: CPT

## 2022-11-21 PROCEDURE — 93306 TTE W/DOPPLER COMPLETE: CPT | Mod: 26,,, | Performed by: SPECIALIST

## 2022-11-22 LAB
AORTIC ROOT ANNULUS: 3.8 CM
AORTIC VALVE CUSP SEPERATION: 2.4 CM
AV INDEX (PROSTH): 0.79
AV MEAN GRADIENT: 4 MMHG
AV PEAK GRADIENT: 6 MMHG
AV VALVE AREA: 2.47 CM2
AV VELOCITY RATIO: 0.9
CV ECHO LV RWT: 0.57 CM
DOP CALC AO PEAK VEL: 1.25 M/S
DOP CALC AO VTI: 27.8 CM
DOP CALC LVOT AREA: 3.1 CM2
DOP CALC LVOT DIAMETER: 2 CM
DOP CALC LVOT PEAK VEL: 1.12 M/S
DOP CALC LVOT STROKE VOLUME: 68.77 CM3
DOP CALCLVOT PEAK VEL VTI: 21.9 CM
E WAVE DECELERATION TIME: 261 MSEC
E/A RATIO: 1.13
E/E' RATIO: 11.33 M/S
ECHO LV POSTERIOR WALL: 1.08 CM (ref 0.6–1.1)
EJECTION FRACTION: 59 %
FRACTIONAL SHORTENING: 34 % (ref 28–44)
INTERVENTRICULAR SEPTUM: 1.1 CM (ref 0.6–1.1)
IVRT: 134 MSEC
LEFT ATRIUM SIZE: 3.5 CM
LEFT INTERNAL DIMENSION IN SYSTOLE: 2.48 CM (ref 2.1–4)
LEFT VENTRICLE DIASTOLIC VOLUME: 60.8 ML
LEFT VENTRICLE SYSTOLIC VOLUME: 24.8 ML
LEFT VENTRICULAR INTERNAL DIMENSION IN DIASTOLE: 3.77 CM (ref 3.5–6)
LEFT VENTRICULAR MASS: 131.28 G
LV LATERAL E/E' RATIO: 9.44 M/S
LV SEPTAL E/E' RATIO: 14.17 M/S
LVOT MG: 3 MMHG
LVOT MV: 0.78 CM/S
MV PEAK A VEL: 0.75 M/S
MV PEAK E VEL: 0.85 M/S
MV STENOSIS PRESSURE HALF TIME: 74 MS
MV VALVE AREA P 1/2 METHOD: 2.97 CM2
PV MEAN GRADIENT: 2 MMHG
PV MV: 0.67 M/S
PV PEAK VELOCITY: 0.97 CM/S
RA PRESSURE: 3 MMHG
RIGHT VENTRICULAR END-DIASTOLIC DIMENSION: 2.33 CM
TDI LATERAL: 0.09 M/S
TDI SEPTAL: 0.06 M/S
TDI: 0.08 M/S

## 2022-11-28 ENCOUNTER — OFFICE VISIT (OUTPATIENT)
Dept: PULMONOLOGY | Facility: CLINIC | Age: 62
End: 2022-11-28
Payer: COMMERCIAL

## 2022-11-28 VITALS
SYSTOLIC BLOOD PRESSURE: 144 MMHG | DIASTOLIC BLOOD PRESSURE: 76 MMHG | OXYGEN SATURATION: 98 % | WEIGHT: 312.5 LBS | HEART RATE: 86 BPM | BODY MASS INDEX: 38.05 KG/M2 | HEIGHT: 76 IN

## 2022-11-28 DIAGNOSIS — R09.89 CHRONIC SINUS COMPLAINTS: ICD-10-CM

## 2022-11-28 DIAGNOSIS — R06.02 SOB (SHORTNESS OF BREATH): ICD-10-CM

## 2022-11-28 DIAGNOSIS — G47.33 OBSTRUCTIVE SLEEP APNEA SYNDROME: Primary | ICD-10-CM

## 2022-11-28 PROCEDURE — 3078F PR MOST RECENT DIASTOLIC BLOOD PRESSURE < 80 MM HG: ICD-10-PCS | Mod: CPTII,S$GLB,, | Performed by: INTERNAL MEDICINE

## 2022-11-28 PROCEDURE — 99214 PR OFFICE/OUTPT VISIT, EST, LEVL IV, 30-39 MIN: ICD-10-PCS | Mod: S$GLB,,, | Performed by: INTERNAL MEDICINE

## 2022-11-28 PROCEDURE — 3066F NEPHROPATHY DOC TX: CPT | Mod: CPTII,S$GLB,, | Performed by: INTERNAL MEDICINE

## 2022-11-28 PROCEDURE — 3061F NEG MICROALBUMINURIA REV: CPT | Mod: CPTII,S$GLB,, | Performed by: INTERNAL MEDICINE

## 2022-11-28 PROCEDURE — 3008F BODY MASS INDEX DOCD: CPT | Mod: CPTII,S$GLB,, | Performed by: INTERNAL MEDICINE

## 2022-11-28 PROCEDURE — 3008F PR BODY MASS INDEX (BMI) DOCUMENTED: ICD-10-PCS | Mod: CPTII,S$GLB,, | Performed by: INTERNAL MEDICINE

## 2022-11-28 PROCEDURE — 1159F PR MEDICATION LIST DOCUMENTED IN MEDICAL RECORD: ICD-10-PCS | Mod: CPTII,S$GLB,, | Performed by: INTERNAL MEDICINE

## 2022-11-28 PROCEDURE — 3077F SYST BP >= 140 MM HG: CPT | Mod: CPTII,S$GLB,, | Performed by: INTERNAL MEDICINE

## 2022-11-28 PROCEDURE — 3066F PR DOCUMENTATION OF TREATMENT FOR NEPHROPATHY: ICD-10-PCS | Mod: CPTII,S$GLB,, | Performed by: INTERNAL MEDICINE

## 2022-11-28 PROCEDURE — 3052F HG A1C>EQUAL 8.0%<EQUAL 9.0%: CPT | Mod: CPTII,S$GLB,, | Performed by: INTERNAL MEDICINE

## 2022-11-28 PROCEDURE — 1159F MED LIST DOCD IN RCRD: CPT | Mod: CPTII,S$GLB,, | Performed by: INTERNAL MEDICINE

## 2022-11-28 PROCEDURE — 99999 PR PBB SHADOW E&M-EST. PATIENT-LVL IV: ICD-10-PCS | Mod: PBBFAC,,, | Performed by: INTERNAL MEDICINE

## 2022-11-28 PROCEDURE — 99214 OFFICE O/P EST MOD 30 MIN: CPT | Mod: S$GLB,,, | Performed by: INTERNAL MEDICINE

## 2022-11-28 PROCEDURE — 99999 PR PBB SHADOW E&M-EST. PATIENT-LVL IV: CPT | Mod: PBBFAC,,, | Performed by: INTERNAL MEDICINE

## 2022-11-28 PROCEDURE — 3077F PR MOST RECENT SYSTOLIC BLOOD PRESSURE >= 140 MM HG: ICD-10-PCS | Mod: CPTII,S$GLB,, | Performed by: INTERNAL MEDICINE

## 2022-11-28 PROCEDURE — 3052F PR MOST RECENT HEMOGLOBIN A1C LEVEL 8.0 - < 9.0%: ICD-10-PCS | Mod: CPTII,S$GLB,, | Performed by: INTERNAL MEDICINE

## 2022-11-28 PROCEDURE — 3061F PR NEG MICROALBUMINURIA RESULT DOCUMENTED/REVIEW: ICD-10-PCS | Mod: CPTII,S$GLB,, | Performed by: INTERNAL MEDICINE

## 2022-11-28 PROCEDURE — 3078F DIAST BP <80 MM HG: CPT | Mod: CPTII,S$GLB,, | Performed by: INTERNAL MEDICINE

## 2022-11-28 RX ORDER — ALBUTEROL SULFATE 90 UG/1
AEROSOL, METERED RESPIRATORY (INHALATION)
Qty: 18 G | Refills: 11 | Status: SHIPPED | OUTPATIENT
Start: 2022-11-28 | End: 2023-11-29 | Stop reason: SDUPTHER

## 2022-11-28 RX ORDER — ALBUTEROL SULFATE 90 UG/1
AEROSOL, METERED RESPIRATORY (INHALATION)
Qty: 18 G | Refills: 11 | Status: SHIPPED | OUTPATIENT
Start: 2022-11-28 | End: 2022-11-28 | Stop reason: SDUPTHER

## 2022-11-28 RX ORDER — MONTELUKAST SODIUM 10 MG/1
10 TABLET ORAL NIGHTLY
Qty: 30 TABLET | Refills: 11 | Status: SHIPPED | OUTPATIENT
Start: 2022-11-28 | End: 2023-12-27 | Stop reason: ALTCHOICE

## 2022-11-28 RX ORDER — MONTELUKAST SODIUM 10 MG/1
10 TABLET ORAL NIGHTLY
Qty: 30 TABLET | Refills: 11 | Status: SHIPPED | OUTPATIENT
Start: 2022-11-28 | End: 2022-11-28 | Stop reason: SDUPTHER
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
## 2022-11-28 NOTE — PATIENT INSTRUCTIONS
You have benefited from trelegy-- faint wheezes noted.  Use albuterol as needed if cough or wheezes or short breath.    With  min wheezes  suspected now-- check lung capacity    Sleep apnea is found with 23 episodes an hour.  Will order auto cpap-- will need follow up compliance report.  Will recommend airfit n 30i,  could use airfit p 30i.   Need proper frame.    May need chin strap         May need sedative if able to tolerate cpap.     Would try singulair.

## 2022-11-29 ENCOUNTER — TELEPHONE (OUTPATIENT)
Dept: PULMONOLOGY | Facility: CLINIC | Age: 62
End: 2022-11-29
Payer: COMMERCIAL

## 2023-01-04 ENCOUNTER — TELEPHONE (OUTPATIENT)
Dept: PULMONOLOGY | Facility: CLINIC | Age: 63
End: 2023-01-04
Payer: COMMERCIAL

## 2023-02-10 ENCOUNTER — TELEPHONE (OUTPATIENT)
Dept: FAMILY MEDICINE | Facility: CLINIC | Age: 63
End: 2023-02-10

## 2023-02-10 DIAGNOSIS — E78.5 HYPERLIPIDEMIA, UNSPECIFIED HYPERLIPIDEMIA TYPE: ICD-10-CM

## 2023-02-10 DIAGNOSIS — Z79.899 HIGH RISK MEDICATION USE: ICD-10-CM

## 2023-02-10 DIAGNOSIS — E11.65 TYPE 2 DIABETES MELLITUS WITH HYPERGLYCEMIA, WITHOUT LONG-TERM CURRENT USE OF INSULIN: ICD-10-CM

## 2023-02-10 DIAGNOSIS — I10 HYPERTENSION, UNSPECIFIED TYPE: ICD-10-CM

## 2023-02-10 DIAGNOSIS — Z79.899 ENCOUNTER FOR LONG-TERM (CURRENT) USE OF OTHER MEDICATIONS: Primary | ICD-10-CM

## 2023-02-28 ENCOUNTER — TELEPHONE (OUTPATIENT)
Dept: PULMONOLOGY | Facility: CLINIC | Age: 63
End: 2023-02-28
Payer: COMMERCIAL

## 2023-03-01 ENCOUNTER — OFFICE VISIT (OUTPATIENT)
Dept: PULMONOLOGY | Facility: CLINIC | Age: 63
End: 2023-03-01
Payer: COMMERCIAL

## 2023-03-01 ENCOUNTER — HOSPITAL ENCOUNTER (OUTPATIENT)
Dept: PULMONOLOGY | Facility: HOSPITAL | Age: 63
Discharge: HOME | End: 2023-03-01
Attending: INTERNAL MEDICINE
Payer: COMMERCIAL

## 2023-03-01 VITALS
WEIGHT: 308.31 LBS | HEIGHT: 76 IN | OXYGEN SATURATION: 94 % | HEART RATE: 81 BPM | BODY MASS INDEX: 37.54 KG/M2 | DIASTOLIC BLOOD PRESSURE: 78 MMHG | SYSTOLIC BLOOD PRESSURE: 122 MMHG

## 2023-03-01 DIAGNOSIS — R06.02 SOB (SHORTNESS OF BREATH): ICD-10-CM

## 2023-03-01 DIAGNOSIS — R73.03 PREDIABETES: ICD-10-CM

## 2023-03-01 DIAGNOSIS — J45.50 SEVERE PERSISTENT ASTHMA WITHOUT COMPLICATION: ICD-10-CM

## 2023-03-01 DIAGNOSIS — R09.89 CHRONIC SINUS COMPLAINTS: Primary | ICD-10-CM

## 2023-03-01 DIAGNOSIS — G47.33 OBSTRUCTIVE SLEEP APNEA SYNDROME: ICD-10-CM

## 2023-03-01 DIAGNOSIS — D75.1 POLYCYTHEMIA: ICD-10-CM

## 2023-03-01 PROCEDURE — 3008F BODY MASS INDEX DOCD: CPT | Mod: CPTII,S$GLB,, | Performed by: INTERNAL MEDICINE

## 2023-03-01 PROCEDURE — 94060 EVALUATION OF WHEEZING: CPT | Mod: 26,,, | Performed by: INTERNAL MEDICINE

## 2023-03-01 PROCEDURE — 94726 PLETHYSMOGRAPHY LUNG VOLUMES: CPT | Mod: 26,,, | Performed by: INTERNAL MEDICINE

## 2023-03-01 PROCEDURE — 1159F MED LIST DOCD IN RCRD: CPT | Mod: CPTII,S$GLB,, | Performed by: INTERNAL MEDICINE

## 2023-03-01 PROCEDURE — 94726 PLETHYSMOGRAPHY LUNG VOLUMES: CPT

## 2023-03-01 PROCEDURE — 99999 PR PBB SHADOW E&M-EST. PATIENT-LVL IV: ICD-10-PCS | Mod: PBBFAC,,, | Performed by: INTERNAL MEDICINE

## 2023-03-01 PROCEDURE — 3008F PR BODY MASS INDEX (BMI) DOCUMENTED: ICD-10-PCS | Mod: CPTII,S$GLB,, | Performed by: INTERNAL MEDICINE

## 2023-03-01 PROCEDURE — 1159F PR MEDICATION LIST DOCUMENTED IN MEDICAL RECORD: ICD-10-PCS | Mod: CPTII,S$GLB,, | Performed by: INTERNAL MEDICINE

## 2023-03-01 PROCEDURE — 99999 PR PBB SHADOW E&M-EST. PATIENT-LVL IV: CPT | Mod: PBBFAC,,, | Performed by: INTERNAL MEDICINE

## 2023-03-01 PROCEDURE — 94060 PR EVAL OF BRONCHOSPASM: ICD-10-PCS | Mod: 26,,, | Performed by: INTERNAL MEDICINE

## 2023-03-01 PROCEDURE — 3078F DIAST BP <80 MM HG: CPT | Mod: CPTII,S$GLB,, | Performed by: INTERNAL MEDICINE

## 2023-03-01 PROCEDURE — 99214 OFFICE O/P EST MOD 30 MIN: CPT | Mod: 25,S$GLB,, | Performed by: INTERNAL MEDICINE

## 2023-03-01 PROCEDURE — 94726 PULM FUNCT TST PLETHYSMOGRAP: ICD-10-PCS | Mod: 26,,, | Performed by: INTERNAL MEDICINE

## 2023-03-01 PROCEDURE — 94729 DIFFUSING CAPACITY: CPT | Mod: 26,,, | Performed by: INTERNAL MEDICINE

## 2023-03-01 PROCEDURE — 3074F PR MOST RECENT SYSTOLIC BLOOD PRESSURE < 130 MM HG: ICD-10-PCS | Mod: CPTII,S$GLB,, | Performed by: INTERNAL MEDICINE

## 2023-03-01 PROCEDURE — 3074F SYST BP LT 130 MM HG: CPT | Mod: CPTII,S$GLB,, | Performed by: INTERNAL MEDICINE

## 2023-03-01 PROCEDURE — 94729 PR C02/MEMBANE DIFFUSE CAPACITY: ICD-10-PCS | Mod: 26,,, | Performed by: INTERNAL MEDICINE

## 2023-03-01 PROCEDURE — 99214 PR OFFICE/OUTPT VISIT, EST, LEVL IV, 30-39 MIN: ICD-10-PCS | Mod: 25,S$GLB,, | Performed by: INTERNAL MEDICINE

## 2023-03-01 PROCEDURE — 94060 EVALUATION OF WHEEZING: CPT

## 2023-03-01 PROCEDURE — 3078F PR MOST RECENT DIASTOLIC BLOOD PRESSURE < 80 MM HG: ICD-10-PCS | Mod: CPTII,S$GLB,, | Performed by: INTERNAL MEDICINE

## 2023-03-01 PROCEDURE — 94729 DIFFUSING CAPACITY: CPT

## 2023-03-01 RX ORDER — PREDNISONE 20 MG/1
TABLET ORAL
Qty: 12 TABLET | Refills: 2 | Status: SHIPPED | OUTPATIENT
Start: 2023-03-01 | End: 2023-12-27 | Stop reason: SDUPTHER

## 2023-03-01 RX ORDER — ALBUTEROL SULFATE 2.5 MG/.5ML
2.5 SOLUTION RESPIRATORY (INHALATION)
Status: DISCONTINUED | OUTPATIENT
Start: 2023-03-01 | End: 2024-01-09

## 2023-03-01 NOTE — PATIENT INSTRUCTIONS
May skip Singulair for weeks , resume if sinuses or lung worsen-- stop if not effective.        Use albuterol 2 puffs as needed, could use 4 puff if bad spell.    Use prednisone if albuterol not working nor lasting.        Change nasal cradle to pillars    Breathing test was good -- normal range -- good    Working diagnosis is asthma with no wheezes (had wheezes on prior exam)
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
## 2023-03-02 LAB
BRPFT: ABNORMAL
DLCO SINGLE BREATH LLN: 26.89
DLCO SINGLE BREATH PRE REF: 85.8 %
DLCO SINGLE BREATH REF: 33.82
DLCOC SBVA LLN: 3.05
DLCOC SBVA REF: 4.05
DLCOC SINGLE BREATH LLN: 26.89
DLCOC SINGLE BREATH REF: 33.82
DLCOVA LLN: 3.05
DLCOVA PRE REF: 113.9 %
DLCOVA PRE: 4.61 ML/(MIN*MMHG*L) (ref 3.05–5.06)
DLCOVA REF: 4.05
ERV LLN: -16448.68
ERV PRE REF: 136.9 %
ERV REF: 1.32
FEF 25 75 CHG: 17.6 %
FEF 25 75 LLN: 1.57
FEF 25 75 POST REF: 162.3 %
FEF 25 75 PRE REF: 138 %
FEF 25 75 REF: 3.28
FET100 CHG: 13.8 %
FEV1 CHG: -0.8 %
FEV1 FVC CHG: 2.8 %
FEV1 FVC LLN: 64
FEV1 FVC POST REF: 113.3 %
FEV1 FVC PRE REF: 110.2 %
FEV1 FVC REF: 76
FEV1 LLN: 3.14
FEV1 POST REF: 91.7 %
FEV1 PRE REF: 92.5 %
FEV1 REF: 4.2
FRCPLETH LLN: 3
FRCPLETH PREREF: 94.5 %
FRCPLETH REF: 3.99
FVC CHG: -3.5 %
FVC LLN: 4.21
FVC POST REF: 80.5 %
FVC PRE REF: 83.4 %
FVC REF: 5.55
IVC PRE: 4.35 L (ref 4.21–6.91)
IVC SINGLE BREATH LLN: 4.21
IVC SINGLE BREATH PRE REF: 78.4 %
IVC SINGLE BREATH REF: 5.55
MVV LLN: 136
MVV PRE REF: 91.4 %
MVV REF: 161
PEF CHG: 47.4 %
PEF LLN: 7.75
PEF POST REF: 90.3 %
PEF PRE REF: 61.3 %
PEF REF: 10.48
POST FEF 25 75: 5.32 L/S (ref 1.57–5.61)
POST FET 100: 8.74 SEC
POST FEV1 FVC: 86.26 % (ref 63.96–86.73)
POST FEV1: 3.85 L (ref 3.14–5.2)
POST FVC: 4.47 L (ref 4.21–6.91)
POST PEF: 9.47 L/S (ref 7.75–13.21)
PRE DLCO: 29.01 ML/(MIN*MMHG) (ref 26.89–40.74)
PRE ERV: 1.81 L (ref -16448.68–16451.32)
PRE FEF 25 75: 4.52 L/S (ref 1.57–5.61)
PRE FET 100: 7.68 SEC
PRE FEV1 FVC: 83.91 % (ref 63.96–86.73)
PRE FEV1: 3.89 L (ref 3.14–5.2)
PRE FRC PL: 3.77 L (ref 3–4.97)
PRE FVC: 4.63 L (ref 4.21–6.91)
PRE MVV: 146.78 L/MIN (ref 136.43–184.59)
PRE PEF: 6.42 L/S (ref 7.75–13.21)
PRE RV: 1.95 L (ref 1.99–3.34)
PRE TLC: 6.58 L (ref 7.19–9.5)
RAW LLN: 3.06
RAW PRE REF: 51.2 %
RAW PRE: 1.57 CMH2O*S/L (ref 3.06–3.06)
RAW REF: 3.06
RV LLN: 1.99
RV PRE REF: 73.4 %
RV REF: 2.66
RVTLC LLN: 29
RVTLC PRE REF: 77.8 %
RVTLC PRE: 29.68 % (ref 29.16–47.12)
RVTLC REF: 38
TLC LLN: 7.19
TLC PRE REF: 78.9 %
TLC REF: 8.34
VA PRE: 6.29 L (ref 8.19–8.19)
VA SINGLE BREATH LLN: 8.19
VA SINGLE BREATH PRE REF: 76.7 %
VA SINGLE BREATH REF: 8.19
VC LLN: 4.21
VC PRE REF: 83.4 %
VC PRE: 4.63 L (ref 4.21–6.91)
VC REF: 5.55

## 2023-04-11 ENCOUNTER — PATIENT MESSAGE (OUTPATIENT)
Dept: ADMINISTRATIVE | Facility: HOSPITAL | Age: 63
End: 2023-04-11
Payer: COMMERCIAL

## 2023-04-12 ENCOUNTER — PATIENT MESSAGE (OUTPATIENT)
Dept: FAMILY MEDICINE | Facility: CLINIC | Age: 63
End: 2023-04-12

## 2023-04-13 ENCOUNTER — PATIENT MESSAGE (OUTPATIENT)
Dept: FAMILY MEDICINE | Facility: CLINIC | Age: 63
End: 2023-04-13

## 2023-04-13 RX ORDER — SEMAGLUTIDE 1.34 MG/ML
0.25 INJECTION, SOLUTION SUBCUTANEOUS
Qty: 3 ML | Refills: 5 | Status: SHIPPED | OUTPATIENT
Start: 2023-04-13 | End: 2023-05-29

## 2023-05-26 ENCOUNTER — PATIENT MESSAGE (OUTPATIENT)
Dept: FAMILY MEDICINE | Facility: CLINIC | Age: 63
End: 2023-05-26

## 2023-05-29 RX ORDER — SEMAGLUTIDE 0.68 MG/ML
0.25 INJECTION, SOLUTION SUBCUTANEOUS
Qty: 3 ML | Refills: 5 | Status: SHIPPED | OUTPATIENT
Start: 2023-05-29 | End: 2023-06-19 | Stop reason: SDUPTHER

## 2023-05-29 NOTE — TELEPHONE ENCOUNTER
Rx was sent back in April before the correct pen size was updated in epic. Rx set back up to be resent.

## 2023-06-11 DIAGNOSIS — E78.5 HYPERLIPIDEMIA, UNSPECIFIED HYPERLIPIDEMIA TYPE: ICD-10-CM

## 2023-06-11 DIAGNOSIS — F32.A DEPRESSIVE DISORDER: ICD-10-CM

## 2023-06-12 NOTE — TELEPHONE ENCOUNTER
"Per Rosibel "When they are due for a visit, just set up for 30 days until we can get them scheduled." I sent patient a portal message trying to get him scheduled as well as I left a voicemail trying to get him scheduled.   "

## 2023-06-14 RX ORDER — BUPROPION HYDROCHLORIDE 150 MG/1
150 TABLET ORAL 2 TIMES DAILY
Qty: 60 TABLET | Refills: 0 | Status: SHIPPED | OUTPATIENT
Start: 2023-06-14 | End: 2023-07-24 | Stop reason: SDUPTHER

## 2023-06-14 RX ORDER — ROSUVASTATIN CALCIUM 40 MG/1
40 TABLET, COATED ORAL NIGHTLY
Qty: 30 TABLET | Refills: 0 | Status: SHIPPED | OUTPATIENT
Start: 2023-06-14 | End: 2023-07-24 | Stop reason: SDUPTHER

## 2023-06-15 ENCOUNTER — PATIENT MESSAGE (OUTPATIENT)
Dept: FAMILY MEDICINE | Facility: CLINIC | Age: 63
End: 2023-06-15

## 2023-06-19 ENCOUNTER — PATIENT MESSAGE (OUTPATIENT)
Dept: FAMILY MEDICINE | Facility: CLINIC | Age: 63
End: 2023-06-19

## 2023-06-19 RX ORDER — SEMAGLUTIDE 0.68 MG/ML
0.25 INJECTION, SOLUTION SUBCUTANEOUS
Qty: 3 ML | Refills: 5 | Status: SHIPPED | OUTPATIENT
Start: 2023-06-19 | End: 2023-07-14 | Stop reason: SDUPTHER

## 2023-06-21 ENCOUNTER — TELEPHONE (OUTPATIENT)
Dept: FAMILY MEDICINE | Facility: CLINIC | Age: 63
End: 2023-06-21

## 2023-07-05 ENCOUNTER — PATIENT OUTREACH (OUTPATIENT)
Dept: ADMINISTRATIVE | Facility: HOSPITAL | Age: 63
End: 2023-07-05
Payer: COMMERCIAL

## 2023-07-05 ENCOUNTER — PATIENT MESSAGE (OUTPATIENT)
Dept: ADMINISTRATIVE | Facility: HOSPITAL | Age: 63
End: 2023-07-05
Payer: COMMERCIAL

## 2023-07-05 NOTE — PROGRESS NOTES
Routine Dilated Eye Exam  (Diabetic Retinopathy screening)     Non-compliant report chart audits for Eye Exam for Patients with Diabetes     Outreach to patient in reference to a routine Eye Exam    LEFT MESSAGE FOR PATIENT TO RETURN CALL,  PORTAL MSG SENT

## 2023-07-10 ENCOUNTER — OFFICE VISIT (OUTPATIENT)
Dept: FAMILY MEDICINE | Facility: CLINIC | Age: 63
End: 2023-07-10
Payer: COMMERCIAL

## 2023-07-10 VITALS
HEIGHT: 76 IN | DIASTOLIC BLOOD PRESSURE: 70 MMHG | BODY MASS INDEX: 37.1 KG/M2 | OXYGEN SATURATION: 98 % | HEART RATE: 65 BPM | SYSTOLIC BLOOD PRESSURE: 124 MMHG | WEIGHT: 304.63 LBS

## 2023-07-10 DIAGNOSIS — I10 HYPERTENSION, UNSPECIFIED TYPE: ICD-10-CM

## 2023-07-10 DIAGNOSIS — I25.10 CORONARY ARTERY DISEASE, UNSPECIFIED VESSEL OR LESION TYPE, UNSPECIFIED WHETHER ANGINA PRESENT, UNSPECIFIED WHETHER NATIVE OR TRANSPLANTED HEART: ICD-10-CM

## 2023-07-10 DIAGNOSIS — E11.65 TYPE 2 DIABETES MELLITUS WITH HYPERGLYCEMIA, WITHOUT LONG-TERM CURRENT USE OF INSULIN: Primary | ICD-10-CM

## 2023-07-10 DIAGNOSIS — E78.5 HYPERLIPIDEMIA, UNSPECIFIED HYPERLIPIDEMIA TYPE: ICD-10-CM

## 2023-07-10 DIAGNOSIS — F32.A DEPRESSIVE DISORDER: ICD-10-CM

## 2023-07-10 DIAGNOSIS — G47.33 OBSTRUCTIVE SLEEP APNEA SYNDROME: ICD-10-CM

## 2023-07-10 DIAGNOSIS — E66.9 OBESITY, UNSPECIFIED CLASSIFICATION, UNSPECIFIED OBESITY TYPE, UNSPECIFIED WHETHER SERIOUS COMORBIDITY PRESENT: ICD-10-CM

## 2023-07-10 LAB — HBA1C MFR BLD: 6.2 %

## 2023-07-10 PROCEDURE — 83036 HEMOGLOBIN GLYCOSYLATED A1C: CPT | Mod: QW,,, | Performed by: NURSE PRACTITIONER

## 2023-07-10 PROCEDURE — 1159F PR MEDICATION LIST DOCUMENTED IN MEDICAL RECORD: ICD-10-PCS | Mod: CPTII,S$GLB,, | Performed by: NURSE PRACTITIONER

## 2023-07-10 PROCEDURE — 3044F HG A1C LEVEL LT 7.0%: CPT | Mod: CPTII,S$GLB,, | Performed by: NURSE PRACTITIONER

## 2023-07-10 PROCEDURE — 83036 POCT HEMOGLOBIN A1C: ICD-10-PCS | Mod: QW,,, | Performed by: NURSE PRACTITIONER

## 2023-07-10 PROCEDURE — 1160F PR REVIEW ALL MEDS BY PRESCRIBER/CLIN PHARMACIST DOCUMENTED: ICD-10-PCS | Mod: CPTII,S$GLB,, | Performed by: NURSE PRACTITIONER

## 2023-07-10 PROCEDURE — 3078F PR MOST RECENT DIASTOLIC BLOOD PRESSURE < 80 MM HG: ICD-10-PCS | Mod: CPTII,S$GLB,, | Performed by: NURSE PRACTITIONER

## 2023-07-10 PROCEDURE — 1160F RVW MEDS BY RX/DR IN RCRD: CPT | Mod: CPTII,S$GLB,, | Performed by: NURSE PRACTITIONER

## 2023-07-10 PROCEDURE — 99214 PR OFFICE/OUTPT VISIT, EST, LEVL IV, 30-39 MIN: ICD-10-PCS | Mod: S$GLB,,, | Performed by: NURSE PRACTITIONER

## 2023-07-10 PROCEDURE — 1159F MED LIST DOCD IN RCRD: CPT | Mod: CPTII,S$GLB,, | Performed by: NURSE PRACTITIONER

## 2023-07-10 PROCEDURE — 3008F BODY MASS INDEX DOCD: CPT | Mod: CPTII,S$GLB,, | Performed by: NURSE PRACTITIONER

## 2023-07-10 PROCEDURE — 3044F PR MOST RECENT HEMOGLOBIN A1C LEVEL <7.0%: ICD-10-PCS | Mod: CPTII,S$GLB,, | Performed by: NURSE PRACTITIONER

## 2023-07-10 PROCEDURE — 3074F SYST BP LT 130 MM HG: CPT | Mod: CPTII,S$GLB,, | Performed by: NURSE PRACTITIONER

## 2023-07-10 PROCEDURE — 3008F PR BODY MASS INDEX (BMI) DOCUMENTED: ICD-10-PCS | Mod: CPTII,S$GLB,, | Performed by: NURSE PRACTITIONER

## 2023-07-10 PROCEDURE — 3074F PR MOST RECENT SYSTOLIC BLOOD PRESSURE < 130 MM HG: ICD-10-PCS | Mod: CPTII,S$GLB,, | Performed by: NURSE PRACTITIONER

## 2023-07-10 PROCEDURE — 99214 OFFICE O/P EST MOD 30 MIN: CPT | Mod: S$GLB,,, | Performed by: NURSE PRACTITIONER

## 2023-07-10 PROCEDURE — 3078F DIAST BP <80 MM HG: CPT | Mod: CPTII,S$GLB,, | Performed by: NURSE PRACTITIONER

## 2023-07-10 NOTE — PROGRESS NOTES
SUBJECTIVE:    Patient ID: Dayday Rodriguez is a 62 y.o. male.    Chief Complaint: Diabetes (No bottles//Pt is here for a check up and medication refills//eye exam done about 1 week ago, Dr Hernandez//Foot exam ordered today//decline colo//JONH )    62 year old male presents for check up. Treated for depression, cad, hypertension, dm, hyperlipidemia.  seeing dr. Juarez regularly. Started on trelegy. This has made significant difference in sob. No longer experiencing. Seems to have helped. Using cpap nightly.  bp is well controlled in office is well controlled. Does not check bp routinely at home. Moods are stable.  hga1c was 6.2mg/dl. this is down from 8.5. started ozempic. Feeling good since starting. Has lost 10lbs as well      Office Visit on 07/10/2023   Component Date Value Ref Range Status    Hemoglobin A1C, POC 07/10/2023 6.2  % Final   Hospital Outpatient Visit on 03/01/2023   Component Date Value Ref Range Status    Interpretation 03/02/2023    Final                    Value:Spirometry bronchodilator, lung volume by body box, and diffusion capacity were measured March 1, 2023. Spirometry is within normal limits. There was no bronchodilator response. Lung volumes by body box showed total lung capacity be 79% of predicted-a   little low. Patient had normal diffusion at 86% of predicted.  Â   Patient's BMI is 38 & total lung capacity was 79% predicted with 80% being lower normal. Patient has restriction measured on pulmonary functions. Clinical correlation would be recommended. Otherwise study falls within normal range.  & total lung capacity was 79% predicted with 80% being lower normal. Patient has restriction measured on pulmonary functions. Clinical correlation would be recommended. Otherwise study falls within normal range.      Post FVC 03/02/2023 4.47  4.21 - 6.91 L Final    Post FEV1 03/02/2023 3.85  3.14 - 5.20 L Final    Post FEV1 FVC 03/02/2023 86.26  63.96 - 86.73 % Final    Post FEF 25 75 03/02/2023  5.32  1.57 - 5.61 L/s Final    Post PEF 03/02/2023 9.47  7.75 - 13.21 L/s Final    Post  03/02/2023 8.74  sec Final    Pre DLCO 03/02/2023 29.01  26.89 - 40.74 ml/(min*mmHg) Final    DLCOVA PRE 03/02/2023 4.61  3.05 - 5.06 ml/(min*mmHg*L) Final    VA PRE 03/02/2023 6.29 (L)  8.19 - 8.19 L Final    IVC PRE 03/02/2023 4.35  4.21 - 6.91 L Final    Pre TLC 03/02/2023 6.58 (L)  7.19 - 9.50 L Final    VC PRE 03/02/2023 4.63  4.21 - 6.91 L Final    Pre FRC PL 03/02/2023 3.77  3.00 - 4.97 L Final    Pre ERV 03/02/2023 1.81  -62763.68 - 04723.32 L Final    Pre RV 03/02/2023 1.95 (L)  1.99 - 3.34 L Final    RVTLC PRE 03/02/2023 29.68  29.16 - 47.12 % Final    Raw PRE 03/02/2023 1.57 (L)  3.06 - 3.06 cmH2O*s/L Final    Pre FVC 03/02/2023 4.63  4.21 - 6.91 L Final    Pre FEV1 03/02/2023 3.89  3.14 - 5.20 L Final    Pre FEV1 FVC 03/02/2023 83.91  63.96 - 86.73 % Final    Pre FEF 25 75 03/02/2023 4.52  1.57 - 5.61 L/s Final    Pre PEF 03/02/2023 6.42 (L)  7.75 - 13.21 L/s Final    Pre  03/02/2023 7.68  sec Final    Pre MVV 03/02/2023 146.78  136.43 - 184.59 L/min Final    FVC Ref 03/02/2023 5.55   Final    FVC LLN 03/02/2023 4.21   Final    FVC Pre Ref 03/02/2023 83.4  % Final    FVC Post Ref 03/02/2023 80.5  % Final    FVC Chg 03/02/2023 -3.5  % Final    FEV1 Ref 03/02/2023 4.20   Final    FEV1 LLN 03/02/2023 3.14   Final    FEV1 Pre Ref 03/02/2023 92.5  % Final    FEV1 Post Ref 03/02/2023 91.7  % Final    FEV1 Chg 03/02/2023 -0.8  % Final    FEV1 FVC Ref 03/02/2023 76   Final    FEV1 FVC LLN 03/02/2023 64   Final    FEV1 FVC Pre Ref 03/02/2023 110.2  % Final    FEV1 FVC Post Ref 03/02/2023 113.3  % Final    FEV1 FVC Chg 03/02/2023 2.8  % Final    FEF 25 75 Ref 03/02/2023 3.28   Final    FEF 25 75 LLN 03/02/2023 1.57   Final    FEF 25 75 Pre Ref 03/02/2023 138.0  % Final    FEF 25 75 Post Ref 03/02/2023 162.3  % Final    FEF 25 75 Chg 03/02/2023 17.6  % Final    PEF Ref 03/02/2023 10.48   Final    PEF LLN  03/02/2023 7.75   Final    PEF Pre Ref 03/02/2023 61.3  % Final    PEF Post Ref 03/02/2023 90.3  % Final    PEF Chg 03/02/2023 47.4  % Final    DOP375 Chg 03/02/2023 13.8  % Final    MVV Ref 03/02/2023 161   Final    MVV LLN 03/02/2023 136   Final    MVV Pre Ref 03/02/2023 91.4  % Final    TLC Ref 03/02/2023 8.34   Final    TLC LLN 03/02/2023 7.19   Final    TLC Pre Ref 03/02/2023 78.9  % Final    VC Ref 03/02/2023 5.55   Final    VC LLN 03/02/2023 4.21   Final    VC Pre Ref 03/02/2023 83.4  % Final    FRCpleth Ref 03/02/2023 3.99   Final    FRCpleth LLN 03/02/2023 3.00   Final    FRCpleth PreRef 03/02/2023 94.5  % Final    ERV Ref 03/02/2023 1.32   Final    ERV LLN 03/02/2023 -95850.68   Final    ERV Pre Ref 03/02/2023 136.9  % Final    RV Ref 03/02/2023 2.66   Final    RV LLN 03/02/2023 1.99   Final    RV Pre Ref 03/02/2023 73.4  % Final    RVTLC Ref 03/02/2023 38   Final    RVTLC LLN 03/02/2023 29   Final    RVTLC Pre Ref 03/02/2023 77.8  % Final    Raw Ref 03/02/2023 3.06   Final    Raw LLN 03/02/2023 3.06   Final    Raw Pre Ref 03/02/2023 51.2  % Final    DLCO Single Breath Ref 03/02/2023 33.82   Final    DLCO Single Breath LLN 03/02/2023 26.89   Final    DLCO Single Breath Pre Ref 03/02/2023 85.8  % Final    DLCOc Single Breath Ref 03/02/2023 33.82   Final    DLCOc Single Breath LLN 03/02/2023 26.89   Final    DLCOVA Ref 03/02/2023 4.05   Final    DLCOVA LLN 03/02/2023 3.05   Final    DLCOVA Pre Ref 03/02/2023 113.9  % Final    DLCOc SBVA Ref 03/02/2023 4.05   Final    DLCOc SBVA LLN 03/02/2023 3.05   Final    VA Single Breath Ref 03/02/2023 8.19   Final    VA Single Breath LLN 03/02/2023 8.19   Final    VA Single Breath Pre Ref 03/02/2023 76.7  % Final    IVC Single Breath Ref 03/02/2023 5.55   Final    IVC Single Breath LLN 03/02/2023 4.21   Final    IVC Single Breath Pre Ref 03/02/2023 78.4  % Final       Past Medical History:   Diagnosis Date    Coronary artery disease     Diabetes mellitus 07/1922    pt  on metformin in past, states DM resolved so no longer taking    Hypertension     MI (myocardial infarction)      Social History     Socioeconomic History    Marital status: Significant Other   Tobacco Use    Smoking status: Former     Passive exposure: Past    Smokeless tobacco: Never   Substance and Sexual Activity    Alcohol use: Yes     Comment: Occasionally    Drug use: Never    Sexual activity: Yes     Partners: Female     Birth control/protection: Coitus interruptus     Past Surgical History:   Procedure Laterality Date    cardiac stents      x 7    SHOULDER SURGERY Right 1975     History reviewed. No pertinent family history.    Review of patient's allergies indicates:  No Known Allergies    Current Outpatient Medications:     albuterol (PROVENTIL/VENTOLIN HFA) 90 mcg/actuation inhaler, 2 puffs every 4 hours as needed for cough, wheeze, or shortness of breath, Disp: 18 g, Rfl: 11    aspirin (ECOTRIN) 81 MG EC tablet, Aspir-81 mg tablet,delayed release  Take 1 tablet every day by oral route., Disp: , Rfl:     azelastine (ASTELIN) 137 mcg (0.1 %) nasal spray, 2 sprays (274 mcg total) by Nasal route 2 (two) times daily., Disp: 30 mL, Rfl: 11    buPROPion (WELLBUTRIN XL) 150 MG TB24 tablet, Take 1 tablet (150 mg total) by mouth 2 (two) times daily., Disp: 60 tablet, Rfl: 0    fluticasone propionate (FLONASE) 50 mcg/actuation nasal spray, 1 spray (50 mcg total) by Each Nostril route once daily., Disp: 16 g, Rfl: 11    fluticasone-umeclidin-vilanter (TRELEGY ELLIPTA) 200-62.5-25 mcg inhaler, Inhale 1 puff into the lungs once daily., Disp: 60 each, Rfl: 11    gabapentin (NEURONTIN) 300 MG capsule, Take 300 mg by mouth once daily., Disp: , Rfl:     losartan-hydrochlorothiazide 50-12.5 mg (HYZAAR) 50-12.5 mg per tablet, Take 1 tablet by mouth once daily., Disp: 180 tablet, Rfl: 1    metoprolol succinate (TOPROL-XL) 25 MG 24 hr tablet, Take 1 tablet (25 mg total) by mouth nightly., Disp: 90 tablet, Rfl: 1     "montelukast (SINGULAIR) 10 mg tablet, Take 1 tablet (10 mg total) by mouth every evening., Disp: 30 tablet, Rfl: 11    predniSONE (DELTASONE) 20 MG tablet, Take one daily for 3 days and may repeat for shortness of breath., Disp: 12 tablet, Rfl: 2    rosuvastatin (CRESTOR) 40 MG Tab, Take 1 tablet (40 mg total) by mouth every evening., Disp: 30 tablet, Rfl: 0    semaglutide (OZEMPIC) 0.25 mg or 0.5 mg (2 mg/3 mL) pen injector, Inject 0.5 mg into the skin every 7 days., Disp: 3 mL, Rfl: 5    sildenafiL (VIAGRA) 100 MG tablet, Take 1 tablet (100 mg total) by mouth daily as needed for Erectile Dysfunction., Disp: 30 tablet, Rfl: 2    Current Facility-Administered Medications:     albuterol sulfate nebulizer solution 2.5 mg, 2.5 mg, Nebulization, 1 time in Clinic/HOD, Biju Juarez MD    Review of Systems   Constitutional:  Negative for fatigue, fever and unexpected weight change.   HENT:  Negative for ear pain, sinus pressure and sore throat.    Eyes:  Negative for pain.   Respiratory:  Negative for cough and shortness of breath.    Cardiovascular:  Negative for chest pain and leg swelling.   Gastrointestinal:  Negative for abdominal pain, constipation, nausea and vomiting.   Genitourinary:  Negative for dysuria, frequency and urgency.   Musculoskeletal:  Negative for arthralgias.   Skin:  Negative for rash.   Neurological:  Negative for dizziness, weakness and headaches.   Psychiatric/Behavioral:  Negative for sleep disturbance.         Objective:      Vitals:    07/10/23 1121   BP: 124/70   Pulse: 65   SpO2: 98%   Weight: (!) 138.2 kg (304 lb 9.6 oz)   Height: 6' 4" (1.93 m)     Physical Exam  Vitals and nursing note reviewed.   Constitutional:       General: He is not in acute distress.     Appearance: Normal appearance. He is well-developed. He is obese.   HENT:      Head: Normocephalic and atraumatic.      Right Ear: External ear normal.      Left Ear: External ear normal.   Eyes:      Pupils: Pupils are equal, " round, and reactive to light.   Neck:      Trachea: No tracheal deviation.   Cardiovascular:      Rate and Rhythm: Normal rate and regular rhythm.      Heart sounds: No murmur heard.    No friction rub. No gallop.   Pulmonary:      Breath sounds: Normal breath sounds. No stridor. No wheezing or rales.   Abdominal:      Palpations: Abdomen is soft. There is no mass.      Tenderness: There is no abdominal tenderness.   Musculoskeletal:         General: No tenderness or deformity.      Cervical back: Neck supple.   Feet:      Right foot:      Protective Sensation: 5 sites tested.  5 sites sensed.      Left foot:      Protective Sensation: 5 sites tested.  5 sites sensed.   Lymphadenopathy:      Cervical: No cervical adenopathy.   Skin:     General: Skin is warm and dry.   Neurological:      Mental Status: He is alert and oriented to person, place, and time.      Coordination: Coordination normal.   Psychiatric:         Thought Content: Thought content normal.         Assessment:       1. Type 2 diabetes mellitus with hyperglycemia, without long-term current use of insulin    2. Hyperlipidemia, unspecified hyperlipidemia type    3. Hypertension, unspecified type    4. Coronary artery disease, unspecified vessel or lesion type, unspecified whether angina present, unspecified whether native or transplanted heart    5. Obstructive sleep apnea syndrome    6. Obesity, unspecified classification, unspecified obesity type, unspecified whether serious comorbidity present    7. Depressive disorder         Plan:       Type 2 diabetes mellitus with hyperglycemia, without long-term current use of insulin  Comments:  hga1c is down to 6.2  Orders:  -     Hemoglobin A1C, POCT  -      DIABETES FOOT EXAM  -     CBC Auto Differential; Future; Expected date: 07/10/2023  -     Comprehensive Metabolic Panel; Future; Expected date: 07/10/2023  -     Lipid Panel; Future; Expected date: 07/10/2023  -     TSH w/reflex to FT4; Future; Expected  date: 07/10/2023  -     Microalbumin/Creatinine Ratio, Urine; Future; Expected date: 07/10/2023  -     Urinalysis, Reflex to Urine Culture Urine, Clean Catch; Future; Expected date: 07/10/2023    Hyperlipidemia, unspecified hyperlipidemia type  Comments:  continue crestor. due for labs  Orders:  -     CBC Auto Differential; Future; Expected date: 07/10/2023  -     Comprehensive Metabolic Panel; Future; Expected date: 07/10/2023  -     Lipid Panel; Future; Expected date: 07/10/2023  -     TSH w/reflex to FT4; Future; Expected date: 07/10/2023  -     Microalbumin/Creatinine Ratio, Urine; Future; Expected date: 07/10/2023  -     Urinalysis, Reflex to Urine Culture Urine, Clean Catch; Future; Expected date: 07/10/2023    Hypertension, unspecified type  Comments:  bp in office is well controlled  Orders:  -     CBC Auto Differential; Future; Expected date: 07/10/2023  -     Comprehensive Metabolic Panel; Future; Expected date: 07/10/2023  -     Lipid Panel; Future; Expected date: 07/10/2023  -     TSH w/reflex to FT4; Future; Expected date: 07/10/2023  -     Microalbumin/Creatinine Ratio, Urine; Future; Expected date: 07/10/2023  -     Urinalysis, Reflex to Urine Culture Urine, Clean Catch; Future; Expected date: 07/10/2023    Coronary artery disease, unspecified vessel or lesion type, unspecified whether angina present, unspecified whether native or transplanted heart  Comments:  labs today    Obstructive sleep apnea syndrome  Comments:  use cpap nightly. followed by dr. flores    Obesity, unspecified classification, unspecified obesity type, unspecified whether serious comorbidity present  Comments:  diet and exercise    Depressive disorder  Comments:  continue wellbutrin    Other orders  -     semaglutide (OZEMPIC) 0.25 mg or 0.5 mg (2 mg/3 mL) pen injector; Inject 0.5 mg into the skin every 7 days.  Dispense: 3 mL; Refill: 5      Follow up in about 3 months (around 10/10/2023), or if symptoms worsen or fail to improve,  for medication management.        7/14/2023 Melissa Thurston

## 2023-07-14 RX ORDER — SEMAGLUTIDE 0.68 MG/ML
0.5 INJECTION, SOLUTION SUBCUTANEOUS
Qty: 3 ML | Refills: 5 | Status: SHIPPED | OUTPATIENT
Start: 2023-07-14 | End: 2023-12-12 | Stop reason: SDUPTHER

## 2023-07-22 ENCOUNTER — PATIENT MESSAGE (OUTPATIENT)
Dept: FAMILY MEDICINE | Facility: CLINIC | Age: 63
End: 2023-07-22

## 2023-07-22 DIAGNOSIS — F32.A DEPRESSIVE DISORDER: ICD-10-CM

## 2023-07-22 RX ORDER — BUPROPION HYDROCHLORIDE 150 MG/1
150 TABLET ORAL 2 TIMES DAILY
Qty: 60 TABLET | Refills: 0 | Status: CANCELLED | OUTPATIENT
Start: 2023-07-22 | End: 2023-08-21

## 2023-07-24 DIAGNOSIS — E78.5 HYPERLIPIDEMIA, UNSPECIFIED HYPERLIPIDEMIA TYPE: ICD-10-CM

## 2023-07-24 DIAGNOSIS — I10 HYPERTENSION, UNSPECIFIED TYPE: ICD-10-CM

## 2023-07-24 RX ORDER — METOPROLOL SUCCINATE 25 MG/1
25 TABLET, EXTENDED RELEASE ORAL NIGHTLY
Qty: 90 TABLET | Refills: 1 | Status: SHIPPED | OUTPATIENT
Start: 2023-07-24 | End: 2024-01-26 | Stop reason: SDUPTHER

## 2023-07-24 RX ORDER — ROSUVASTATIN CALCIUM 40 MG/1
40 TABLET, COATED ORAL NIGHTLY
Qty: 30 TABLET | Refills: 0 | Status: SHIPPED | OUTPATIENT
Start: 2023-07-24 | End: 2023-09-12 | Stop reason: SDUPTHER

## 2023-07-24 RX ORDER — BUPROPION HYDROCHLORIDE 150 MG/1
150 TABLET ORAL 2 TIMES DAILY
Qty: 60 TABLET | Refills: 5 | Status: SHIPPED | OUTPATIENT
Start: 2023-07-24 | End: 2024-02-11 | Stop reason: SDUPTHER

## 2023-09-12 DIAGNOSIS — E78.5 HYPERLIPIDEMIA, UNSPECIFIED HYPERLIPIDEMIA TYPE: ICD-10-CM

## 2023-09-12 RX ORDER — ROSUVASTATIN CALCIUM 40 MG/1
40 TABLET, COATED ORAL NIGHTLY
Qty: 30 TABLET | Refills: 0 | Status: SHIPPED | OUTPATIENT
Start: 2023-09-12 | End: 2023-09-25 | Stop reason: SDUPTHER

## 2023-09-22 ENCOUNTER — PATIENT OUTREACH (OUTPATIENT)
Dept: ADMINISTRATIVE | Facility: HOSPITAL | Age: 63
End: 2023-09-22
Payer: COMMERCIAL

## 2023-09-22 NOTE — LETTER
AUTHORIZATION FOR RELEASE OF   CONFIDENTIAL INFORMATION    Dear Dr. Hernandez ,    We are seeing Dayday Rodriguez, date of birth 1960, in the clinic at [address]. Melissa Thurston NP is the patient's PCP. Dayday Rodriguez has an outstanding lab/procedure at the time we reviewed his chart. In order to help keep his health information updated, he has authorized us to request the following medical record(s):                                               ( X )  EYE EXAM ( Most Recent )            Please fax records to [facility], Powell, Jodi, NP, [phone]     If you have any questions, please contact    Bang RUBALCAVA  Clinical Care Coordinator    [facility]  [phone] (Phone)  [phone] (Fax)         Patient Name: Dayday Rodriguez  : 1960  Patient Phone #: [phone]           CONSENT AND ACKNOWLEDGEMENT         FORM       Dayday Rodriguez  MRN: [id number]  : 1960  Age: 62 y.o.  Sex: male       MEDICARE-PATIENTS CERTIFICATION, AUTHORIZATION TO RELEASE INFORMATION AND PAYMENT REQUEST:  I certify that the information given by me in applying under the Title XVII of Social Security Act is correct.  I authorize any kern of medical or other information about me to release to the Social Security Administration or its intermediaries or carriers any information needed for this or a related Medicare claim.  I request that payment of authorized benefits be made on my behalf to [facility] and [facility] ([facility]).  I also acknowledge upon admission, that I received the Important Message from Medicare.     AUTHORIZATION TO PAY INSURANCE BENEFITS:  For and in consideration of medical services rendered to the patient named herein, I hereby assign and transfer to [facility], including but not limited to hospital based physicians, attending physicians, consulting physicians, nurse practitioners and  physicians assistants the rights for the payment of medical benefits which I may have under the policy/policies identified by me during registration or any policy which may be determined hereafter to pay benefits otherwise payable to me or to a beneficiary designated in the policy.  By this assignment, I authorize payment directly to [facility], hospital based physicians, attending physicians and consulting physicians of all medical benefits payable under the aforesaid policy/policies, but not to exceed the hospitals and/or clinic regular charges.     GUARANTEE OF ACCOUNT:  I/We certify that the information given is true and correct to the best of my/our knowledge.  I/We understand that bills are payable within thirty (30) days of the date of service.  If it becomes necessary for the account to be referred to an  or collection agency, the undersigned agrees to pay the reasonable s fees or collection expenses. I/We tacos permission and consent to [facility], our assignees, and third party collection agents to contact myself/us by any telephone number associated with myself/us, including wireless numbers and to leave answering machine and voicemail messages and include in any such messages, information required by law (including debt collection laws) and/or messages regarding amounts owed; to send text messages or emails using any email addresses I/we provided; to use pre-recorded/artificial voice messages  and/or an automatic dialing device in connection with any communications.   I/We agree to be responsible for the payment of all charges of this medical service and hospital based physicians, attending physicians and consulting physicians services rendered to the above named patient     COMMUNICATION AUTHORIZATION:   I hereby authorize Ivett Evans, to contact me on my cell phone and/or home phone using prerecorded messages, artificial voice messages, automatic telephone dialing  devices or other computer assisted technology, or by electronic mail, text messaging, or by any other form of electronic communication. This includes, but is not limited to, appointment reminders, yearly physical exam reminders, preventive care reminders, patient campaigns and welcome calls. I understand I have the right to opt out of these communications at any time.        Page 1 of 3                 CONSENT AND ACKNOWLEDGEMENT FORM CONTINUED     AUTHORIZATION TO RELEASE INFORMATION:  I hereby authorize [facility] and hospital based physicians to release the information for this occasion of service requested by my insurance company or third party payor for the purpose of obtaining payment for services rendered during this admission and/or to other healthcare providers for the purpose of follow-up care or evaluation of care.  This information may or may not include mental health and/or substance abuse information.     AUTHORIZATION FOR MEDICAL AND/OR SURGICAL TREATMENT:  I hereby authorize [facility] and its employees or agents to provide hospital care incident to this admission, including without limitations, consent to routine diagnostic procedures and medical treatment, which is to include whatever procedures that are deemed necessary by the admitting doctor and such other physicians or assistants as he may designate.     PERSONAL VALUABLES:      It is understood and agreed that the hospital maintains a safe for the safekeeping of money and valuables and the hospital shall not be liable for the loss of damage to any money, jewelry, glasses, documents, dentures, hearing aids or other articles of unusual value, unless placed therein, and shall not be liable for loss or damage to any other personal property, unless deposited with the hospital for safekeeping.  VALUABLES ARE NOT TO BE LEFT IN THE PATIENTS ROOM.     ADVANCE DIRECTIVES:  I understand that I am not required to have Advance Directives in  order to be treated.  I have received written information about my rights to formulate Advance Directives.     NOTICE OF PRIVACY PRACTICES/PATIENT RIGHTS/ADMISSION PACKET:  I acknowledge that I have received copies of the [facility] Notice of Privacy Practices, Patient Rights, and the Admission packet, which contains Smoking Cessation information. I understand that weapons, illegal drugs, or any other items considered  contraband, are not allowed on the [facility] campus, and that I do not have such items in my possession.        CONSENT TO PHOTOGRAPH AND/OR VIDEO TAPE DOCUMENTATION OF CARE:  I understand that photographs, videotapes, digital, or other images may be recorded to document my care.  I acknowledge that [facility] will retain the ownership rights to these photographs, videotapes, digital, or other images, and that I will be allowed access to view or obtain copies of any photographs, videotapes, digital, or other images created as part of the documentation of my care.  I understand that these images will be stored in a secure manner that will protect my privacy and that they will be kept for the time period required by law or by policy at [facility]. Images that identify me will be released and/or used outside the institution only upon written authorization from me or my legal representative (DELFINA, 2001).                                                                                                                                Page 2 of 3                    CONSENT AND ACKNOWLEDGEMENT FORM CONTINUED     [place] IMM[employer]ATION NETWORK (LINKS) PARTICIPATION:  I acknowledge that I have been informed about [place] Immunization Network, or LINKS.  I understand that it is a means to keep track of my immunization records for myself, doctors offices, hospitals and other health care providers through secure, electronic means.     INSURANCE NETWORK ACKNOWLEDGEMENT:                                                                             I acknowledge that I have received notice, based on the information available at this time, regarding the status of my insurance plan as in or out of network at [facility]. I understand that a full listing of accepted insurance plans can be found at the [facility] website.     NOTICE     HEALTH CARE SERVICES MAY BE PROVIDED TO YOU AT A NETWORK HEALTH CARE FACILITY BY FACILITY-BASED PHYSICIANS WHO ARE NOT IN YOUR HEALTH PLAN.  YOU MAY BE RESPONSIBLE FOR PAYMENT OF ALL OR PART OF THE FEES FOR THOSE OUT-OF-NETWORK SERVICES, IN ADDITION TO APPLICABLE AMOUNTS DUE FOR CO-PAYMENTS, COINSURANCE, DEDUCTIBLES, AND NON-COVERED SERVICES.  SPECIFIC INFORMATION ABOUT IN-NETWORK AND OUT-OF NETWORK FACILITY-BASED PHYSICIANS CAN BE FOUND AT THE WEBSITE ADDRESS OF YOUR HEALTH PLAN OR BY CALLING THE [employer] TELEPHONE NUMBER OF YOUR HEALTH PLAN.        I/WE HAVE READ, UNDERSTAND AND AGREE TO THE ABOVE.        _______________________________   Patient/Legal Guardian Signature     This signature was collected at 11/10/2022     Time (if no electronic signature):____________     Dayday Rodriguez     Self  _______________________________   Printed Name/Relationship to Patient       Witness  Sign Here  _______________________________   Witness Signature     This signature was collected at 11/10/2022        _______________________________   Printed Name         Page 3 of 3

## 2023-09-22 NOTE — PROGRESS NOTES
Population Health Chart Review & Patient Outreach Details:     Reason for Outreach Encounter:     [x]  Non-Compliant Report   []  Payor Report (Humana, PHN, BCBS, MSSP, MCIP, UHC, etc.)   []  Pre-Visit Chart Review     Updates Requested / Reviewed:     []  Care Everywhere    []     [x]  External Sources ([facility], [facility], DIS, etc.)   [x]  Care Team Updated    Patient Outreach Method:    []  Telephone Outreach Completed   [] Successful   [] Left Voicemail   [] Unable to Contact (wrong number, no voicemail)  []  [facility] Outreach Sent  []  Letter Outreach Mailed  [x]  Fax Sent for External Records  []  External Records Upload    Health Maintenance Topics Addressed and Outreach Outcomes / Actions Taken:        []      Breast Cancer Screening []  Mammo Scheduled      []  External Records Requested     []  Added Reminder to Complete to Upcoming Primary Care Appt Notes     []  Patient Declined     []  Patient Will Call Back to Schedule     []  Patient Will Schedule with External Provider / Order Routed if Applicable             []       Cervical Cancer Screening []  Pap Scheduled      []  External Records Requested     []  Added Reminder to Complete to Upcoming Primary Care Appt Notes     []  Patient Declined     []  Patient Will Call Back to Schedule     []  Patient Will Schedule with External Provider               [x]          Colorectal Cancer Screening []  Colonoscopy Case Request or Referral Placed     []  External Records Requested     []  Added Reminder to Complete to Upcoming Primary Care Appt Notes     [x]  Patient Declined     []  Patient Will Call Back to Schedule     []  Patient Will Schedule with External Provider     []  Fit Kit Mailed (add the SmartPhrase under additional notes)     []  Reminded Patient to Complete Home Test             [x]      Diabetic Eye Exam []  Eye Camera Scheduled or Optometry Referral Placed     [x]  External Records Requested     []  Added Reminder to  Complete to Upcoming Primary Care Appt Notes     []  Patient Declined     []  Patient Will Call Back to Schedule     []  Patient Will Schedule with External Provider             []      Blood Pressure Control []  Primary Care Follow Up Visit Scheduled     []  Remote Blood Pressure Reading Captured     []  Added Reminder to Complete to Upcoming Primary Care Appt Notes     []  Patient Declined     []  Patient Will Call Back / Patient Will Send Portal Message with Reading     []  Patient Will Call Back to Schedule Provider Visit             [x]       HbA1c & Other Labs []  Lab Appt Scheduled for Due Labs     []  Primary Care Follow Up Visit Scheduled      []  Reminded Patient to Complete Home Test     [x]  Added Reminder to Complete to Upcoming Primary Care Appt Notes     []  Patient Declined     []  Patient Will Call Back to Schedule     []  Patient Will Schedule with External Provider / Order Routed if Applicable           []    Schedule Primary Care Appt []  Primary Care Appt Scheduled     []  Patient Declined     []  Patient Will Call Back to Schedule     []  Pt Established with External Provider & Updated Care Team             []      Medication Adherence []  Primary Care Appointment Scheduled     []  Added Reminder to Upcoming Primary Care Appt Notes     []  Patient Reminded to  Prescription     []  Patient Declined, Provider Notified if Needed     []  Sent Provider Message to Review and/or Add Exclusion to Problem List             []      Osteoporosis Screening []  DXA Appointment Scheduled     []  External Records Requested     []  Added Reminder to Complete to Upcoming Primary Care Appt Notes     []  Patient Declined     []  Patient Will Call Back to Schedule     []  Patient Will Schedule with External Provider / Order Routed if Applicable     Additional Care Coordinator Notes:         Further Action Needed If Patient Returns Outreach:

## 2023-09-25 DIAGNOSIS — E78.5 HYPERLIPIDEMIA, UNSPECIFIED HYPERLIPIDEMIA TYPE: ICD-10-CM

## 2023-09-25 DIAGNOSIS — I10 HYPERTENSION, UNSPECIFIED TYPE: ICD-10-CM

## 2023-09-25 RX ORDER — ROSUVASTATIN CALCIUM 40 MG/1
40 TABLET, COATED ORAL NIGHTLY
Qty: 30 TABLET | Refills: 0 | Status: SHIPPED | OUTPATIENT
Start: 2023-09-25 | End: 2023-11-01 | Stop reason: SDUPTHER

## 2023-09-25 RX ORDER — LOSARTAN POTASSIUM AND HYDROCHLOROTHIAZIDE 12.5; 5 MG/1; MG/1
1 TABLET ORAL DAILY
Qty: 180 TABLET | Refills: 1 | Status: SHIPPED | OUTPATIENT
Start: 2023-09-25

## 2023-10-20 RX ORDER — SEMAGLUTIDE 0.68 MG/ML
INJECTION, SOLUTION SUBCUTANEOUS
Qty: 3 ML | Refills: 1 | Status: CANCELLED | OUTPATIENT
Start: 2023-10-20

## 2023-11-01 DIAGNOSIS — E78.5 HYPERLIPIDEMIA, UNSPECIFIED HYPERLIPIDEMIA TYPE: ICD-10-CM

## 2023-11-01 DIAGNOSIS — R09.89 CHRONIC SINUS COMPLAINTS: ICD-10-CM

## 2023-11-01 RX ORDER — FLUTICASONE PROPIONATE 50 MCG
1 SPRAY, SUSPENSION (ML) NASAL DAILY
Qty: 16 G | Refills: 11 | Status: SHIPPED | OUTPATIENT
Start: 2023-11-01

## 2023-11-01 RX ORDER — AZELASTINE 1 MG/ML
2 SPRAY, METERED NASAL 2 TIMES DAILY
Qty: 30 ML | Refills: 11 | Status: SHIPPED | OUTPATIENT
Start: 2023-11-01 | End: 2024-10-31

## 2023-11-01 RX ORDER — ROSUVASTATIN CALCIUM 40 MG/1
40 TABLET, COATED ORAL NIGHTLY
Qty: 30 TABLET | Refills: 0 | Status: SHIPPED | OUTPATIENT
Start: 2023-11-01 | End: 2023-11-29 | Stop reason: SDUPTHER

## 2023-11-29 DIAGNOSIS — R06.02 SOB (SHORTNESS OF BREATH): ICD-10-CM

## 2023-11-29 DIAGNOSIS — E78.5 HYPERLIPIDEMIA, UNSPECIFIED HYPERLIPIDEMIA TYPE: ICD-10-CM

## 2023-11-29 RX ORDER — ALBUTEROL SULFATE 90 UG/1
AEROSOL, METERED RESPIRATORY (INHALATION)
Qty: 18 G | Refills: 11 | Status: SHIPPED | OUTPATIENT
Start: 2023-11-29

## 2023-11-29 RX ORDER — ROSUVASTATIN CALCIUM 40 MG/1
40 TABLET, COATED ORAL NIGHTLY
Qty: 30 TABLET | Refills: 0 | Status: SHIPPED | OUTPATIENT
Start: 2023-11-29 | End: 2023-12-26 | Stop reason: SDUPTHER

## 2023-12-09 RX ORDER — SEMAGLUTIDE 0.68 MG/ML
0.5 INJECTION, SOLUTION SUBCUTANEOUS
Qty: 3 ML | Refills: 5 | Status: CANCELLED | OUTPATIENT
Start: 2023-12-09

## 2023-12-12 ENCOUNTER — PATIENT MESSAGE (OUTPATIENT)
Dept: FAMILY MEDICINE | Facility: CLINIC | Age: 63
End: 2023-12-12

## 2023-12-12 RX ORDER — SEMAGLUTIDE 0.68 MG/ML
0.5 INJECTION, SOLUTION SUBCUTANEOUS
Qty: 3 ML | Refills: 5 | Status: SHIPPED | OUTPATIENT
Start: 2023-12-12

## 2023-12-24 DIAGNOSIS — E11.65 TYPE 2 DIABETES MELLITUS WITH HYPERGLYCEMIA, WITHOUT LONG-TERM CURRENT USE OF INSULIN: ICD-10-CM

## 2023-12-24 DIAGNOSIS — E78.5 HYPERLIPIDEMIA, UNSPECIFIED HYPERLIPIDEMIA TYPE: ICD-10-CM

## 2023-12-24 DIAGNOSIS — Z12.5 ENCOUNTER FOR SCREENING FOR MALIGNANT NEOPLASM OF PROSTATE: ICD-10-CM

## 2023-12-24 DIAGNOSIS — I10 PRIMARY HYPERTENSION: Primary | ICD-10-CM

## 2023-12-24 DIAGNOSIS — J45.50 SEVERE PERSISTENT ASTHMA WITHOUT COMPLICATION: Primary | ICD-10-CM

## 2023-12-24 RX ORDER — ROSUVASTATIN CALCIUM 40 MG/1
40 TABLET, COATED ORAL NIGHTLY
Qty: 30 TABLET | Refills: 0 | Status: CANCELLED | OUTPATIENT
Start: 2023-12-24 | End: 2024-01-23

## 2023-12-26 RX ORDER — ROSUVASTATIN CALCIUM 40 MG/1
40 TABLET, COATED ORAL NIGHTLY
Qty: 30 TABLET | Refills: 0 | Status: SHIPPED | OUTPATIENT
Start: 2023-12-26 | End: 2024-02-03 | Stop reason: SDUPTHER

## 2023-12-26 RX ORDER — FLUTICASONE FUROATE, UMECLIDINIUM BROMIDE AND VILANTEROL TRIFENATATE 200; 62.5; 25 UG/1; UG/1; UG/1
POWDER RESPIRATORY (INHALATION)
Qty: 180 EACH | Refills: 3 | Status: SHIPPED | OUTPATIENT
Start: 2023-12-26

## 2023-12-27 ENCOUNTER — PATIENT MESSAGE (OUTPATIENT)
Dept: PULMONOLOGY | Facility: CLINIC | Age: 63
End: 2023-12-27

## 2023-12-27 ENCOUNTER — OFFICE VISIT (OUTPATIENT)
Dept: PULMONOLOGY | Facility: CLINIC | Age: 63
End: 2023-12-27
Payer: COMMERCIAL

## 2023-12-27 VITALS
OXYGEN SATURATION: 97 % | DIASTOLIC BLOOD PRESSURE: 82 MMHG | SYSTOLIC BLOOD PRESSURE: 167 MMHG | HEART RATE: 86 BPM | WEIGHT: 305.13 LBS | BODY MASS INDEX: 37.16 KG/M2 | HEIGHT: 76 IN

## 2023-12-27 DIAGNOSIS — D75.1 POLYCYTHEMIA: ICD-10-CM

## 2023-12-27 DIAGNOSIS — G47.33 OBSTRUCTIVE SLEEP APNEA SYNDROME: ICD-10-CM

## 2023-12-27 DIAGNOSIS — R06.09 DOE (DYSPNEA ON EXERTION): Primary | ICD-10-CM

## 2023-12-27 DIAGNOSIS — R73.03 PREDIABETES: ICD-10-CM

## 2023-12-27 DIAGNOSIS — R06.6 HICCUPS: ICD-10-CM

## 2023-12-27 DIAGNOSIS — J45.50 SEVERE PERSISTENT ASTHMA WITHOUT COMPLICATION: ICD-10-CM

## 2023-12-27 PROCEDURE — 3044F HG A1C LEVEL LT 7.0%: CPT | Mod: CPTII,S$GLB,, | Performed by: INTERNAL MEDICINE

## 2023-12-27 PROCEDURE — 3008F BODY MASS INDEX DOCD: CPT | Mod: CPTII,S$GLB,, | Performed by: INTERNAL MEDICINE

## 2023-12-27 PROCEDURE — 99999 PR PBB SHADOW E&M-EST. PATIENT-LVL III: ICD-10-PCS | Mod: PBBFAC,,, | Performed by: INTERNAL MEDICINE

## 2023-12-27 PROCEDURE — 3079F DIAST BP 80-89 MM HG: CPT | Mod: CPTII,S$GLB,, | Performed by: INTERNAL MEDICINE

## 2023-12-27 PROCEDURE — 3077F PR MOST RECENT SYSTOLIC BLOOD PRESSURE >= 140 MM HG: ICD-10-PCS | Mod: CPTII,S$GLB,, | Performed by: INTERNAL MEDICINE

## 2023-12-27 PROCEDURE — 99213 PR OFFICE/OUTPT VISIT, EST, LEVL III, 20-29 MIN: ICD-10-PCS | Mod: S$GLB,,, | Performed by: INTERNAL MEDICINE

## 2023-12-27 PROCEDURE — 3008F PR BODY MASS INDEX (BMI) DOCUMENTED: ICD-10-PCS | Mod: CPTII,S$GLB,, | Performed by: INTERNAL MEDICINE

## 2023-12-27 PROCEDURE — 3079F PR MOST RECENT DIASTOLIC BLOOD PRESSURE 80-89 MM HG: ICD-10-PCS | Mod: CPTII,S$GLB,, | Performed by: INTERNAL MEDICINE

## 2023-12-27 PROCEDURE — 99213 OFFICE O/P EST LOW 20 MIN: CPT | Mod: S$GLB,,, | Performed by: INTERNAL MEDICINE

## 2023-12-27 PROCEDURE — 3077F SYST BP >= 140 MM HG: CPT | Mod: CPTII,S$GLB,, | Performed by: INTERNAL MEDICINE

## 2023-12-27 PROCEDURE — 3044F PR MOST RECENT HEMOGLOBIN A1C LEVEL <7.0%: ICD-10-PCS | Mod: CPTII,S$GLB,, | Performed by: INTERNAL MEDICINE

## 2023-12-27 PROCEDURE — 99999 PR PBB SHADOW E&M-EST. PATIENT-LVL III: CPT | Mod: PBBFAC,,, | Performed by: INTERNAL MEDICINE

## 2023-12-27 RX ORDER — PANTOPRAZOLE SODIUM 40 MG/1
40 TABLET, DELAYED RELEASE ORAL DAILY
Qty: 30 TABLET | Refills: 11 | Status: SHIPPED | OUTPATIENT
Start: 2023-12-27 | End: 2024-12-26

## 2023-12-27 RX ORDER — PREDNISONE 20 MG/1
TABLET ORAL
Qty: 12 TABLET | Refills: 2 | Status: SHIPPED | OUTPATIENT
Start: 2023-12-27

## 2023-12-27 NOTE — PATIENT INSTRUCTIONS
Breathing worsening   Take prednisone now    Follow up chest xray       Hiccups may be from reflux.   Use protonix and chest xray   May need ct or endoscope    Continue trelegy     Check blood work  (cbc, cmp, ddimer, bnp, tsh) and chest xray.        If breathing remains poorly---may need repeat breathing test and ct chest      Continue sleep apnea treatment--       if clears short breath -- follow up 6 months, otherwise 6 weeks.....
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
## 2023-12-31 DIAGNOSIS — F32.A DEPRESSIVE DISORDER: ICD-10-CM

## 2023-12-31 RX ORDER — BUPROPION HYDROCHLORIDE 150 MG/1
150 TABLET ORAL 2 TIMES DAILY
Qty: 60 TABLET | Refills: 5 | Status: CANCELLED | OUTPATIENT
Start: 2023-12-31

## 2024-01-02 RX ORDER — METOPROLOL SUCCINATE 25 MG/1
TABLET, EXTENDED RELEASE ORAL
Qty: 90 TABLET | Refills: 1 | OUTPATIENT
Start: 2024-01-02

## 2024-01-03 ENCOUNTER — HOSPITAL ENCOUNTER (OUTPATIENT)
Dept: RADIOLOGY | Facility: HOSPITAL | Age: 64
Discharge: HOME | End: 2024-01-03
Attending: INTERNAL MEDICINE
Payer: COMMERCIAL

## 2024-01-03 ENCOUNTER — HOSPITAL ENCOUNTER (OUTPATIENT)
Dept: PULMONOLOGY | Facility: HOSPITAL | Age: 64
Discharge: HOME | End: 2024-01-03
Attending: INTERNAL MEDICINE
Payer: COMMERCIAL

## 2024-01-03 DIAGNOSIS — R06.09 DOE (DYSPNEA ON EXERTION): ICD-10-CM

## 2024-01-03 DIAGNOSIS — J45.50 SEVERE PERSISTENT ASTHMA WITHOUT COMPLICATION: ICD-10-CM

## 2024-01-03 LAB
DLCO SINGLE BREATH LLN: 26.69
DLCO SINGLE BREATH PRE REF: 75 %
DLCO SINGLE BREATH REF: 33.62
DLCOC SBVA LLN: 3.03
DLCOC SBVA REF: 4.03
DLCOC SINGLE BREATH LLN: 26.69
DLCOC SINGLE BREATH REF: 33.62
DLCOVA LLN: 3.03
DLCOVA PRE REF: 105.1 %
DLCOVA PRE: 4.24 ML/(MIN*MMHG*L) (ref 3.03–5.03)
DLCOVA REF: 4.03
ERV LLN: -16448.69
ERV PRE REF: 86.5 %
ERV REF: 1.31
FEF 25 75 CHG: 19.5 %
FEF 25 75 LLN: 1.53
FEF 25 75 POST REF: 166.3 %
FEF 25 75 PRE REF: 139.1 %
FEF 25 75 REF: 3.22
FET100 CHG: -19.3 %
FEV1 CHG: 2.7 %
FEV1 FVC CHG: 3.7 %
FEV1 FVC LLN: 64
FEV1 FVC POST REF: 112.9 %
FEV1 FVC PRE REF: 108.9 %
FEV1 FVC REF: 76
FEV1 LLN: 3.1
FEV1 POST REF: 90.1 %
FEV1 PRE REF: 87.7 %
FEV1 REF: 4.16
FRCPLETH LLN: 3.01
FRCPLETH PREREF: 71.1 %
FRCPLETH REF: 3.99
FVC CHG: -0.9 %
FVC LLN: 4.17
FVC POST REF: 79.4 %
FVC PRE REF: 80.1 %
FVC REF: 5.51
IVC PRE: 4.34 L (ref 4.17–6.87)
IVC SINGLE BREATH LLN: 4.17
IVC SINGLE BREATH PRE REF: 78.8 %
IVC SINGLE BREATH REF: 5.51
MVV LLN: 135
MVV PRE REF: 62.3 %
MVV REF: 159
PEF CHG: 6.6 %
PEF LLN: 7.67
PEF POST REF: 101.9 %
PEF PRE REF: 95.6 %
PEF REF: 10.4
POST FEF 25 75: 5.36 L/S (ref 1.53–5.54)
POST FET 100: 7.94 SEC
POST FEV1 FVC: 85.82 % (ref 63.72–86.71)
POST FEV1: 3.75 L (ref 3.1–5.16)
POST FVC: 4.37 L (ref 4.17–6.87)
POST PEF: 10.6 L/S (ref 7.67–13.13)
PRE DLCO: 25.21 ML/(MIN*MMHG) (ref 26.69–40.55)
PRE ERV: 1.13 L (ref -16448.69–16451.31)
PRE FEF 25 75: 4.48 L/S (ref 1.53–5.54)
PRE FET 100: 9.83 SEC
PRE FEV1 FVC: 82.77 % (ref 63.72–86.71)
PRE FEV1: 3.65 L (ref 3.1–5.16)
PRE FRC PL: 2.84 L (ref 3.01–4.98)
PRE FVC: 4.41 L (ref 4.17–6.87)
PRE MVV: 99.28 L/MIN (ref 135.45–183.25)
PRE PEF: 9.94 L/S (ref 7.67–13.13)
PRE RV: 1.71 L (ref 2.01–3.36)
PRE TLC: 6.12 L (ref 7.19–9.5)
RAW LLN: 3.06
RAW PRE REF: 96.8 %
RAW PRE: 2.96 CMH2O*S/L (ref 3.06–3.06)
RAW REF: 3.06
RV LLN: 2.01
RV PRE REF: 63.6 %
RV REF: 2.68
RVTLC LLN: 30
RVTLC PRE REF: 72.4 %
RVTLC PRE: 27.9 % (ref 29.55–47.51)
RVTLC REF: 39
TLC LLN: 7.19
TLC PRE REF: 73.3 %
TLC REF: 8.34
VA PRE: 5.95 L (ref 8.19–8.19)
VA SINGLE BREATH LLN: 8.19
VA SINGLE BREATH PRE REF: 72.6 %
VA SINGLE BREATH REF: 8.19
VC LLN: 4.17
VC PRE REF: 80.1 %
VC PRE: 4.41 L (ref 4.17–6.87)
VC REF: 5.51

## 2024-01-03 PROCEDURE — 94726 PLETHYSMOGRAPHY LUNG VOLUMES: CPT

## 2024-01-03 PROCEDURE — 71046 X-RAY EXAM CHEST 2 VIEWS: CPT | Mod: 26,,, | Performed by: RADIOLOGY

## 2024-01-03 PROCEDURE — 94729 DIFFUSING CAPACITY: CPT

## 2024-01-03 PROCEDURE — 94060 EVALUATION OF WHEEZING: CPT | Mod: 26,,, | Performed by: INTERNAL MEDICINE

## 2024-01-03 PROCEDURE — 94729 DIFFUSING CAPACITY: CPT | Mod: 26,,, | Performed by: INTERNAL MEDICINE

## 2024-01-03 PROCEDURE — 94726 PLETHYSMOGRAPHY LUNG VOLUMES: CPT | Mod: 26,,, | Performed by: INTERNAL MEDICINE

## 2024-01-03 PROCEDURE — 94060 EVALUATION OF WHEEZING: CPT

## 2024-01-03 PROCEDURE — 71046 X-RAY EXAM CHEST 2 VIEWS: CPT | Mod: TC

## 2024-01-03 RX ORDER — ALBUTEROL SULFATE 2.5 MG/.5ML
SOLUTION RESPIRATORY (INHALATION)
Status: DISCONTINUED
Start: 2024-01-03 | End: 2024-01-04 | Stop reason: HOSPADM

## 2024-01-07 DIAGNOSIS — F32.A DEPRESSIVE DISORDER: ICD-10-CM

## 2024-01-07 RX ORDER — BUPROPION HYDROCHLORIDE 150 MG/1
150 TABLET ORAL 2 TIMES DAILY
Qty: 60 TABLET | Refills: 5 | Status: CANCELLED | OUTPATIENT
Start: 2024-01-07

## 2024-01-08 NOTE — TELEPHONE ENCOUNTER
Pt is needing a refill on his bupropion. Last office visit 07/10/2023. Next office visit 01/09/2024.       Left message on voice mail for the pt to call back, in regards to medication refills request. Pt  has an appt tomorrow with Melissa.

## 2024-01-09 ENCOUNTER — OFFICE VISIT (OUTPATIENT)
Dept: FAMILY MEDICINE | Facility: CLINIC | Age: 64
End: 2024-01-09
Payer: COMMERCIAL

## 2024-01-09 VITALS
HEIGHT: 76 IN | BODY MASS INDEX: 37.38 KG/M2 | OXYGEN SATURATION: 98 % | HEART RATE: 68 BPM | WEIGHT: 307 LBS | DIASTOLIC BLOOD PRESSURE: 70 MMHG | SYSTOLIC BLOOD PRESSURE: 136 MMHG

## 2024-01-09 DIAGNOSIS — E78.5 HYPERLIPIDEMIA, UNSPECIFIED HYPERLIPIDEMIA TYPE: ICD-10-CM

## 2024-01-09 DIAGNOSIS — E11.65 TYPE 2 DIABETES MELLITUS WITH HYPERGLYCEMIA, WITHOUT LONG-TERM CURRENT USE OF INSULIN: ICD-10-CM

## 2024-01-09 DIAGNOSIS — I25.10 CORONARY ARTERY DISEASE, UNSPECIFIED VESSEL OR LESION TYPE, UNSPECIFIED WHETHER ANGINA PRESENT, UNSPECIFIED WHETHER NATIVE OR TRANSPLANTED HEART: ICD-10-CM

## 2024-01-09 DIAGNOSIS — G47.33 OBSTRUCTIVE SLEEP APNEA SYNDROME: ICD-10-CM

## 2024-01-09 DIAGNOSIS — E66.01 CLASS 2 SEVERE OBESITY DUE TO EXCESS CALORIES WITH SERIOUS COMORBIDITY IN ADULT, UNSPECIFIED BMI: Primary | ICD-10-CM

## 2024-01-09 DIAGNOSIS — I25.10 CORONARY ARTERY DISEASE INVOLVING NATIVE CORONARY ARTERY OF NATIVE HEART WITHOUT ANGINA PECTORIS: ICD-10-CM

## 2024-01-09 DIAGNOSIS — Z79.899 HIGH RISK MEDICATION USE: ICD-10-CM

## 2024-01-09 DIAGNOSIS — I10 PRIMARY HYPERTENSION: ICD-10-CM

## 2024-01-09 PROCEDURE — 1160F RVW MEDS BY RX/DR IN RCRD: CPT | Mod: CPTII,S$GLB,, | Performed by: NURSE PRACTITIONER

## 2024-01-09 PROCEDURE — 1159F MED LIST DOCD IN RCRD: CPT | Mod: CPTII,S$GLB,, | Performed by: NURSE PRACTITIONER

## 2024-01-09 PROCEDURE — 3078F DIAST BP <80 MM HG: CPT | Mod: CPTII,S$GLB,, | Performed by: NURSE PRACTITIONER

## 2024-01-09 PROCEDURE — 3008F BODY MASS INDEX DOCD: CPT | Mod: CPTII,S$GLB,, | Performed by: NURSE PRACTITIONER

## 2024-01-09 PROCEDURE — 3075F SYST BP GE 130 - 139MM HG: CPT | Mod: CPTII,S$GLB,, | Performed by: NURSE PRACTITIONER

## 2024-01-09 PROCEDURE — 99214 OFFICE O/P EST MOD 30 MIN: CPT | Mod: S$GLB,,, | Performed by: NURSE PRACTITIONER

## 2024-01-09 RX ORDER — SEMAGLUTIDE 0.68 MG/ML
0.5 INJECTION, SOLUTION SUBCUTANEOUS
Qty: 3 ML | Refills: 5 | Status: SHIPPED | OUTPATIENT
Start: 2024-01-09

## 2024-01-09 NOTE — PROGRESS NOTES
SUBJECTIVE:    Patient ID: Dayday Rodriguez is a 63 y.o. male.    Chief Complaint: Follow-up (No bottles /pt refused flu shot/ pt refused colonoscopy/ last eye exam 06/2023 with Dr. Lazo//HP)    63 year old male presents for check up. Wife is present during the exam. Treated for depression, cad, hypertension, dm, hyperlipidemia.  seeing dr. Juarez regularly. taking trelegy.  Using cpap nightly.  bp is well controlled in office is well controlled.   Has labs ordered by dr. Juarez. Plans to have done this week. Moods are well controlled.         Hospital Outpatient Visit on 01/03/2024   Component Date Value Ref Range Status    Post FVC 01/03/2024 4.37  4.17 - 6.87 L Preliminary    Post FEV1 01/03/2024 3.75  3.10 - 5.16 L Preliminary    Post FEV1 FVC 01/03/2024 85.82  63.72 - 86.71 % Preliminary    Post FEF 25 75 01/03/2024 5.36  1.53 - 5.54 L/s Preliminary    Post PEF 01/03/2024 10.60  7.67 - 13.13 L/s Preliminary    Post  01/03/2024 7.94  sec Preliminary    Pre DLCO 01/03/2024 25.21 (L)  26.69 - 40.55 ml/(min*mmHg) Preliminary    DLCOVA PRE 01/03/2024 4.24  3.03 - 5.03 ml/(min*mmHg*L) Preliminary    VA PRE 01/03/2024 5.95 (L)  8.19 - 8.19 L Preliminary    IVC PRE 01/03/2024 4.34  4.17 - 6.87 L Preliminary    Pre TLC 01/03/2024 6.12 (L)  7.19 - 9.50 L Preliminary    VC PRE 01/03/2024 4.41  4.17 - 6.87 L Preliminary    Pre FRC PL 01/03/2024 2.84 (L)  3.01 - 4.98 L Preliminary    Pre ERV 01/03/2024 1.13  -81617.69 - 39395.31 L Preliminary    Pre RV 01/03/2024 1.71 (L)  2.01 - 3.36 L Preliminary    RVTLC PRE 01/03/2024 27.90 (L)  29.55 - 47.51 % Preliminary    Raw PRE 01/03/2024 2.96 (L)  3.06 - 3.06 cmH2O*s/L Preliminary    Pre FVC 01/03/2024 4.41  4.17 - 6.87 L Preliminary    Pre FEV1 01/03/2024 3.65  3.10 - 5.16 L Preliminary    Pre FEV1 FVC 01/03/2024 82.77  63.72 - 86.71 % Preliminary    Pre FEF 25 75 01/03/2024 4.48  1.53 - 5.54 L/s Preliminary    Pre PEF 01/03/2024 9.94  7.67 - 13.13 L/s Preliminary     Pre  01/03/2024 9.83  sec Preliminary    Pre MVV 01/03/2024 99.28 (L)  135.45 - 183.25 L/min Preliminary    FVC Ref 01/03/2024 5.51   Preliminary    FVC LLN 01/03/2024 4.17   Preliminary    FVC Pre Ref 01/03/2024 80.1  % Preliminary    FVC Post Ref 01/03/2024 79.4  % Preliminary    FVC Chg 01/03/2024 -0.9  % Preliminary    FEV1 Ref 01/03/2024 4.16   Preliminary    FEV1 LLN 01/03/2024 3.10   Preliminary    FEV1 Pre Ref 01/03/2024 87.7  % Preliminary    FEV1 Post Ref 01/03/2024 90.1  % Preliminary    FEV1 Chg 01/03/2024 2.7  % Preliminary    FEV1 FVC Ref 01/03/2024 76   Preliminary    FEV1 FVC LLN 01/03/2024 64   Preliminary    FEV1 FVC Pre Ref 01/03/2024 108.9  % Preliminary    FEV1 FVC Post Ref 01/03/2024 112.9  % Preliminary    FEV1 FVC Chg 01/03/2024 3.7  % Preliminary    FEF 25 75 Ref 01/03/2024 3.22   Preliminary    FEF 25 75 LLN 01/03/2024 1.53   Preliminary    FEF 25 75 Pre Ref 01/03/2024 139.1  % Preliminary    FEF 25 75 Post Ref 01/03/2024 166.3  % Preliminary    FEF 25 75 Chg 01/03/2024 19.5  % Preliminary    PEF Ref 01/03/2024 10.40   Preliminary    PEF LLN 01/03/2024 7.67   Preliminary    PEF Pre Ref 01/03/2024 95.6  % Preliminary    PEF Post Ref 01/03/2024 101.9  % Preliminary    PEF Chg 01/03/2024 6.6  % Preliminary    TAU217 Chg 01/03/2024 -19.3  % Preliminary    MVV Ref 01/03/2024 159   Preliminary    MVV LLN 01/03/2024 135   Preliminary    MVV Pre Ref 01/03/2024 62.3  % Preliminary    TLC Ref 01/03/2024 8.34   Preliminary    TLC LLN 01/03/2024 7.19   Preliminary    TLC Pre Ref 01/03/2024 73.3  % Preliminary    VC Ref 01/03/2024 5.51   Preliminary    VC LLN 01/03/2024 4.17   Preliminary    VC Pre Ref 01/03/2024 80.1  % Preliminary    FRCpleth Ref 01/03/2024 3.99   Preliminary    FRCpleth LLN 01/03/2024 3.01   Preliminary    FRCpleth PreRef 01/03/2024 71.1  % Preliminary    ERV Ref 01/03/2024 1.31   Preliminary    ERV LLN 01/03/2024 -17808.69   Preliminary    ERV Pre Ref 01/03/2024 86.5  %  Preliminary    RV Ref 01/03/2024 2.68   Preliminary    RV LLN 01/03/2024 2.01   Preliminary    RV Pre Ref 01/03/2024 63.6  % Preliminary    RVTLC Ref 01/03/2024 39   Preliminary    RVTLC LLN 01/03/2024 30   Preliminary    RVTLC Pre Ref 01/03/2024 72.4  % Preliminary    Raw Ref 01/03/2024 3.06   Preliminary    Raw LLN 01/03/2024 3.06   Preliminary    Raw Pre Ref 01/03/2024 96.8  % Preliminary    DLCO Single Breath Ref 01/03/2024 33.62   Preliminary    DLCO Single Breath LLN 01/03/2024 26.69   Preliminary    DLCO Single Breath Pre Ref 01/03/2024 75.0  % Preliminary    DLCOc Single Breath Ref 01/03/2024 33.62   Preliminary    DLCOc Single Breath LLN 01/03/2024 26.69   Preliminary    DLCOVA Ref 01/03/2024 4.03   Preliminary    DLCOVA LLN 01/03/2024 3.03   Preliminary    DLCOVA Pre Ref 01/03/2024 105.1  % Preliminary    DLCOc SBVA Ref 01/03/2024 4.03   Preliminary    DLCOc SBVA LLN 01/03/2024 3.03   Preliminary    VA Single Breath Ref 01/03/2024 8.19   Preliminary    VA Single Breath LLN 01/03/2024 8.19   Preliminary    VA Single Breath Pre Ref 01/03/2024 72.6  % Preliminary    IVC Single Breath Ref 01/03/2024 5.51   Preliminary    IVC Single Breath LLN 01/03/2024 4.17   Preliminary    IVC Single Breath Pre Ref 01/03/2024 78.8  % Preliminary   Office Visit on 07/10/2023   Component Date Value Ref Range Status    Hemoglobin A1C, POC 07/10/2023 6.2  % Final    WBC 01/13/2024 8.0  3.4 - 10.8 x10E3/uL Final    RBC 01/13/2024 6.12 (H)  4.14 - 5.80 x10E6/uL Final    Hemoglobin 01/13/2024 17.4  13.0 - 17.7 g/dL Final    Hematocrit 01/13/2024 52.7 (H)  37.5 - 51.0 % Final    MCV 01/13/2024 86  79 - 97 fL Final    MCH 01/13/2024 28.4  26.6 - 33.0 pg Final    MCHC 01/13/2024 33.0  31.5 - 35.7 g/dL Final    RDW 01/13/2024 13.9  11.6 - 15.4 % Final    Platelets 01/13/2024 198  150 - 450 x10E3/uL Final    Neutrophils 01/13/2024 72  Not Estab. % Final    Lymphs 01/13/2024 18  Not Estab. % Final    Monocytes 01/13/2024 10  Not Estab. %  Final    Eos 01/13/2024 0  Not Estab. % Final    Basos 01/13/2024 0  Not Estab. % Final    Neutrophils (Absolute) 01/13/2024 5.7  1.4 - 7.0 x10E3/uL Final    Lymphs (Absolute) 01/13/2024 1.5  0.7 - 3.1 x10E3/uL Final    Monocytes(Absolute) 01/13/2024 0.8  0.1 - 0.9 x10E3/uL Final    Eos (Absolute) 01/13/2024 0.0  0.0 - 0.4 x10E3/uL Final    Baso (Absolute) 01/13/2024 0.0  0.0 - 0.2 x10E3/uL Final    Immature Granulocytes 01/13/2024 0  Not Estab. % Final    Immature Grans (Abs) 01/13/2024 0.0  0.0 - 0.1 x10E3/uL Final    Glucose 01/13/2024 144 (H)  70 - 99 mg/dL Final    BUN 01/13/2024 15  8 - 27 mg/dL Final    Creatinine 01/13/2024 1.27  0.76 - 1.27 mg/dL Final    eGFR 01/13/2024 63  >59 mL/min/1.73 Final    BUN/Creatinine Ratio 01/13/2024 12  10 - 24 Final    Sodium 01/13/2024 141  134 - 144 mmol/L Final    Potassium 01/13/2024 5.2  3.5 - 5.2 mmol/L Final    Chloride 01/13/2024 95 (L)  96 - 106 mmol/L Final    CO2 01/13/2024 27  20 - 29 mmol/L Final    Calcium 01/13/2024 9.6  8.6 - 10.2 mg/dL Final    Protein, Total 01/13/2024 6.8  6.0 - 8.5 g/dL Final    Albumin 01/13/2024 4.5  3.9 - 4.9 g/dL Final    Globulin, Total 01/13/2024 2.3  1.5 - 4.5 g/dL Final    Albumin/Globulin Ratio 01/13/2024 2.0  1.2 - 2.2 Final    Total Bilirubin 01/13/2024 0.4  0.0 - 1.2 mg/dL Final    Alkaline Phosphatase 01/13/2024 104  44 - 121 IU/L Final    AST 01/13/2024 24  0 - 40 IU/L Final    ALT 01/13/2024 27  0 - 44 IU/L Final    Cholesterol 01/13/2024 179  100 - 199 mg/dL Final    Triglycerides 01/13/2024 81  0 - 149 mg/dL Final    HDL 01/13/2024 48  >39 mg/dL Final    VLDL Cholesterol Rikki 01/13/2024 15  5 - 40 mg/dL Final    LDL Calculated 01/13/2024 116 (H)  0 - 99 mg/dL Final    TSH 01/13/2024 4.130  0.450 - 4.500 uIU/mL Final       Past Medical History:   Diagnosis Date    Coronary artery disease     Diabetes mellitus 07/1922    pt on metformin in past, states DM resolved so no longer taking    Hypertension     MI (myocardial  infarction)      Social History     Socioeconomic History    Marital status: Significant Other   Tobacco Use    Smoking status: Former     Passive exposure: Past    Smokeless tobacco: Never   Substance and Sexual Activity    Alcohol use: Yes     Comment: Occasionally    Drug use: Never    Sexual activity: Yes     Partners: Female     Birth control/protection: Coitus interruptus     Past Surgical History:   Procedure Laterality Date    cardiac stents      x 7    SHOULDER SURGERY Right 1975     History reviewed. No pertinent family history.    Review of patient's allergies indicates:  No Known Allergies    Current Outpatient Medications:     albuterol (PROVENTIL/VENTOLIN HFA) 90 mcg/actuation inhaler, 2 puffs every 4 hours as needed for cough, wheeze, or shortness of breath, Disp: 18 g, Rfl: 11    aspirin (ECOTRIN) 81 MG EC tablet, Aspir-81 mg tablet,delayed release  Take 1 tablet every day by oral route., Disp: , Rfl:     azelastine (ASTELIN) 137 mcg (0.1 %) nasal spray, 2 sprays (274 mcg total) by Nasal route 2 (two) times daily., Disp: 30 mL, Rfl: 11    buPROPion (WELLBUTRIN XL) 150 MG TB24 tablet, Take 1 tablet (150 mg total) by mouth 2 (two) times daily., Disp: 60 tablet, Rfl: 5    buPROPion (WELLBUTRIN XL) 150 MG TB24 tablet, take 1 tablet by mouth twice daily, Disp: 60 tablet, Rfl: 4    fluticasone propionate (FLONASE) 50 mcg/actuation nasal spray, 1 spray (50 mcg total) by Each Nostril route once daily., Disp: 16 g, Rfl: 11    fluticasone-umeclidin-vilanter (TRELEGY ELLIPTA) 200-62.5-25 mcg inhaler, inhale 1 puff by mouth into the lungs once daily, Disp: 180 each, Rfl: 3    losartan-hydrochlorothiazide 50-12.5 mg (HYZAAR) 50-12.5 mg per tablet, Take 1 tablet by mouth once daily., Disp: 180 tablet, Rfl: 1    metoprolol succinate (TOPROL-XL) 25 MG 24 hr tablet, Take 1 tablet (25 mg total) by mouth nightly., Disp: 90 tablet, Rfl: 1    pantoprazole (PROTONIX) 40 MG tablet, Take 1 tablet (40 mg total) by mouth once  "daily., Disp: 30 tablet, Rfl: 11    predniSONE (DELTASONE) 20 MG tablet, Take one daily for 3 days and may repeat for shortness of breath., Disp: 12 tablet, Rfl: 2    rosuvastatin (CRESTOR) 40 MG Tab, Take 1 tablet (40 mg total) by mouth every evening., Disp: 30 tablet, Rfl: 0    semaglutide (OZEMPIC) 0.25 mg or 0.5 mg (2 mg/3 mL) pen injector, Inject 0.5 mg into the skin every 7 days., Disp: 3 mL, Rfl: 5    semaglutide (OZEMPIC) 0.25 mg or 0.5 mg (2 mg/3 mL) pen injector, Inject 0.5 mg into the skin every 7 days., Disp: 3 mL, Rfl: 5    tadalafiL (CIALIS) 20 MG Tab, take 1 tablet by mouth every other day, Disp: 40 tablet, Rfl: 4    tamsulosin (FLOMAX) 0.4 mg Cap, take 1 capsule by mouth once daily, Disp: 90 capsule, Rfl: 4    Review of Systems   Constitutional:  Negative for fatigue, fever and unexpected weight change.   HENT:  Negative for ear pain, sinus pressure and sore throat.    Eyes:  Negative for pain.   Respiratory:  Negative for cough and shortness of breath.    Cardiovascular:  Negative for chest pain and leg swelling.   Gastrointestinal:  Negative for abdominal pain, constipation, nausea and vomiting.   Genitourinary:  Negative for dysuria, frequency and urgency.   Musculoskeletal:  Negative for arthralgias.   Skin:  Negative for rash.   Neurological:  Negative for dizziness, weakness and headaches.   Psychiatric/Behavioral:  Negative for sleep disturbance.           Objective:      Vitals:    01/09/24 1107   BP: 136/70   Pulse: 68   SpO2: 98%   Weight: (!) 139.3 kg (307 lb)   Height: 6' 4" (1.93 m)     Physical Exam  Vitals and nursing note reviewed.   Constitutional:       General: He is not in acute distress.     Appearance: Normal appearance. He is well-developed. He is obese.   HENT:      Head: Normocephalic and atraumatic.      Right Ear: External ear normal.      Left Ear: External ear normal.   Eyes:      Pupils: Pupils are equal, round, and reactive to light.   Cardiovascular:      Rate and " Rhythm: Normal rate and regular rhythm.      Pulses:           Dorsalis pedis pulses are 2+ on the right side.        Posterior tibial pulses are 2+ on the right side.      Heart sounds: Normal heart sounds.   Pulmonary:      Effort: Pulmonary effort is normal.      Breath sounds: Normal breath sounds.   Abdominal:      General: Bowel sounds are normal.      Palpations: Abdomen is soft.   Musculoskeletal:         General: No deformity. Normal range of motion.      Cervical back: Normal range of motion and neck supple.      Right foot: Normal range of motion. No deformity.   Feet:      Right foot:      Protective Sensation: 5 sites tested.  5 sites sensed.      Left foot:      Protective Sensation: 5 sites tested.  5 sites sensed.      Skin integrity: No skin breakdown.   Lymphadenopathy:      Cervical: No cervical adenopathy.   Skin:     General: Skin is warm and dry.   Neurological:      Mental Status: He is alert and oriented to person, place, and time.   Psychiatric:         Behavior: Behavior normal.           Assessment:       1. Class 2 severe obesity due to excess calories with serious comorbidity in adult, unspecified BMI    2. Primary hypertension    3. Coronary artery disease involving native coronary artery of native heart without angina pectoris    4. Obstructive sleep apnea syndrome    5. Hyperlipidemia, unspecified hyperlipidemia type    6. High risk medication use    7. Type 2 diabetes mellitus with hyperglycemia, without long-term current use of insulin    8. Coronary artery disease, unspecified vessel or lesion type, unspecified whether angina present, unspecified whether native or transplanted heart         Plan:       Class 2 severe obesity due to excess calories with serious comorbidity in adult, unspecified BMI  Comments:  diet adn exercise    Primary hypertension  Comments:  bp is well controlled    Coronary artery disease involving native coronary artery of native heart without angina  pectoris    Obstructive sleep apnea syndrome  Comments:  cpap    Hyperlipidemia, unspecified hyperlipidemia type  -     Lipid Panel; Future; Expected date: 01/09/2024    High risk medication use    Type 2 diabetes mellitus with hyperglycemia, without long-term current use of insulin  Comments:  increase ozempic to 0,5  Orders:  -     semaglutide (OZEMPIC) 0.25 mg or 0.5 mg (2 mg/3 mL) pen injector; Inject 0.5 mg into the skin every 7 days.  Dispense: 3 mL; Refill: 5    Coronary artery disease, unspecified vessel or lesion type, unspecified whether angina present, unspecified whether native or transplanted heart      Follow up in about 3 months (around 4/9/2024), or if symptoms worsen or fail to improve, for medication management.        1/28/2024 Melissa Thurston

## 2024-01-14 LAB
ALBUMIN SERPL-MCNC: 4.5 G/DL (ref 3.9–4.9)
ALBUMIN/GLOB SERPL: 2 {RATIO} (ref 1.2–2.2)
ALP SERPL-CCNC: 104 IU/L (ref 44–121)
ALT SERPL-CCNC: 27 IU/L (ref 0–44)
AST SERPL-CCNC: 24 IU/L (ref 0–40)
BASOPHILS # BLD AUTO: 0 X10E3/UL (ref 0–0.2)
BASOPHILS NFR BLD AUTO: 0 %
BILIRUB SERPL-MCNC: 0.4 MG/DL (ref 0–1.2)
BUN SERPL-MCNC: 15 MG/DL (ref 8–27)
BUN/CREAT SERPL: 12 (ref 10–24)
CALCIUM SERPL-MCNC: 9.6 MG/DL (ref 8.6–10.2)
CHLORIDE SERPL-SCNC: 95 MMOL/L (ref 96–106)
CHOLEST SERPL-MCNC: 179 MG/DL (ref 100–199)
CO2 SERPL-SCNC: 27 MMOL/L (ref 20–29)
CREAT SERPL-MCNC: 1.27 MG/DL (ref 0.76–1.27)
EOSINOPHIL # BLD AUTO: 0 X10E3/UL (ref 0–0.4)
EOSINOPHIL NFR BLD AUTO: 0 %
ERYTHROCYTE [DISTWIDTH] IN BLOOD BY AUTOMATED COUNT: 13.9 % (ref 11.6–15.4)
EST. GFR  (NO RACE VARIABLE): 63 ML/MIN/1.73
GLOBULIN SER CALC-MCNC: 2.3 G/DL (ref 1.5–4.5)
GLUCOSE SERPL-MCNC: 144 MG/DL (ref 70–99)
HCT VFR BLD AUTO: 52.7 % (ref 37.5–51)
HDLC SERPL-MCNC: 48 MG/DL
HGB BLD-MCNC: 17.4 G/DL (ref 13–17.7)
IMM GRANULOCYTES # BLD AUTO: 0 X10E3/UL (ref 0–0.1)
IMM GRANULOCYTES NFR BLD AUTO: 0 %
LDLC SERPL CALC-MCNC: 116 MG/DL (ref 0–99)
LYMPHOCYTES # BLD AUTO: 1.5 X10E3/UL (ref 0.7–3.1)
LYMPHOCYTES NFR BLD AUTO: 18 %
MCH RBC QN AUTO: 28.4 PG (ref 26.6–33)
MCHC RBC AUTO-ENTMCNC: 33 G/DL (ref 31.5–35.7)
MCV RBC AUTO: 86 FL (ref 79–97)
MONOCYTES # BLD AUTO: 0.8 X10E3/UL (ref 0.1–0.9)
MONOCYTES NFR BLD AUTO: 10 %
NEUTROPHILS # BLD AUTO: 5.7 X10E3/UL (ref 1.4–7)
NEUTROPHILS NFR BLD AUTO: 72 %
PLATELET # BLD AUTO: 198 X10E3/UL (ref 150–450)
POTASSIUM SERPL-SCNC: 5.2 MMOL/L (ref 3.5–5.2)
PROT SERPL-MCNC: 6.8 G/DL (ref 6–8.5)
RBC # BLD AUTO: 6.12 X10E6/UL (ref 4.14–5.8)
SODIUM SERPL-SCNC: 141 MMOL/L (ref 134–144)
TRIGL SERPL-MCNC: 81 MG/DL (ref 0–149)
TSH SERPL DL<=0.005 MIU/L-ACNC: 4.13 UIU/ML (ref 0.45–4.5)
VLDLC SERPL CALC-MCNC: 15 MG/DL (ref 5–40)
WBC # BLD AUTO: 8 X10E3/UL (ref 3.4–10.8)

## 2024-01-18 ENCOUNTER — TELEPHONE (OUTPATIENT)
Dept: FAMILY MEDICINE | Facility: CLINIC | Age: 64
End: 2024-01-18
Payer: COMMERCIAL

## 2024-01-18 NOTE — TELEPHONE ENCOUNTER
----- Message from Melissa Thurston NP sent at 1/17/2024 11:58 PM CST -----  Cholesterol has improved. Continue to work on diet to improve sugar. Rest of labs are stable.

## 2024-01-26 ENCOUNTER — PATIENT MESSAGE (OUTPATIENT)
Dept: FAMILY MEDICINE | Facility: CLINIC | Age: 64
End: 2024-01-26
Payer: COMMERCIAL

## 2024-01-26 DIAGNOSIS — I10 HYPERTENSION, UNSPECIFIED TYPE: ICD-10-CM

## 2024-01-26 RX ORDER — METOPROLOL SUCCINATE 25 MG/1
25 TABLET, EXTENDED RELEASE ORAL NIGHTLY
Qty: 90 TABLET | Refills: 1 | Status: SHIPPED | OUTPATIENT
Start: 2024-01-26

## 2024-02-03 DIAGNOSIS — E78.5 HYPERLIPIDEMIA, UNSPECIFIED HYPERLIPIDEMIA TYPE: ICD-10-CM

## 2024-02-05 RX ORDER — ROSUVASTATIN CALCIUM 40 MG/1
40 TABLET, COATED ORAL NIGHTLY
Qty: 30 TABLET | Refills: 0 | Status: SHIPPED | OUTPATIENT
Start: 2024-02-05 | End: 2024-03-29 | Stop reason: SDUPTHER

## 2024-02-05 RX ORDER — BUPROPION HYDROCHLORIDE 150 MG/1
TABLET ORAL
Qty: 60 TABLET | Refills: 4 | OUTPATIENT
Start: 2024-02-05

## 2024-02-07 ENCOUNTER — OFFICE VISIT (OUTPATIENT)
Dept: PULMONOLOGY | Facility: CLINIC | Age: 64
End: 2024-02-07
Payer: COMMERCIAL

## 2024-02-07 VITALS
DIASTOLIC BLOOD PRESSURE: 71 MMHG | HEIGHT: 76 IN | OXYGEN SATURATION: 97 % | HEART RATE: 72 BPM | WEIGHT: 308.06 LBS | BODY MASS INDEX: 37.51 KG/M2 | SYSTOLIC BLOOD PRESSURE: 110 MMHG

## 2024-02-07 DIAGNOSIS — J45.50 SEVERE PERSISTENT ASTHMA, UNCOMPLICATED: Primary | ICD-10-CM

## 2024-02-07 DIAGNOSIS — J45.909 STEROID-DEPENDENT ASTHMA, UNSPECIFIED ASTHMA SEVERITY, UNSPECIFIED WHETHER COMPLICATED, UNSPECIFIED WHETHER PERSISTENT: ICD-10-CM

## 2024-02-07 DIAGNOSIS — R09.89 CHRONIC SINUS COMPLAINTS: ICD-10-CM

## 2024-02-07 DIAGNOSIS — G47.33 OBSTRUCTIVE SLEEP APNEA: ICD-10-CM

## 2024-02-07 PROCEDURE — 99999 PR PBB SHADOW E&M-EST. PATIENT-LVL IV: CPT | Mod: PBBFAC,,, | Performed by: INTERNAL MEDICINE

## 2024-02-07 PROCEDURE — 3078F DIAST BP <80 MM HG: CPT | Mod: CPTII,S$GLB,, | Performed by: INTERNAL MEDICINE

## 2024-02-07 PROCEDURE — 1159F MED LIST DOCD IN RCRD: CPT | Mod: CPTII,S$GLB,, | Performed by: INTERNAL MEDICINE

## 2024-02-07 PROCEDURE — 3074F SYST BP LT 130 MM HG: CPT | Mod: CPTII,S$GLB,, | Performed by: INTERNAL MEDICINE

## 2024-02-07 PROCEDURE — 3008F BODY MASS INDEX DOCD: CPT | Mod: CPTII,S$GLB,, | Performed by: INTERNAL MEDICINE

## 2024-02-07 PROCEDURE — 99214 OFFICE O/P EST MOD 30 MIN: CPT | Mod: S$GLB,,, | Performed by: INTERNAL MEDICINE

## 2024-02-07 RX ORDER — TEZEPELUMAB-EKKO 210 MG/1.9ML
210 INJECTION, SOLUTION SUBCUTANEOUS
Qty: 1.91 ML | Refills: 11 | Status: ACTIVE | OUTPATIENT
Start: 2024-03-06
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
## 2024-02-07 NOTE — PATIENT INSTRUCTIONS
Ct head viewed today.           You have severe steroid dependant asthma.    You have needed prednisone twice since seen 12/2023.     You relate without steroids -- breathing much worse.  Breathing can only be well sustained with prednisone.      Would recommend tezspire for severe persistent asthma-- dose once every 28 days.      Tezspire 210 mg dose right mid abd wall sub q-- lot [id number] exp 12/31/2025    Will add airsupra 2 every 4 hrs as needed.   We discuss     May  use prednisone as before      May need ent follow up for better cpap.    May use 4 way nasal  type spray and wear cpap mask loosely during day...

## 2024-02-09 RX ORDER — BUPROPION HYDROCHLORIDE 150 MG/1
TABLET ORAL
Qty: 60 TABLET | Refills: 4 | OUTPATIENT
Start: 2024-02-09

## 2024-02-11 DIAGNOSIS — F32.A DEPRESSIVE DISORDER: ICD-10-CM

## 2024-02-12 RX ORDER — BUPROPION HYDROCHLORIDE 150 MG/1
150 TABLET ORAL 2 TIMES DAILY
Qty: 60 TABLET | Refills: 5 | Status: SHIPPED | OUTPATIENT
Start: 2024-02-12

## 2024-02-16 PROBLEM — J45.50 SEVERE PERSISTENT ASTHMA WITHOUT COMPLICATION: Status: ACTIVE | Noted: 2024-02-16

## 2024-02-26 DIAGNOSIS — E78.5 HYPERLIPIDEMIA, UNSPECIFIED HYPERLIPIDEMIA TYPE: ICD-10-CM

## 2024-02-27 RX ORDER — ROSUVASTATIN CALCIUM 40 MG/1
40 TABLET, COATED ORAL NIGHTLY
Qty: 30 TABLET | Refills: 0 | OUTPATIENT
Start: 2024-02-27 | End: 2024-03-28

## 2024-03-12 ENCOUNTER — PATIENT MESSAGE (OUTPATIENT)
Dept: ADMINISTRATIVE | Facility: HOSPITAL | Age: 64
End: 2024-03-12
Payer: COMMERCIAL

## 2024-03-28 ENCOUNTER — PATIENT MESSAGE (OUTPATIENT)
Dept: ADMINISTRATIVE | Facility: HOSPITAL | Age: 64
End: 2024-03-28
Payer: COMMERCIAL

## 2024-03-29 DIAGNOSIS — E78.5 HYPERLIPIDEMIA, UNSPECIFIED HYPERLIPIDEMIA TYPE: ICD-10-CM

## 2024-04-01 ENCOUNTER — PATIENT MESSAGE (OUTPATIENT)
Dept: FAMILY MEDICINE | Facility: CLINIC | Age: 64
End: 2024-04-01
Payer: COMMERCIAL

## 2024-04-01 RX ORDER — ROSUVASTATIN CALCIUM 40 MG/1
40 TABLET, COATED ORAL NIGHTLY
Qty: 90 TABLET | Refills: 0 | Status: SHIPPED | OUTPATIENT
Start: 2024-04-01 | End: 2024-04-14 | Stop reason: SDUPTHER

## 2024-04-02 ENCOUNTER — OFFICE VISIT (OUTPATIENT)
Dept: FAMILY MEDICINE | Facility: CLINIC | Age: 64
End: 2024-04-02
Payer: COMMERCIAL

## 2024-04-02 VITALS
BODY MASS INDEX: 38.17 KG/M2 | SYSTOLIC BLOOD PRESSURE: 98 MMHG | HEART RATE: 79 BPM | DIASTOLIC BLOOD PRESSURE: 70 MMHG | OXYGEN SATURATION: 95 % | TEMPERATURE: 99 F | WEIGHT: 307 LBS | HEIGHT: 75 IN

## 2024-04-02 DIAGNOSIS — J32.9 SINUSITIS, UNSPECIFIED CHRONICITY, UNSPECIFIED LOCATION: ICD-10-CM

## 2024-04-02 DIAGNOSIS — J02.9 SORE THROAT: Primary | ICD-10-CM

## 2024-04-02 DIAGNOSIS — I10 PRIMARY HYPERTENSION: ICD-10-CM

## 2024-04-02 DIAGNOSIS — E11.65 TYPE 2 DIABETES MELLITUS WITH HYPERGLYCEMIA, WITHOUT LONG-TERM CURRENT USE OF INSULIN: ICD-10-CM

## 2024-04-02 DIAGNOSIS — R09.81 NASAL CONGESTION: ICD-10-CM

## 2024-04-02 DIAGNOSIS — Z12.11 SCREENING FOR COLON CANCER: ICD-10-CM

## 2024-04-02 LAB
CTP QC/QA: YES
MOLECULAR STREP A: NEGATIVE
POC MOLECULAR INFLUENZA A AGN: NEGATIVE
POC MOLECULAR INFLUENZA B AGN: NEGATIVE
SARS-COV-2 RDRP RESP QL NAA+PROBE: NEGATIVE

## 2024-04-02 PROCEDURE — 3078F DIAST BP <80 MM HG: CPT | Mod: CPTII,S$GLB,, | Performed by: NURSE PRACTITIONER

## 2024-04-02 PROCEDURE — 87651 STREP A DNA AMP PROBE: CPT | Mod: QW,,, | Performed by: NURSE PRACTITIONER

## 2024-04-02 PROCEDURE — 1159F MED LIST DOCD IN RCRD: CPT | Mod: CPTII,S$GLB,, | Performed by: NURSE PRACTITIONER

## 2024-04-02 PROCEDURE — 3074F SYST BP LT 130 MM HG: CPT | Mod: CPTII,S$GLB,, | Performed by: NURSE PRACTITIONER

## 2024-04-02 PROCEDURE — 3008F BODY MASS INDEX DOCD: CPT | Mod: CPTII,S$GLB,, | Performed by: NURSE PRACTITIONER

## 2024-04-02 PROCEDURE — 99214 OFFICE O/P EST MOD 30 MIN: CPT | Mod: S$GLB,,, | Performed by: NURSE PRACTITIONER

## 2024-04-02 PROCEDURE — 87635 SARS-COV-2 COVID-19 AMP PRB: CPT | Mod: QW,S$GLB,, | Performed by: NURSE PRACTITIONER

## 2024-04-02 PROCEDURE — 87502 INFLUENZA DNA AMP PROBE: CPT | Mod: QW,,, | Performed by: NURSE PRACTITIONER

## 2024-04-02 PROCEDURE — 1160F RVW MEDS BY RX/DR IN RCRD: CPT | Mod: CPTII,S$GLB,, | Performed by: NURSE PRACTITIONER

## 2024-04-02 RX ORDER — DOXYCYCLINE 100 MG/1
100 CAPSULE ORAL 2 TIMES DAILY
Qty: 20 CAPSULE | Refills: 0 | Status: SHIPPED | OUTPATIENT
Start: 2024-04-02

## 2024-04-02 RX ORDER — BENZONATATE 200 MG/1
200 CAPSULE ORAL 3 TIMES DAILY PRN
Qty: 30 CAPSULE | Refills: 0 | Status: SHIPPED | OUTPATIENT
Start: 2024-04-02 | End: 2024-04-25

## 2024-04-07 NOTE — PROGRESS NOTES
SUBJECTIVE:    Patient ID: Dayday Rodriguez is a 63 y.o. male.    Chief Complaint: Nasal Congestion and Sore Throat (No bottles//Pt here for sore throat, hoarse voice, nasal congestion since Sunday. Feels like going to ear. Did not do home covid test. Not taking anything OTC//[facility]//declines colo//JL)    63 year old male presents for urgent visit.Wife is present during the exam. Treated for depression, cad, hypertension, dm, hyperlipidemia.  seeing dr. Juarez regularly. taking trelegy.  Using cpap nightly.  bp is well controlled . Today is complaining about not feeling well x 3 days. Wife with similar symptoms. Low grade fever. Post nasal drip. Sore throat. Productive cough. Has tried otc meds with only minimal improvement.     Sore Throat   Associated symptoms include congestion and coughing. Pertinent negatives include no ear discharge, ear pain or shortness of breath.       Office Visit on 04/02/2024   Component Date Value Ref Range Status    POC Molecular Influenza A Ag 04/02/2024 Negative  Negative, Not Reported Final    POC Molecular Influenza B Ag 04/02/2024 Negative  Negative, Not Reported Final     Acceptable 04/02/2024 Yes   Final    POC Rapid COVID 04/02/2024 Negative  Negative Final     Acceptable 04/02/2024 Yes   Final    Molecular Strep A, POC 04/02/2024 Negative  Negative Final     Acceptable 04/02/2024 Yes   Final   Hospital Outpatient Visit on 01/03/2024   Component Date Value Ref Range Status    Post FVC 01/03/2024 4.37  4.17 - 6.87 L Preliminary    Post FEV1 01/03/2024 3.75  3.10 - 5.16 L Preliminary    Post FEV1 FVC 01/03/2024 85.82  63.72 - 86.71 % Preliminary    Post FEF 25 75 01/03/2024 5.36  1.53 - 5.54 L/s Preliminary    Post PEF 01/03/2024 10.60  7.67 - 13.13 L/s Preliminary    Post  01/03/2024 7.94  sec Preliminary    Pre DLCO 01/03/2024 25.21 (L)  26.69 - 40.55 ml/(min*mmHg) Preliminary     DLCOVA PRE 01/03/2024 4.24  3.03 - 5.03 ml/(min*mmHg*L) Preliminary    VA PRE 01/03/2024 5.95 (L)  8.19 - 8.19 L Preliminary    IVC PRE 01/03/2024 4.34  4.17 - 6.87 L Preliminary    Pre TLC 01/03/2024 6.12 (L)  7.19 - 9.50 L Preliminary    VC PRE 01/03/2024 4.41  4.17 - 6.87 L Preliminary    Pre FRC PL 01/03/2024 2.84 (L)  3.01 - 4.98 L Preliminary    Pre ERV 01/03/2024 1.13  -73097.69 - 72632.31 L Preliminary    Pre RV 01/03/2024 1.71 (L)  2.01 - 3.36 L Preliminary    RVTLC PRE 01/03/2024 27.90 (L)  29.55 - 47.51 % Preliminary    Raw PRE 01/03/2024 2.96 (L)  3.06 - 3.06 cmH2O*s/L Preliminary    Pre FVC 01/03/2024 4.41  4.17 - 6.87 L Preliminary    Pre FEV1 01/03/2024 3.65  3.10 - 5.16 L Preliminary    Pre FEV1 FVC 01/03/2024 82.77  63.72 - 86.71 % Preliminary    Pre FEF 25 75 01/03/2024 4.48  1.53 - 5.54 L/s Preliminary    Pre PEF 01/03/2024 9.94  7.67 - 13.13 L/s Preliminary    Pre  01/03/2024 9.83  sec Preliminary    Pre MVV 01/03/2024 99.28 (L)  135.45 - 183.25 L/min Preliminary    FVC Ref 01/03/2024 5.51   Preliminary    FVC LLN 01/03/2024 4.17   Preliminary    FVC Pre Ref 01/03/2024 80.1  % Preliminary    FVC Post Ref 01/03/2024 79.4  % Preliminary    FVC Chg 01/03/2024 -0.9  % Preliminary    FEV1 Ref 01/03/2024 4.16   Preliminary    FEV1 LLN 01/03/2024 3.10   Preliminary    FEV1 Pre Ref 01/03/2024 87.7  % Preliminary    FEV1 Post Ref 01/03/2024 90.1  % Preliminary    FEV1 Chg 01/03/2024 2.7  % Preliminary    FEV1 FVC Ref 01/03/2024 76   Preliminary    FEV1 FVC N 01/03/2024 64   Preliminary    FEV1 FVC Pre Ref 01/03/2024 108.9  % Preliminary    FEV1 FVC Post Ref 01/03/2024 112.9  % Preliminary    FEV1 FVC Chg 01/03/2024 3.7  % Preliminary    FEF 25 75 Ref 01/03/2024 3.22   Preliminary    FEF 25 75 [facility] 01/03/2024 1.53   Preliminary    FEF 25 75 Pre Ref 01/03/2024 139.1  % Preliminary    FEF 25 75 Post Ref 01/03/2024 166.3  % Preliminary    FEF 25 75 Chg 01/03/2024 19.5  % Preliminary    PEF Ref  01/03/2024 10.40   Preliminary    PEF LLN 01/03/2024 7.67   Preliminary    PEF Pre Ref 01/03/2024 95.6  % Preliminary    PEF Post Ref 01/03/2024 101.9  % Preliminary    PEF Chg 01/03/2024 6.6  % Preliminary    [id number] Chg 01/03/2024 -19.3  % Preliminary    MVV Ref 01/03/2024 159   Preliminary    MVV LLN 01/03/2024 135   Preliminary    MVV Pre Ref 01/03/2024 62.3  % Preliminary    TLC Ref 01/03/2024 8.34   Preliminary    TLC LLN 01/03/2024 7.19   Preliminary    TLC Pre Ref 01/03/2024 73.3  % Preliminary    VC Ref 01/03/2024 5.51   Preliminary    VC LLN 01/03/2024 4.17   Preliminary    VC Pre Ref 01/03/2024 80.1  % Preliminary    FRCpleth Ref 01/03/2024 3.99   Preliminary    FRCpleth LLN 01/03/2024 3.01   Preliminary    FRCpleth PreRef 01/03/2024 71.1  % Preliminary    ERV Ref 01/03/2024 1.31   Preliminary    ERV LLN 01/03/2024 -27565.69   Preliminary    ERV Pre Ref 01/03/2024 86.5  % Preliminary    RV Ref 01/03/2024 2.68   Preliminary    RV LLN 01/03/2024 2.01   Preliminary    RV Pre Ref 01/03/2024 63.6  % Preliminary    RVTLC Ref 01/03/2024 39   Preliminary    RVTLC LLN 01/03/2024 30   Preliminary    RVTLC Pre Ref 01/03/2024 72.4  % Preliminary    Raw Ref 01/03/2024 3.06   Preliminary    Raw LLN 01/03/2024 3.06   Preliminary    Raw Pre Ref 01/03/2024 96.8  % Preliminary    DLCO Single Breath Ref 01/03/2024 33.62   Preliminary    DLCO Single Breath LLN 01/03/2024 26.69   Preliminary    DLCO Single Breath Pre Ref 01/03/2024 75.0  % Preliminary    DLCOc Single Breath Ref 01/03/2024 33.62   Preliminary    DLCOc Single Breath LLN 01/03/2024 26.69   Preliminary    DLCOVA Ref 01/03/2024 4.03   Preliminary    DLCOVA LLN 01/03/2024 3.03   Preliminary    DLCOVA Pre Ref 01/03/2024 105.1  % Preliminary    DLCOc SBVA Ref 01/03/2024 4.03   Preliminary    DLCOc SBVA LLN 01/03/2024 3.03   Preliminary    VA Single Breath Ref 01/03/2024 8.19   Preliminary    VA Single Breath LLN 01/03/2024 8.19   Preliminary    VA Single Breath Pre  Ref 01/03/2024 72.6  % Preliminary    IVC Single Breath Ref 01/03/2024 5.51   Preliminary    IVC Single Breath LLN 01/03/2024 4.17   Preliminary    IVC Single Breath Pre Ref 01/03/2024 78.8  % Preliminary       Past Medical History:   Diagnosis Date    Coronary artery disease     Diabetes mellitus 07/1922    pt on metformin in past, states DM resolved so no longer taking    Hypertension     MI (myocardial infarction)      Social History     Socioeconomic History    Marital status: Significant Other   Tobacco Use    Smoking status: Former     Passive exposure: Past    Smokeless tobacco: Never   Substance and Sexual Activity    Alcohol use: Yes     Comment: Occasionally    Drug use: Never    Sexual activity: Yes     Partners: Female     Birth control/protection: Coitus interruptus     Past Surgical History:   Procedure Laterality Date    cardiac stents      x 7    SHOULDER SURGERY Right 1975     History reviewed. No pertinent family history.    Tests to Keep You Healthy    Eye Exam: DUE  Colon Cancer Screening: ORDERED  Last Blood Pressure <= 139/89 (4/2/2024): Yes  Last HbA1c < 8 (07/10/2023): Yes      Review of patient's allergies indicates:  No Known Allergies    Current Outpatient Medications:     albuterol (PROVENTIL/VENTOLIN HFA) 90 mcg/actuation inhaler, 2 puffs every 4 hours as needed for cough, wheeze, or shortness of breath, Disp: 18 g, Rfl: 11    albuterol-budesonide (AIRSUPRA) 90-80 mcg/actuation, Inhale 2 puffs into the lungs every 4 (four) hours as needed (short breath)., Disp: 10.7 g, Rfl: 11    aspirin (ECOTRIN) 81 MG EC tablet, Aspir-81 mg tablet,delayed release  Take 1 tablet every day by oral route., Disp: , Rfl:     azelastine (ASTELIN) 137 mcg (0.1 %) nasal spray, 2 sprays (274 mcg total) by Nasal route 2 (two) times daily., Disp: 30 mL, Rfl: 11    benzonatate (TESSALON) 200 MG capsule, Take 1 capsule (200 mg total) by mouth 3 (three) times daily as needed for Cough., Disp: 30 capsule, Rfl: 0     buPROPion (WELLBUTRIN XL) 150 MG TB24 tablet, take 1 tablet by mouth twice daily, Disp: 60 tablet, Rfl: 4    buPROPion (WELLBUTRIN XL) 150 MG TB24 tablet, Take 1 tablet (150 mg total) by mouth 2 (two) times daily., Disp: 60 tablet, Rfl: 5    doxycycline (VIBRAMYCIN) 100 MG Cap, Take 1 capsule (100 mg total) by mouth 2 (two) times daily., Disp: 20 capsule, Rfl: 0    fluticasone propionate (FLONASE) 50 mcg/actuation nasal spray, 1 spray (50 mcg total) by Each Nostril route once daily., Disp: 16 g, Rfl: 11    fluticasone-umeclidin-vilanter (TRELEGY ELLIPTA) 200-62.5-25 mcg inhaler, inhale 1 puff by mouth into the lungs once daily, Disp: 180 each, Rfl: 3    ibuprofen (ADVIL,MOTRIN) 600 MG tablet, Take 1 tablet (600 mg total) by mouth every 6 (six) hours as needed., Disp: 21 tablet, Rfl: 0    losartan-hydrochlorothiazide 50-12.5 mg (HYZAAR) 50-12.5 mg per tablet, Take 1 tablet by mouth once daily., Disp: 180 tablet, Rfl: 1    metoprolol succinate (TOPROL-XL) 25 MG 24 hr tablet, Take 1 tablet (25 mg total) by mouth nightly., Disp: 90 tablet, Rfl: 1    pantoprazole (PROTONIX) 40 MG tablet, Take 1 tablet (40 mg total) by mouth once daily., Disp: 30 tablet, Rfl: 11    predniSONE (DELTASONE) 20 MG tablet, Take one daily for 3 days and may repeat for shortness of breath., Disp: 12 tablet, Rfl: 2    rosuvastatin (CRESTOR) 40 MG Tab, Take 1 tablet (40 mg total) by mouth every evening., Disp: 90 tablet, Rfl: 0    semaglutide (OZEMPIC) 0.25 mg or 0.5 mg (2 mg/3 mL) pen injector, Inject 0.5 mg into the skin every 7 days., Disp: 3 mL, Rfl: 5    semaglutide (OZEMPIC) 0.25 mg or 0.5 mg (2 mg/3 mL) pen injector, Inject 0.5 mg into the skin every 7 days., Disp: 3 mL, Rfl: 5    tadalafiL (CIALIS) 20 MG Tab, take 1 tablet by mouth every other day, Disp: 40 tablet, Rfl: 4    tamsulosin (FLOMAX) 0.4 mg Cap, take 1 capsule by mouth once daily, Disp: 90 capsule, Rfl: 4    tezepelumab-ekko (TEZSPIRE) 210 mg/1.91 mL (110 mg/mL) Sushil, Inject 210  "mg into the skin every 28 days., Disp: 1.91 mL, Rfl: 11    Review of Systems   Constitutional:  Negative for chills and fever.   HENT:  Positive for congestion, sinus pressure and sore throat. Negative for ear discharge and ear pain.    Eyes:  Negative for discharge.   Respiratory:  Positive for cough. Negative for shortness of breath.    Cardiovascular:  Negative for chest pain and leg swelling.          Objective:      Vitals:    04/02/24 1006   BP: 98/70   Pulse: 79   Temp: 98.6 °F (37 °C)   TempSrc: Oral   SpO2: 95%   Weight: (!) 139.3 kg (307 lb)   Height: 6' 3" (1.905 m)     Physical Exam  Vitals and nursing note reviewed.   Constitutional:       General: He is not in acute distress.     Appearance: Normal appearance. He is well-developed. He is not ill-appearing.   HENT:      Right Ear: External ear normal.      Left Ear: External ear normal.      Nose: Congestion and rhinorrhea present.      Mouth/Throat:      Pharynx: Posterior oropharyngeal erythema present.      Tonsils: No tonsillar exudate.   Cardiovascular:      Rate and Rhythm: Normal rate and regular rhythm.      Heart sounds: Normal heart sounds.   Pulmonary:      Breath sounds: Normal breath sounds.   Lymphadenopathy:      Cervical: No cervical adenopathy.   Neurological:      Mental Status: He is alert.           Assessment:       1. Sore throat    2. Nasal congestion    3. Screening for colon cancer    4. Type 2 diabetes mellitus with hyperglycemia, without long-term current use of insulin    5. Primary hypertension    6. Sinusitis, unspecified chronicity, unspecified location         Plan:       Sore throat  -     POCT Influenza A/B Molecular  -     POCT COVID-19 Rapid Screening  -     POCT Strep A, Molecular    Nasal congestion  -     POCT Influenza A/B Molecular  -     POCT COVID-19 Rapid Screening  -     POCT Strep A, Molecular    Screening for colon cancer  Comments:  cologuard order placed  Orders:  -     Cologuard Screening (Multitarget " Stool DNA); Future; Expected date: 04/02/2024    Type 2 diabetes mellitus with hyperglycemia, without long-term current use of insulin  Comments:  6.2. labs as ordered    Primary hypertension  Comments:  bp controlled    Sinusitis, unspecified chronicity, unspecified location  Comments:  doxycycline, tessalon    Other orders  -     doxycycline (VIBRAMYCIN) 100 MG Cap; Take 1 capsule (100 mg total) by mouth 2 (two) times daily.  Dispense: 20 capsule; Refill: 0  -     benzonatate (TESSALON) 200 MG capsule; Take 1 capsule (200 mg total) by mouth 3 (three) times daily as needed for Cough.  Dispense: 30 capsule; Refill: 0      Follow up in about 3 months (around 7/2/2024), or if symptoms worsen or fail to improve, for medication management.        4/6/2024 Melissa Thurston

## 2024-04-14 DIAGNOSIS — E78.5 HYPERLIPIDEMIA, UNSPECIFIED HYPERLIPIDEMIA TYPE: ICD-10-CM

## 2024-04-15 RX ORDER — ROSUVASTATIN CALCIUM 40 MG/1
40 TABLET, COATED ORAL NIGHTLY
Qty: 90 TABLET | Refills: 0 | Status: SHIPPED | OUTPATIENT
Start: 2024-04-15

## 2024-07-18 ENCOUNTER — TELEPHONE (OUTPATIENT)
Dept: FAMILY MEDICINE | Facility: CLINIC | Age: 64
End: 2024-07-18
Payer: COMMERCIAL

## 2024-07-18 DIAGNOSIS — E11.65 TYPE 2 DIABETES MELLITUS WITH HYPERGLYCEMIA, WITHOUT LONG-TERM CURRENT USE OF INSULIN: ICD-10-CM

## 2024-07-18 DIAGNOSIS — Z79.899 ENCOUNTER FOR LONG-TERM (CURRENT) USE OF OTHER MEDICATIONS: ICD-10-CM

## 2024-07-18 DIAGNOSIS — E78.5 HYPERLIPIDEMIA, UNSPECIFIED HYPERLIPIDEMIA TYPE: Primary | ICD-10-CM

## 2024-07-18 DIAGNOSIS — I10 PRIMARY HYPERTENSION: ICD-10-CM

## 2024-07-18 DIAGNOSIS — Z79.899 HIGH RISK MEDICATION USE: ICD-10-CM

## 2024-08-05 ENCOUNTER — PATIENT MESSAGE (OUTPATIENT)
Dept: ADMINISTRATIVE | Facility: OTHER | Age: 64
End: 2024-08-05
Payer: COMMERCIAL

## 2024-08-05 ENCOUNTER — PATIENT MESSAGE (OUTPATIENT)
Dept: FAMILY MEDICINE | Facility: CLINIC | Age: 64
End: 2024-08-05
Payer: COMMERCIAL

## 2024-08-08 ENCOUNTER — OFFICE VISIT (OUTPATIENT)
Dept: FAMILY MEDICINE | Facility: CLINIC | Age: 64
End: 2024-08-08
Payer: COMMERCIAL

## 2024-08-08 VITALS
WEIGHT: 304.13 LBS | DIASTOLIC BLOOD PRESSURE: 72 MMHG | SYSTOLIC BLOOD PRESSURE: 128 MMHG | HEART RATE: 62 BPM | BODY MASS INDEX: 41.19 KG/M2 | OXYGEN SATURATION: 97 % | HEIGHT: 72 IN

## 2024-08-08 DIAGNOSIS — J45.50 SEVERE PERSISTENT ASTHMA WITHOUT COMPLICATION: ICD-10-CM

## 2024-08-08 DIAGNOSIS — H53.9 VISUAL CHANGES: ICD-10-CM

## 2024-08-08 DIAGNOSIS — I10 HYPERTENSION, UNSPECIFIED TYPE: ICD-10-CM

## 2024-08-08 DIAGNOSIS — R42 DIZZINESS AND GIDDINESS: Primary | ICD-10-CM

## 2024-08-08 DIAGNOSIS — G44.001 INTRACTABLE CLUSTER HEADACHE SYNDROME, UNSPECIFIED CHRONICITY PATTERN: ICD-10-CM

## 2024-08-08 DIAGNOSIS — E78.5 HYPERLIPIDEMIA, UNSPECIFIED HYPERLIPIDEMIA TYPE: ICD-10-CM

## 2024-08-08 DIAGNOSIS — Z12.11 SCREENING FOR COLON CANCER: ICD-10-CM

## 2024-08-08 DIAGNOSIS — F32.A DEPRESSIVE DISORDER: ICD-10-CM

## 2024-08-08 DIAGNOSIS — E11.65 TYPE 2 DIABETES MELLITUS WITH HYPERGLYCEMIA, WITHOUT LONG-TERM CURRENT USE OF INSULIN: ICD-10-CM

## 2024-08-08 LAB — HBA1C MFR BLD: 6.4 %

## 2024-08-08 PROCEDURE — 3078F DIAST BP <80 MM HG: CPT | Mod: CPTII,S$GLB,, | Performed by: NURSE PRACTITIONER

## 2024-08-08 PROCEDURE — 3044F HG A1C LEVEL LT 7.0%: CPT | Mod: CPTII,S$GLB,, | Performed by: NURSE PRACTITIONER

## 2024-08-08 PROCEDURE — 83036 HEMOGLOBIN GLYCOSYLATED A1C: CPT | Mod: QW,,, | Performed by: NURSE PRACTITIONER

## 2024-08-08 PROCEDURE — 3074F SYST BP LT 130 MM HG: CPT | Mod: CPTII,S$GLB,, | Performed by: NURSE PRACTITIONER

## 2024-08-08 PROCEDURE — 1159F MED LIST DOCD IN RCRD: CPT | Mod: CPTII,S$GLB,, | Performed by: NURSE PRACTITIONER

## 2024-08-08 PROCEDURE — 99214 OFFICE O/P EST MOD 30 MIN: CPT | Mod: S$GLB,,, | Performed by: NURSE PRACTITIONER

## 2024-08-08 PROCEDURE — 1160F RVW MEDS BY RX/DR IN RCRD: CPT | Mod: CPTII,S$GLB,, | Performed by: NURSE PRACTITIONER

## 2024-08-08 PROCEDURE — 3008F BODY MASS INDEX DOCD: CPT | Mod: CPTII,S$GLB,, | Performed by: NURSE PRACTITIONER

## 2024-08-08 RX ORDER — ROSUVASTATIN CALCIUM 40 MG/1
40 TABLET, COATED ORAL NIGHTLY
Qty: 90 TABLET | Refills: 0 | Status: SHIPPED | OUTPATIENT
Start: 2024-08-08

## 2024-08-08 RX ORDER — BUPROPION HYDROCHLORIDE 150 MG/1
150 TABLET ORAL 2 TIMES DAILY
Qty: 180 TABLET | Refills: 1 | Status: SHIPPED | OUTPATIENT
Start: 2024-08-08

## 2024-08-08 RX ORDER — METOPROLOL SUCCINATE 25 MG/1
25 TABLET, EXTENDED RELEASE ORAL NIGHTLY
Qty: 90 TABLET | Refills: 1 | Status: SHIPPED | OUTPATIENT
Start: 2024-08-08

## 2024-08-08 RX ORDER — SEMAGLUTIDE 0.68 MG/ML
0.5 INJECTION, SOLUTION SUBCUTANEOUS
Qty: 3 ML | Refills: 5 | Status: SHIPPED | OUTPATIENT
Start: 2024-08-08

## 2024-08-08 RX ORDER — TADALAFIL 20 MG/1
TABLET ORAL
Qty: 40 TABLET | Refills: 4 | Status: SHIPPED | OUTPATIENT
Start: 2024-08-08

## 2024-08-08 RX ORDER — LOSARTAN POTASSIUM AND HYDROCHLOROTHIAZIDE 12.5; 5 MG/1; MG/1
1 TABLET ORAL DAILY
Qty: 180 TABLET | Refills: 1 | Status: SHIPPED | OUTPATIENT
Start: 2024-08-08

## 2024-08-14 NOTE — PROGRESS NOTES
SUBJECTIVE:    Patient ID: Dayday Rodriguez is a 63 y.o. male.    Chief Complaint: Headache (No Bottles//refills will//eye exam apt in September [facility]//denies colon//severe headaches x one week//dizzy//-ERL)    63 year old male presents for urgent visit. Wife is present during the exam. Treated for depression, cad, hypertension, dm, hyperlipidemia.  seeing dr. Juraez for asthma. Using cpap nightly.  bp is well controlled in office is well controlled.   Hga1c 6.4mg/dl. today is complaining of dizziness. Seems worse with position change. Has checked bp following episode and was normal. It lasts several seconds then resolves. Has been having headaches as well. Not necessarily simultaneously. Does have visual changes at times with headache. Describes as 10. Has to lay down most of the time for resolution. No history of migraines.     Headache   Associated symptoms include dizziness. Pertinent negatives include no abdominal pain, coughing, ear pain, eye pain, fever, nausea, sinus pressure, sore throat, vomiting or weakness.       Office Visit on 08/08/2024   Component Date Value Ref Range Status    Hemoglobin A1C, POC 08/08/2024 6.4  % Final   Office Visit on 04/02/2024   Component Date Value Ref Range Status    POC Molecular Influenza A Ag 04/02/2024 Negative  Negative, Not Reported Final    POC Molecular Influenza B Ag 04/02/2024 Negative  Negative, Not Reported Final     Acceptable 04/02/2024 Yes   Final    POC Rapid COVID 04/02/2024 Negative  Negative Final     Acceptable 04/02/2024 Yes   Final    Molecular Strep A, POC 04/02/2024 Negative  Negative Final     Acceptable 04/02/2024 Yes   Final       Past Medical History:   Diagnosis Date    Coronary artery disease     Diabetes mellitus 07/1922    pt on metformin in past, states DM resolved so no longer taking    Hypertension     MI (myocardial infarction)      Social History     Socioeconomic History    Marital  status: Significant Other   Tobacco Use    Smoking status: Former     Passive exposure: Past    Smokeless tobacco: Never   Substance and Sexual Activity    Alcohol use: Yes     Comment: Occasionally    Drug use: Never    Sexual activity: Yes     Partners: Female     Birth control/protection: Coitus interruptus     Past Surgical History:   Procedure Laterality Date    cardiac stents      x 7    SHOULDER SURGERY Right 1975     No family history on file.    Tests to Keep You Healthy    Eye Exam: DUE  Colon Cancer Screening: ORDERED  Last Blood Pressure <= 139/89 (8/8/2024): Yes  Last HbA1c < 8 (08/08/2024): Yes      Review of patient's allergies indicates:  No Known Allergies    Current Outpatient Medications:     albuterol (PROVENTIL/VENTOLIN HFA) 90 mcg/actuation inhaler, 2 puffs every 4 hours as needed for cough, wheeze, or shortness of breath, Disp: 18 g, Rfl: 11    albuterol-budesonide (AIRSUPRA) 90-80 mcg/actuation, Inhale 2 puffs into the lungs every 4 (four) hours as needed (short breath)., Disp: 10.7 g, Rfl: 11    azelastine (ASTELIN) 137 mcg (0.1 %) nasal spray, 2 sprays (274 mcg total) by Nasal route 2 (two) times daily., Disp: 30 mL, Rfl: 11    fluticasone propionate (FLONASE) 50 mcg/actuation nasal spray, 1 spray (50 mcg total) by Each Nostril route once daily., Disp: 16 g, Rfl: 11    fluticasone-umeclidin-vilanter (TRELEGY ELLIPTA) 200-62.5-25 mcg inhaler, inhale 1 puff by mouth into the lungs once daily, Disp: 180 each, Rfl: 3    pantoprazole (PROTONIX) 40 MG tablet, Take 1 tablet (40 mg total) by mouth once daily., Disp: 30 tablet, Rfl: 11    predniSONE (DELTASONE) 20 MG tablet, Take one daily for 3 days and may repeat for shortness of breath., Disp: 12 tablet, Rfl: 2    tamsulosin (FLOMAX) 0.4 mg Cap, take 1 capsule by mouth once daily, Disp: 90 capsule, Rfl: 4    tezepelumab-ekko (TEZSPIRE) 210 mg/1.91 mL (110 mg/mL) PnIj, Inject 210 mg into the skin every 28 days., Disp: 1.91 mL, Rfl: 11    buPROPion  (WELLBUTRIN XL) 150 MG TB24 tablet, Take 1 tablet (150 mg total) by mouth 2 (two) times daily., Disp: 180 tablet, Rfl: 1    losartan-hydrochlorothiazide 50-12.5 mg (HYZAAR) 50-12.5 mg per tablet, Take 1 tablet by mouth once daily., Disp: 180 tablet, Rfl: 1    metoprolol succinate (TOPROL-XL) 25 MG 24 hr tablet, Take 1 tablet (25 mg total) by mouth nightly., Disp: 90 tablet, Rfl: 1    rosuvastatin (CRESTOR) 40 MG Tab, Take 1 tablet (40 mg total) by mouth every evening., Disp: 90 tablet, Rfl: 0    semaglutide (OZEMPIC) 0.25 mg or 0.5 mg (2 mg/3 mL) pen injector, Inject 0.5 mg into the skin every 7 days., Disp: 3 mL, Rfl: 5    tadalafiL (CIALIS) 20 MG Tab, take 1 tablet by mouth every other day, Disp: 40 tablet, Rfl: 4    Review of Systems   Constitutional:  Negative for fatigue, fever and unexpected weight change.   HENT:  Negative for ear pain, sinus pressure and sore throat.    Eyes:  Negative for pain.   Respiratory:  Negative for cough and shortness of breath.    Cardiovascular:  Negative for chest pain and leg swelling.   Gastrointestinal:  Negative for abdominal pain, constipation, nausea and vomiting.   Genitourinary:  Negative for dysuria, frequency and urgency.   Musculoskeletal:  Negative for arthralgias.   Skin:  Negative for rash.   Neurological:  Positive for dizziness and headaches. Negative for weakness.   Psychiatric/Behavioral:  Negative for sleep disturbance.           Objective:      Vitals:    08/08/24 0939   BP: 128/72   Pulse: 62   SpO2: 97%   Weight: (!) 138 kg (304 lb 2 oz)   Height: 6' (1.829 m)     Physical Exam  Vitals and nursing note reviewed.   Constitutional:       General: He is not in acute distress.     Appearance: Normal appearance. He is well-developed. He is obese.   HENT:      Head: Normocephalic and atraumatic.      Right Ear: External ear normal.      Left Ear: External ear normal.   Eyes:      Pupils: Pupils are equal, round, and reactive to light.   Neck:      Trachea: No  tracheal deviation.   Cardiovascular:      Rate and Rhythm: Normal rate and regular rhythm.      Heart sounds: No murmur heard.     No friction rub. No gallop.   Pulmonary:      Breath sounds: Normal breath sounds. No stridor. No wheezing or rales.   Abdominal:      Palpations: Abdomen is soft. There is no mass.      Tenderness: There is no abdominal tenderness.   Musculoskeletal:         General: No tenderness or deformity.      Cervical back: Neck supple.   Lymphadenopathy:      Cervical: No cervical adenopathy.   Skin:     General: Skin is warm and dry.   Neurological:      Mental Status: He is alert and oriented to person, place, and time.      Motor: No weakness.      Coordination: Coordination normal.      Gait: Gait is intact.   Psychiatric:         Thought Content: Thought content normal.           Assessment:       1. Dizziness and giddiness    2. Hyperlipidemia, unspecified hyperlipidemia type    3. Depressive disorder    4. Hypertension, unspecified type    5. Hypertension, unspecified type    6. Type 2 diabetes mellitus with hyperglycemia, without long-term current use of insulin    7. Severe persistent asthma without complication    8. Visual changes    9. Intractable cluster headache syndrome, unspecified chronicity pattern    10. Screening for colon cancer         Plan:       Dizziness and giddiness  Comments:  mri  change positions slowly  Orders:  -     MRI Brain Without Contrast; Future; Expected date: 08/08/2024    Hyperlipidemia, unspecified hyperlipidemia type  Comments:  crestor  Orders:  -     rosuvastatin (CRESTOR) 40 MG Tab; Take 1 tablet (40 mg total) by mouth every evening.  Dispense: 90 tablet; Refill: 0    Depressive disorder  Comments:  wellbutrin  Orders:  -     buPROPion (WELLBUTRIN XL) 150 MG TB24 tablet; Take 1 tablet (150 mg total) by mouth 2 (two) times daily.  Dispense: 180 tablet; Refill: 1    Hypertension, unspecified type  Comments:  monitor bp daily. resume previous meds.  readings to next ov.   Orders:  -     metoprolol succinate (TOPROL-XL) 25 MG 24 hr tablet; Take 1 tablet (25 mg total) by mouth nightly.  Dispense: 90 tablet; Refill: 1  -     losartan-hydrochlorothiazide 50-12.5 mg (HYZAAR) 50-12.5 mg per tablet; Take 1 tablet by mouth once daily.  Dispense: 180 tablet; Refill: 1    Hypertension, unspecified type  -     metoprolol succinate (TOPROL-XL) 25 MG 24 hr tablet; Take 1 tablet (25 mg total) by mouth nightly.  Dispense: 90 tablet; Refill: 1  -     losartan-hydrochlorothiazide 50-12.5 mg (HYZAAR) 50-12.5 mg per tablet; Take 1 tablet by mouth once daily.  Dispense: 180 tablet; Refill: 1    Type 2 diabetes mellitus with hyperglycemia, without long-term current use of insulin  Comments:  continue ozempic 6.4  Orders:  -     semaglutide (OZEMPIC) 0.25 mg or 0.5 mg (2 mg/3 mL) pen injector; Inject 0.5 mg into the skin every 7 days.  Dispense: 3 mL; Refill: 5  -     Hemoglobin A1C, POCT    Severe persistent asthma without complication  Comments:  followed by dr. flores    Visual changes  Comments:  mri    Intractable cluster headache syndrome, unspecified chronicity pattern  Comments:  mri    Screening for colon cancer  -     Cologuard Screening (Multitarget Stool DNA); Future; Expected date: 08/14/2024    Other orders  -     tadalafiL (CIALIS) 20 MG Tab; take 1 tablet by mouth every other day  Dispense: 40 tablet; Refill: 4      Follow up in about 4 months (around 12/8/2024), or if symptoms worsen or fail to improve, for medication management.        8/14/2024 Melissa Thurston

## 2024-08-15 ENCOUNTER — HOSPITAL ENCOUNTER (OUTPATIENT)
Dept: RADIOLOGY | Facility: HOSPITAL | Age: 64
Discharge: HOME | End: 2024-08-15
Attending: NURSE PRACTITIONER
Payer: COMMERCIAL

## 2024-08-15 DIAGNOSIS — R42 DIZZINESS AND GIDDINESS: ICD-10-CM

## 2024-08-15 PROCEDURE — 70551 MRI BRAIN STEM W/O DYE: CPT | Mod: 26,,, | Performed by: RADIOLOGY

## 2024-08-15 PROCEDURE — 70551 MRI BRAIN STEM W/O DYE: CPT | Mod: TC,PO

## 2024-09-03 ENCOUNTER — PATIENT MESSAGE (OUTPATIENT)
Dept: ADMINISTRATIVE | Facility: HOSPITAL | Age: 64
End: 2024-09-03
Payer: COMMERCIAL

## 2024-09-12 LAB
LEFT EYE DM RETINOPATHY: NEGATIVE
RIGHT EYE DM RETINOPATHY: NEGATIVE

## 2024-10-08 ENCOUNTER — PATIENT MESSAGE (OUTPATIENT)
Dept: FAMILY MEDICINE | Facility: CLINIC | Age: 64
End: 2024-10-08
Payer: COMMERCIAL

## 2024-10-14 ENCOUNTER — PATIENT OUTREACH (OUTPATIENT)
Dept: ADMINISTRATIVE | Facility: HOSPITAL | Age: 64
End: 2024-10-14
Payer: COMMERCIAL

## 2024-10-14 NOTE — PROGRESS NOTES
Population Health Chart Review & Patient Outreach Details      Additional [facility] Health Notes:               Updates Requested / Reviewed:      Updated Care Coordination Note, Care Everywhere, , External Sources: [facility], [facility], and [facility], and Immunizations Reconciliation Completed or Queried: [facility] Topics Overdue:      [facility] Score: 3     Colon Cancer Screening  Urine Screening  Eye Exam    Influenza Vaccine  Pneumonia Vaccine  Tetanus Vaccine  Shingles/Zoster Vaccine  RSV Vaccine                  Health Maintenance Topic(s) Outreach Outcomes & Actions Taken:    Colorectal Cancer Screening - Outreach Outcomes & Actions Taken  : Pt Declined Completing Colorectal Cancer Screening    Eye Exam - Outreach Outcomes & Actions Taken  : External Records Requested & Care Team Updated if Applicable    Lab(s) - Outreach Outcomes & Actions Taken  : Reminder pt portal message sent.

## 2024-10-14 NOTE — LETTER
AUTHORIZATION FOR RELEASE OF   CONFIDENTIAL INFORMATION    Dear Dr. Hernandez,    We are seeing Dayday Rodriguez, date of birth 1960, in the clinic at [facility]. Melissa Thurston NP is the patient's PCP. Dayday Rodriguez has an outstanding lab/procedure at the time we reviewed his chart. In order to help keep his health information updated, he has authorized us to request the following medical record(s):       Diabetic EYE EXAM           Significant Findings:  ________ No Diabetic Retinopathy  ________ Minimal Background Diabetic Retinopathy  ________ Moderate to Severe Background Diabetic Retinopathy  ________ Clinically Significant Macular Edema  ________ Proliferative Diabetic Retinopathy       Please fax records to [facility], Powell, Jodi, NP, [phone] or [phone]     If you have any questions, please contact     Bang RUBALCAVA  Clinical Care Coordinator    [facility]  [phone] (Phone)  [phone] (Fax)    Patient Name: Dayday Rodriguez  : 1960  Patient Phone #: [phone]                   Dayday Rodriguez  MRN: [id number]  : 1960  Age: 63 y.o.  Sex: male         Patient/Legal Guardian Signature  This signature was collected at 2023    Dayday Rodriguez     Self  _______________________________   Printed Name/Relationship to Patient      Consent for Examination and Treatment: I hereby authorize the providers and employees of [facility] ([facility]) to provide medical treatment/services which includes, but is not limited to, performing and administering tests and diagnostic procedures that are deemed necessary, including, but not limited to, imaging examinations, blood tests and other laboratory procedures as may be required by the hospital, clinic, or may be ordered by my physician(s) or persons working under the general and/or special instructions of my physician(s).      I understand and agree that this consent covers  all authorized persons, including but not limited to physicians, residents, nurse practitioners, physicians' assistants, specialists, consultants, student nurses, and independently contracted physicians, who are called upon by the physician in charge, to carry out the diagnostic procedures and medical or surgical treatment.     I hereby authorize [facility] to retain or dispose of any specimens or tissue, should there be such remaining from any test or procedure.     I hereby authorize and give consent for [facility] providers and employees to take photographs, images or videotapes of such diagnostic, surgical or treatment procedures of Patient as may be required by [facility] or as may be ordered by a physician. I further acknowledge and agree that [facility] may use cameras or other devices for patient monitoring.     I am aware that the practice of medicine is not an exact science, and I acknowledge that no guarantees have been made to me as to the outcome of any tests, procedures or treatment.     Authorization for Release of Information: I understand that my insurance company and/or their agents may need information necessary to make determinations about payment/reimbursement. I hereby provide authorization to release to all insurance companies, their successors, assignees, other parties with whom they may have contracted, or others acting on their behalf, that are involved with payment for any hospital and/or clinic charges incurred by the patient, any information that they request and deem necessary for payment/reimbursement, and/or quality review.  I further authorize the release of my health information to physicians or other health care practitioners on staff who are involved in my health care now and in the future, and to other health care providers, entities, or institutions for the purpose of my continued care and treatment, including referrals.     REGISTRATION AUTHORIZATION  Form No. 35482 (Rev. 7/13/2022)        Medicare Patient's Certification and Authorization to Release Information and Payment Request:  I certify that the information given by me in applying for payment under Title XVIII of the Social Security Act is correct. I authorize any kern of medical or other information about me to release to the Social SecurityAdministration, or its intermediaries or carriers, any information needed for this or a related Medicare claim. I request that payment of authorized benefits be made on my behalf.     Assignment of Insurance Benefits:   I hereby authorize any and all insurance companies, health plans, defined   benefit plans, health insurers or any entity that is or may be responsible for payment of my medical expenses to pay all hospital and medical benefits now due, and to become due and payable to me under any hospital benefits, sick benefits, injury benefits or any other benefit for services rendered to me, including Major Medical Benefits, direct to [facility] and all independently contracted physicians. I assign any and all rights that I may have against any and all insurance companies, health plans, defined benefit plans, health insurers or any entity that is or may be responsible for payment of my medical expenses, including, but not limited to any right to appeal a denial of a claim, any right to bring any action, lawsuit, administrative proceeding, or other cause of action on my behalf. I specifically assign my right to pursue litigation against any and all insurance companies, health plans, defined benefit plans, health insurers or any entity that is or may be responsible for payment of my medical expenses based upon a refusal to pay charges.            E. Valuables: It is understood and agreed that [facility] is not liable for the damage to or loss of any money, jewelry,   documents, dentures, eye glasses, hearing aids, prosthetics, or other property of value.     F. Computer Equipment: I understand and agree that  should I choose to use computer equipment owned by [facility] or if I choose to access the Internet via [facility]s network, I do so at my own risk. [facility] is not responsible for any damage to my computer equipment or to any damages of any type that might arise from my loss of equipment or data.     G. Acceptance of Financial Responsibility:  I agree that in consideration of the services and   supplies that have been   or will be furnished to the patient, I am hereby obligated to pay all charges made for or on the account of the patient according to the standard rates (in effect at the time the services and supplies are delivered) established by [facility], including its Patient Financial Assistance Policy to the extent it is applicable. I understand that I am responsible for all charges, or portions thereof, not covered by insurance or other sources. Patient refunds will be distributed only after balances at all [facility] facilities are paid.     H. Communication Authorization:  I hereby authorize [facility] and its representatives, along with any billing service   or  who may work on their behalf, to contact me on   my cell phone and/or home phone using pre- recorded messages, artificial voice messages, automatic telephone dialing devices or other computer assisted technology, or by electronic      mail, text messaging, or by any other form of electronic communication. This includes, but is not limited to, appointment reminders, yearly physical exam reminders, preventive care reminders, patient campaigns, welcome calls, and calls about account balances on my account or any account on which I am listed as a guarantor. I understand I have the right to opt out of these communications at any time.      Relationship  Between  Facility and  Provider:      I understand that some, but not all, providers furnishing services to the patient are not employees or agents of [facility]. The patient is under the care and  supervision of his/her attending physician, and it is the responsibility of the facility and its nursing staff to carry out the instructions of such physicians. It is the responsibility of the patient's physician/designee to obtain the patient's informed consent, when required, for medical or surgical treatment, special diagnostic or therapeutic procedures, or hospital services rendered for the patient under the special instructions of the physician/designee.     REGISTRATION AUTHORIZATION  Form No. 29027 (Rev. 7/13/2022)      Notice of Privacy Practices: I acknowledge I have received a copy of [facility]'s Notice of Privacy Practices.     Facility  Directory: I have discussed with the organization my desire to be either included or excluded  in the facility directory in the event of my being an inpatient at an [facility] facility. I understand that if my choice is to opt-out of being identified in the facility directory that the facility will not provide any information about me such as my condition (e.g. fair, stable, etc.) or my location in the facility (e.g., room number, department).     Immunizations: [facility] shares immunization information with state sponsored health departments to help you and your doctor keep track of your immunization records. By signing, you consent to have this information shared with the health department in your state:                                [place] - LINKS ([place] Immunization Network for Kids Statewide)                                [place] - MIIX ([place] Immunization Information eXchange)                                [place] - ImmPRINT (Immunization Patient Registry with Integrated Technology)     TERM: This authorization is valid for this and subsequent care/treatment I receive at [facility] and will remain valid unless/until revoked in writing by me.     [facility]: As used in this document, [facility] means all [facility] owned and managed facilities,  including, but not limited to, all health centers, surgery centers, clinics, urgent care centers, and hospitals.         [facility] complies with applicable Federal civil rights laws and does not discriminate on the basis of race, color, national origin, age, disability, or sex.  ATENCIÓN: si habla fadyañol, tiene a wilson disposición servicios gratuitos de asistencia lingüística. Sindhu al [phone].  CHÚ Ý: N?u b?n nói Ti?ng Vi?t, có các d?ch v? h? tr? ngôn ng? mi?n phí dành cho b?n. G?i s? [phone].        REGISTRATION AUTHORIZATION  Form No. 00362 (Rev. 7/13/2022)

## 2024-10-17 ENCOUNTER — PATIENT OUTREACH (OUTPATIENT)
Dept: ADMINISTRATIVE | Facility: HOSPITAL | Age: 64
End: 2024-10-17
Payer: COMMERCIAL

## 2024-10-17 NOTE — PROGRESS NOTES
Population Health Chart Review & Patient Outreach Details      Additional Pop Health Notes:               Updates Requested / Reviewed:      Updated Care Coordination Note and          Health Maintenance Topics Overdue:      VBHM Score: 3     Colon Cancer Screening  Urine Screening  Eye Exam    Influenza Vaccine  Pneumonia Vaccine  Tetanus Vaccine  Shingles/Zoster Vaccine  RSV Vaccine                  Health Maintenance Topic(s) Outreach Outcomes & Actions Taken:    Eye Exam - Outreach Outcomes & Actions Taken  : External Records Requested & Care Team Updated if Applicable

## 2024-11-05 ENCOUNTER — TELEPHONE (OUTPATIENT)
Dept: FAMILY MEDICINE | Facility: CLINIC | Age: 64
End: 2024-11-05
Payer: COMMERCIAL

## 2024-11-17 DIAGNOSIS — E78.5 HYPERLIPIDEMIA, UNSPECIFIED HYPERLIPIDEMIA TYPE: ICD-10-CM

## 2024-11-18 RX ORDER — ROSUVASTATIN CALCIUM 40 MG/1
40 TABLET, COATED ORAL NIGHTLY
Qty: 30 TABLET | Refills: 0 | Status: SHIPPED | OUTPATIENT
Start: 2024-11-18

## 2024-12-18 ENCOUNTER — TELEPHONE (OUTPATIENT)
Dept: FAMILY MEDICINE | Facility: CLINIC | Age: 64
End: 2024-12-18
Payer: COMMERCIAL

## 2024-12-24 DIAGNOSIS — E78.5 HYPERLIPIDEMIA, UNSPECIFIED HYPERLIPIDEMIA TYPE: ICD-10-CM

## 2024-12-24 RX ORDER — ROSUVASTATIN CALCIUM 40 MG/1
40 TABLET, COATED ORAL NIGHTLY
Qty: 30 TABLET | Refills: 0 | OUTPATIENT
Start: 2024-12-24

## 2024-12-30 DIAGNOSIS — J45.50 SEVERE PERSISTENT ASTHMA WITHOUT COMPLICATION: ICD-10-CM

## 2024-12-31 RX ORDER — FLUTICASONE FUROATE, UMECLIDINIUM BROMIDE AND VILANTEROL TRIFENATATE 200; 62.5; 25 UG/1; UG/1; UG/1
POWDER RESPIRATORY (INHALATION)
Qty: 180 EACH | Refills: 3 | Status: SHIPPED | OUTPATIENT
Start: 2024-12-31

## 2025-01-23 DIAGNOSIS — R06.6 HICCUPS: ICD-10-CM

## 2025-01-24 RX ORDER — PANTOPRAZOLE SODIUM 40 MG/1
40 TABLET, DELAYED RELEASE ORAL DAILY
Qty: 30 TABLET | Refills: 11 | OUTPATIENT
Start: 2025-01-24 | End: 2026-01-24

## 2025-01-28 DIAGNOSIS — J45.50 SEVERE PERSISTENT ASTHMA, UNCOMPLICATED: ICD-10-CM

## 2025-01-28 RX ORDER — TEZEPELUMAB-EKKO 210 MG/1.9ML
210 INJECTION, SOLUTION SUBCUTANEOUS
Qty: 1.91 ML | Refills: 11 | Status: SHIPPED | OUTPATIENT
Start: 2025-01-28 | End: 2025-01-29 | Stop reason: SDUPTHER

## 2025-01-29 DIAGNOSIS — J45.50 SEVERE PERSISTENT ASTHMA, UNCOMPLICATED: ICD-10-CM

## 2025-01-29 RX ORDER — TEZEPELUMAB-EKKO 210 MG/1.9ML
210 INJECTION, SOLUTION SUBCUTANEOUS
Qty: 1.91 ML | Refills: 11 | Status: SHIPPED | OUTPATIENT
Start: 2025-01-29

## 2025-02-07 ENCOUNTER — PATIENT MESSAGE (OUTPATIENT)
Dept: PULMONOLOGY | Facility: CLINIC | Age: 65
End: 2025-02-07
Payer: COMMERCIAL

## 2025-02-07 ENCOUNTER — TELEPHONE (OUTPATIENT)
Dept: PULMONOLOGY | Facility: CLINIC | Age: 65
End: 2025-02-07
Payer: COMMERCIAL

## 2025-02-07 NOTE — TELEPHONE ENCOUNTER
Sent patient a portal message regarding getting scheduled for a follow up visit with Dr. Juarez .     ----- Message from Pharmacist Sindhu sent at 2025  5:29 PM CST -----  Regarding: Tezspire  Good evening Dr. Juarez and Staff,    The patient's Tezspire PA will  on 25. For the reauthorization the insurance is requiring documentation of positive clinical response while on therapy of the requested medication as demonstrated by at least ONE of the following? (1) Reduction in the frequency of exacerbations, (2) Decreased utilization of rescue medications, (3) Increase in percent predicted FEV1 from pretreatment baseline, (4) Reduction in severity or frequency of asthma-related symptoms (for example; wheezing; shortness of breath, coughing, etc.).     Please provide documentation as soon as possible.    Sindhu Romano, PharmD  Clinical Pharmacist  [facility]  (P): [phone]  (F): [phone]

## 2025-02-07 NOTE — TELEPHONE ENCOUNTER
----- Message from Biju Juarez MD sent at 2025  6:30 PM CST -----  Regarding: RE: Tezspire  Notify patient we need to get a below documented-virtual visit should be adequate.  Arrange  ----- Message -----  From: Sindhu Long PharmD  Sent: 2025   5:31 PM CST  To: Biju Juarez MD; Larry WANG Staff  Subject: Tezspire                                         Good evening Dr. Juarez and Staff,    The patient's Tezspire PA will  on 25. For the reauthorization the insurance is requiring documentation of positive clinical response while on therapy of the requested medication as demonstrated by at least ONE of the following? (1) Reduction in the frequency of exacerbations, (2) Decreased utilization of rescue medications, (3) Increase in percent predicted FEV1 from pretreatment baseline, (4) Reduction in severity or frequency of asthma-related symptoms (for example; wheezing; shortness of breath, coughing, etc.).     Please provide documentation as soon as possible.    Sindhu Romano, PharmD  Clinical Pharmacist  [facility]  (P): [phone]  (F): [phone]

## 2025-02-10 ENCOUNTER — TELEPHONE (OUTPATIENT)
Dept: PULMONOLOGY | Facility: CLINIC | Age: 65
End: 2025-02-10
Payer: COMMERCIAL

## 2025-02-12 ENCOUNTER — OFFICE VISIT (OUTPATIENT)
Dept: PULMONOLOGY | Facility: CLINIC | Age: 65
End: 2025-02-12
Payer: COMMERCIAL

## 2025-02-12 VITALS — WEIGHT: 295 LBS | HEIGHT: 72 IN | BODY MASS INDEX: 39.96 KG/M2

## 2025-02-12 DIAGNOSIS — R06.09 DOE (DYSPNEA ON EXERTION): ICD-10-CM

## 2025-02-12 DIAGNOSIS — J45.50 SEVERE PERSISTENT ASTHMA WITHOUT COMPLICATION: ICD-10-CM

## 2025-02-12 DIAGNOSIS — G47.33 OBSTRUCTIVE SLEEP APNEA SYNDROME: Primary | ICD-10-CM

## 2025-02-12 DIAGNOSIS — R06.02 SOB (SHORTNESS OF BREATH): ICD-10-CM

## 2025-02-12 DIAGNOSIS — E66.01 MORBID OBESITY DUE TO EXCESS CALORIES: ICD-10-CM

## 2025-02-12 DIAGNOSIS — J45.909 STEROID-DEPENDENT ASTHMA, UNSPECIFIED ASTHMA SEVERITY, UNSPECIFIED WHETHER COMPLICATED, UNSPECIFIED WHETHER PERSISTENT: ICD-10-CM

## 2025-02-12 PROCEDURE — 98005 SYNCH AUDIO-VIDEO EST LOW 20: CPT | Mod: 95,,, | Performed by: INTERNAL MEDICINE

## 2025-02-12 PROCEDURE — 3008F BODY MASS INDEX DOCD: CPT | Mod: CPTII,95,, | Performed by: INTERNAL MEDICINE

## 2025-02-12 RX ORDER — PREDNISONE 20 MG/1
TABLET ORAL
Qty: 12 TABLET | Refills: 2 | Status: SHIPPED | OUTPATIENT
Start: 2025-02-12

## 2025-02-12 RX ORDER — ALBUTEROL SULFATE 90 UG/1
INHALANT RESPIRATORY (INHALATION)
Qty: 8.5 G | Refills: 11 | Status: SHIPPED | OUTPATIENT
Start: 2025-02-12

## 2025-02-12 RX ORDER — ALBUTEROL SULFATE 0.83 MG/ML
2.5 SOLUTION RESPIRATORY (INHALATION) EVERY 6 HOURS PRN
Qty: 120 EACH | Refills: 11 | Status: SHIPPED | OUTPATIENT
Start: 2025-02-12 | End: 2026-02-12

## 2025-02-12 NOTE — PATIENT INSTRUCTIONS
We discuss -- will  stop tezspire as asthma has been very stable and Dayday feels may not be needed.  May resume if prednisone needed in future    Wt loss may help asthma and sleep apnea.  Bmi is too high for Inspire device for sleep apnea    Trelegy and prednisone renewed    Cpap not being used
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
## 2025-02-18 DIAGNOSIS — R06.6 HICCUPS: ICD-10-CM

## 2025-02-18 RX ORDER — PANTOPRAZOLE SODIUM 40 MG/1
40 TABLET, DELAYED RELEASE ORAL DAILY
Qty: 30 TABLET | Refills: 11 | Status: SHIPPED | OUTPATIENT
Start: 2025-02-18 | End: 2026-02-18

## 2025-02-27 DIAGNOSIS — F32.A DEPRESSIVE DISORDER: ICD-10-CM

## 2025-02-27 DIAGNOSIS — I10 HYPERTENSION, UNSPECIFIED TYPE: ICD-10-CM

## 2025-02-27 DIAGNOSIS — E11.65 TYPE 2 DIABETES MELLITUS WITH HYPERGLYCEMIA, WITHOUT LONG-TERM CURRENT USE OF INSULIN: ICD-10-CM

## 2025-02-27 RX ORDER — METOPROLOL SUCCINATE 25 MG/1
25 TABLET, EXTENDED RELEASE ORAL NIGHTLY
Qty: 90 TABLET | Refills: 1 | OUTPATIENT
Start: 2025-02-27

## 2025-02-27 RX ORDER — BUPROPION HYDROCHLORIDE 150 MG/1
150 TABLET ORAL 2 TIMES DAILY
Qty: 180 TABLET | Refills: 1 | OUTPATIENT
Start: 2025-02-27

## 2025-02-27 RX ORDER — SEMAGLUTIDE 0.68 MG/ML
0.5 INJECTION, SOLUTION SUBCUTANEOUS
Qty: 3 ML | Refills: 5 | OUTPATIENT
Start: 2025-02-27

## 2025-03-08 DIAGNOSIS — I10 HYPERTENSION, UNSPECIFIED TYPE: ICD-10-CM

## 2025-03-08 DIAGNOSIS — F32.A DEPRESSIVE DISORDER: ICD-10-CM

## 2025-03-10 RX ORDER — METOPROLOL SUCCINATE 25 MG/1
25 TABLET, EXTENDED RELEASE ORAL NIGHTLY
Qty: 90 TABLET | Refills: 1 | OUTPATIENT
Start: 2025-03-10

## 2025-03-10 RX ORDER — BUPROPION HYDROCHLORIDE 150 MG/1
150 TABLET ORAL 2 TIMES DAILY
Qty: 180 TABLET | Refills: 1 | OUTPATIENT
Start: 2025-03-10

## 2025-03-12 ENCOUNTER — PATIENT MESSAGE (OUTPATIENT)
Dept: FAMILY MEDICINE | Facility: CLINIC | Age: 65
End: 2025-03-12
Payer: COMMERCIAL

## 2025-03-18 ENCOUNTER — PATIENT OUTREACH (OUTPATIENT)
Dept: ADMINISTRATIVE | Facility: HOSPITAL | Age: 65
End: 2025-03-18
Payer: COMMERCIAL

## 2025-03-18 NOTE — PROGRESS NOTES
Care Coordination Encounter Details:       MyChart Portal Status:         [x]  Reviewed MyChart Portal Status offered / enrolled if applicable        Additional Notes:     MyChart Outcomes: Pt is enrolled & active          Updates Requested / Reviewed:        Updated Care Coordination Note, Care Everywhere, , External Sources: [facility], [facility], and [facility], and Immunizations Reconciliation Completed or Queried: [place]         Health Maintenance Screening(s) Due:      Health Maintenance Topics Overdue:      VBHM Score: 3     Colon Cancer Screening  Urine Screening  Foot Exam    Influenza Vaccine  Pneumonia Vaccine  Tetanus Vaccine  Shingles/Zoster Vaccine  RSV Vaccine                  Health Maintenance Topic(s) Outreach Outcomes & Actions Taken:    Colorectal Cancer Screening - Outreach Outcomes & Actions Taken  : Pt Declined Completing Colorectal Cancer Screening    Lab(s) - Outreach Outcomes & Actions Taken  : Reminder added to appt notes.    Diabetic Foot Exam - Outreach Outcomes & Actions Taken  : Reminder added to appt notes.         Additional Notes:             Chronic Disease Management:     Diabetes Measures        Lab Results   Component Value Date    HGBA1C 8.7 (H) 03/15/2025           [x]  Reviewed chart for active Diabetes diagnosis     []  Scheduled necessary follow up appointments if needed         Additional Notes:             Hypertension Measures        BP Readings from Last 1 Encounters:   08/08/24 128/72           [x]  Reviewed chart for active Hypertension diagnosis     []  Reviewed & documented Home BP Cuff     []  Documented a Remote BP if needed & applicable     []  Scheduled necessary follow up appointments with Primary Care if needed         Additional Notes:             Provider Team Continuity:     Last PCP Visit Date: 8/8/2024          [x]  Reviewed Primary Care Provider Visits, Annual Wellness Visit, and Future          Appointments to ensure appointments have been  scheduled and/or           completed        Additional Notes:             Social Determinants of Health          [x]  Reviewed, completed, and/or updated the following sections:                  Food Insecurity, Transportation Needs, Financial Resource Strain,                 Tobacco Use        Additional Notes:  Spoke to pt regarding value base resources, pt declined OPCM RFL.           Care Management, Digital Medicine, and/or Education Referrals    OPCM Risk Score: 19.4         Next Steps - Referral Actions: Digital Medicine Outcomes and Actions Taken: Pt Declined or Not Eligible and Declined Diabetic Education RFL.        Additional Notes:

## 2025-04-02 DIAGNOSIS — E78.5 HYPERLIPIDEMIA, UNSPECIFIED HYPERLIPIDEMIA TYPE: ICD-10-CM

## 2025-04-02 RX ORDER — ROSUVASTATIN CALCIUM 40 MG/1
40 TABLET, COATED ORAL NIGHTLY
Qty: 30 TABLET | Refills: 0 | Status: SHIPPED | OUTPATIENT
Start: 2025-04-02

## 2025-04-09 ENCOUNTER — OFFICE VISIT (OUTPATIENT)
Dept: FAMILY MEDICINE | Facility: CLINIC | Age: 65
End: 2025-04-09
Payer: COMMERCIAL

## 2025-04-09 VITALS
BODY MASS INDEX: 40.36 KG/M2 | SYSTOLIC BLOOD PRESSURE: 110 MMHG | DIASTOLIC BLOOD PRESSURE: 70 MMHG | OXYGEN SATURATION: 98 % | HEART RATE: 91 BPM | WEIGHT: 298 LBS | HEIGHT: 72 IN

## 2025-04-09 DIAGNOSIS — E11.65 TYPE 2 DIABETES MELLITUS WITH HYPERGLYCEMIA, WITHOUT LONG-TERM CURRENT USE OF INSULIN: ICD-10-CM

## 2025-04-09 DIAGNOSIS — N28.9 FUNCTION KIDNEY DECREASED: Primary | ICD-10-CM

## 2025-04-09 DIAGNOSIS — I10 HYPERTENSION, UNSPECIFIED TYPE: ICD-10-CM

## 2025-04-09 DIAGNOSIS — E78.5 HYPERLIPIDEMIA, UNSPECIFIED HYPERLIPIDEMIA TYPE: ICD-10-CM

## 2025-04-09 DIAGNOSIS — J45.50 SEVERE PERSISTENT ASTHMA WITHOUT COMPLICATION: ICD-10-CM

## 2025-04-09 DIAGNOSIS — F32.A DEPRESSIVE DISORDER: ICD-10-CM

## 2025-04-09 DIAGNOSIS — R06.6 HICCUPS: ICD-10-CM

## 2025-04-09 DIAGNOSIS — K21.9 GASTROESOPHAGEAL REFLUX DISEASE, UNSPECIFIED WHETHER ESOPHAGITIS PRESENT: ICD-10-CM

## 2025-04-09 LAB — HBA1C MFR BLD: 7.7 %

## 2025-04-09 PROCEDURE — 3074F SYST BP LT 130 MM HG: CPT | Mod: CPTII,S$GLB,, | Performed by: NURSE PRACTITIONER

## 2025-04-09 PROCEDURE — 3078F DIAST BP <80 MM HG: CPT | Mod: CPTII,S$GLB,, | Performed by: NURSE PRACTITIONER

## 2025-04-09 PROCEDURE — 3051F HG A1C>EQUAL 7.0%<8.0%: CPT | Mod: CPTII,S$GLB,, | Performed by: NURSE PRACTITIONER

## 2025-04-09 PROCEDURE — 3008F BODY MASS INDEX DOCD: CPT | Mod: CPTII,S$GLB,, | Performed by: NURSE PRACTITIONER

## 2025-04-09 PROCEDURE — 1160F RVW MEDS BY RX/DR IN RCRD: CPT | Mod: CPTII,S$GLB,, | Performed by: NURSE PRACTITIONER

## 2025-04-09 PROCEDURE — 99214 OFFICE O/P EST MOD 30 MIN: CPT | Mod: S$GLB,,, | Performed by: NURSE PRACTITIONER

## 2025-04-09 PROCEDURE — 1159F MED LIST DOCD IN RCRD: CPT | Mod: CPTII,S$GLB,, | Performed by: NURSE PRACTITIONER

## 2025-04-09 RX ORDER — PANTOPRAZOLE SODIUM 40 MG/1
40 TABLET, DELAYED RELEASE ORAL DAILY
Qty: 90 TABLET | Refills: 1 | Status: SHIPPED | OUTPATIENT
Start: 2025-04-09 | End: 2026-04-09

## 2025-04-09 RX ORDER — BUDESONIDE, GLYCOPYRROLATE, AND FORMOTEROL FUMARATE 160; 9; 4.8 UG/1; UG/1; UG/1
1 AEROSOL, METERED RESPIRATORY (INHALATION) 2 TIMES DAILY
Start: 2025-04-09 | End: 2025-05-02 | Stop reason: SDUPTHER

## 2025-04-09 RX ORDER — TADALAFIL 20 MG/1
TABLET ORAL
Qty: 40 TABLET | Refills: 4 | Status: SHIPPED | OUTPATIENT
Start: 2025-04-09

## 2025-04-09 RX ORDER — ROSUVASTATIN CALCIUM 40 MG/1
40 TABLET, COATED ORAL NIGHTLY
Qty: 90 TABLET | Refills: 0 | Status: SHIPPED | OUTPATIENT
Start: 2025-04-09

## 2025-04-09 RX ORDER — SEMAGLUTIDE 0.68 MG/ML
0.5 INJECTION, SOLUTION SUBCUTANEOUS
Qty: 3 ML | Refills: 5 | Status: CANCELLED | OUTPATIENT
Start: 2025-04-09

## 2025-04-09 RX ORDER — LOSARTAN POTASSIUM AND HYDROCHLOROTHIAZIDE 12.5; 5 MG/1; MG/1
1 TABLET ORAL DAILY
Qty: 180 TABLET | Refills: 1 | Status: SHIPPED | OUTPATIENT
Start: 2025-04-09

## 2025-04-09 RX ORDER — METOPROLOL SUCCINATE 25 MG/1
25 TABLET, EXTENDED RELEASE ORAL NIGHTLY
Qty: 90 TABLET | Refills: 1 | Status: SHIPPED | OUTPATIENT
Start: 2025-04-09

## 2025-04-09 RX ORDER — BUPROPION HYDROCHLORIDE 150 MG/1
150 TABLET ORAL 2 TIMES DAILY
Qty: 180 TABLET | Refills: 0 | Status: SHIPPED | OUTPATIENT
Start: 2025-04-09

## 2025-04-13 LAB
ALBUMIN SERPL-MCNC: 4.3 G/DL (ref 3.9–4.9)
ALP SERPL-CCNC: 137 IU/L (ref 44–121)
ALT SERPL-CCNC: 28 IU/L (ref 0–44)
AST SERPL-CCNC: 30 IU/L (ref 0–40)
BILIRUB SERPL-MCNC: 0.4 MG/DL (ref 0–1.2)
BUN SERPL-MCNC: 12 MG/DL (ref 8–27)
BUN/CREAT SERPL: 9 (ref 10–24)
CALCIUM SERPL-MCNC: 9.7 MG/DL (ref 8.6–10.2)
CHLORIDE SERPL-SCNC: 98 MMOL/L (ref 96–106)
CO2 SERPL-SCNC: 27 MMOL/L (ref 20–29)
CREAT SERPL-MCNC: 1.32 MG/DL (ref 0.76–1.27)
EST. GFR  (NO RACE VARIABLE): 60 ML/MIN/1.73
GLOBULIN SER CALC-MCNC: 2.4 G/DL (ref 1.5–4.5)
GLUCOSE SERPL-MCNC: 155 MG/DL (ref 70–99)
POTASSIUM SERPL-SCNC: 4 MMOL/L (ref 3.5–5.2)
PROT SERPL-MCNC: 6.7 G/DL (ref 6–8.5)
SODIUM SERPL-SCNC: 141 MMOL/L (ref 134–144)

## 2025-04-28 PROBLEM — E11.40 TYPE 2 DIABETES MELLITUS WITH DIABETIC NEUROPATHY, WITHOUT LONG-TERM CURRENT USE OF INSULIN: Status: ACTIVE | Noted: 2025-04-28

## 2025-04-28 NOTE — PROGRESS NOTES
SUBJECTIVE:    Patient ID: Dayday Rodriguez is a 64 y.o. male.    Chief Complaint: Follow-up (6 mo follow up/ lab work in Epic/ declines foot exam and colo referral/ discuss surgical clearance for neck surgery for spinal cord pinching in 4 different locations over the summer, with Dr. An//mp)      History of Present Illness    CHIEF COMPLAINT:  64 year old male presents today for follow up   MUSCULOSKELETAL:  He reports some relief following recent epidural steroid injection and is scheduled for another injection on the 23rd.    RECENT FLU ILLNESS:  He experienced severe illness 3-4 weeks ago with extreme fatigue and difficulty breathing, diagnosed as influenza A. He received nebulizer treatment with albuterol from Dr. Juarez which was effective. He reports occasional persistent symptoms, using albuterol mist as needed. He has Aerosuper as rescue medication for use only when absolutely necessary.    DIABETES:  He reports home blood sugar readings are usually around 144. He continues Ozempic 0.5 mg for diabetes management.    MEDICATIONS:  He continues Trelegy and Losartan with HCTZ for blood pressure management. He reports intolerance to Celebrex due to hiccups that resolved upon discontinuation, and intolerance to Ibuprofen 800 mg due to stomach irritation.    SLEEP APNEA:  He has sleep apnea managed with CPAP machine.      ROS:  General: -fever, -chills, -fatigue, -weight gain, -weight loss  Eyes: -vision changes, -redness, -discharge  ENT: -ear pain, -nasal congestion, -sore throat  Cardiovascular: -chest pain, -palpitations, -lower extremity edema  Respiratory: -cough, -shortness of breath, +apnea, +intermittent breathing while sleeping, +difficulty breathing  Gastrointestinal: -abdominal pain, -nausea, -vomiting, -diarrhea, -constipation, -blood in stool  Genitourinary: -dysuria, -hematuria, -frequency  Musculoskeletal: -joint pain, -muscle pain, +back pain, +neck pain  Skin: -rash, -lesion  Neurological:  -headache, -dizziness, -numbness, +tingling  Psychiatric: -anxiety, -depression, -sleep difficulty              Office Visit on 04/09/2025   Component Date Value Ref Range Status    Glucose 04/12/2025 155 (H)  70 - 99 mg/dL Final    BUN 04/12/2025 12  8 - 27 mg/dL Final    Creatinine 04/12/2025 1.32 (H)  0.76 - 1.27 mg/dL Final    eGFR 04/12/2025 60  >59 mL/min/1.73 Final    BUN/Creatinine Ratio 04/12/2025 9 (L)  10 - 24 Final    Sodium 04/12/2025 141  134 - 144 mmol/L Final    Potassium 04/12/2025 4.0  3.5 - 5.2 mmol/L Final    Chloride 04/12/2025 98  96 - 106 mmol/L Final    CO2 04/12/2025 27  20 - 29 mmol/L Final    Calcium 04/12/2025 9.7  8.6 - 10.2 mg/dL Final    Protein, Total 04/12/2025 6.7  6.0 - 8.5 g/dL Final    Albumin 04/12/2025 4.3  3.9 - 4.9 g/dL Final    Globulin, Total 04/12/2025 2.4  1.5 - 4.5 g/dL Final    Total Bilirubin 04/12/2025 0.4  0.0 - 1.2 mg/dL Final    Alkaline Phosphatase 04/12/2025 137 (H)  44 - 121 IU/L Final    AST 04/12/2025 30  0 - 40 IU/L Final    ALT 04/12/2025 28  0 - 44 IU/L Final    Hemoglobin A1C, POC 04/09/2025 7.7  % Final   Patient Outreach on 10/17/2024   Component Date Value Ref Range Status    Left Eye DM Retinopathy 09/12/2024 Negative   Final    Right Eye DM Retinopathy 09/12/2024 Negative   Final       Past Medical History:   Diagnosis Date    Coronary artery disease     Diabetes mellitus 07/1922    pt on metformin in past, states DM resolved so no longer taking    Hypertension     MI (myocardial infarction)      Past Surgical History:   Procedure Laterality Date    cardiac stents      x 7    SHOULDER SURGERY Right 1975     No family history on file.    Tests to Keep You Healthy    Eye Exam: Met on 1/14/2025  Colon Cancer Screening: ORDERED  Last Blood Pressure <= 139/89 (4/9/2025): Yes  Last HbA1c < 8 (04/09/2025): Yes      The CVD Risk score (Willie, et al., 2008) failed to calculate for the following reasons:    The patient has a prior MI, stroke, CHF, or  peripheral vascular disease diagnosis     Marital Status:   Alcohol History:  reports current alcohol use.  Tobacco History:  reports that he has quit smoking. He has been exposed to tobacco smoke. He has never used smokeless tobacco.  Drug History:  reports no history of drug use.    Health Maintenance Topics with due status: Not Due       Topic Last Completion Date    Diabetic Eye Exam 01/14/2025    Lipid Panel 03/15/2025    High Dose Statin 04/09/2025    Hemoglobin A1c 04/09/2025    Foot Exam 04/28/2025     Immunization History   Administered Date(s) Administered    COVID-19, vector-nr, rS-Ad26, PF ([facility]) 04/08/2021    Td (ADULT) 09/16/2005       Review of patient's allergies indicates:  No Known Allergies  Current Medications[1]        Objective:      Vitals:    04/09/25 1249   BP: 110/70   Pulse: 91   SpO2: 98%   Weight: 135.2 kg (298 lb)   Height: 6' (1.829 m)       Physical Exam  Vitals and nursing note reviewed.   Constitutional:       General: He is not in acute distress.     Appearance: Normal appearance. He is well-developed. He is obese.   HENT:      Head: Normocephalic and atraumatic.      Right Ear: External ear normal.      Left Ear: External ear normal.   Eyes:      Pupils: Pupils are equal, round, and reactive to light.   Neck:      Trachea: No tracheal deviation.   Cardiovascular:      Rate and Rhythm: Normal rate and regular rhythm.      Heart sounds: No murmur heard.     No friction rub. No gallop.   Pulmonary:      Breath sounds: Normal breath sounds. No stridor. No wheezing or rales.   Abdominal:      Palpations: Abdomen is soft. There is no mass.      Tenderness: There is no abdominal tenderness.   Musculoskeletal:         General: No tenderness or deformity.      Cervical back: Neck supple.   Feet:      Right foot:      Protective Sensation: 5 sites tested.  5 sites sensed.      Left foot:      Protective Sensation: 5 sites tested.  5 sites sensed.   Lymphadenopathy:      Cervical:  No cervical adenopathy.   Skin:     General: Skin is warm and dry.   Neurological:      Mental Status: He is alert and oriented to person, place, and time.      Coordination: Coordination normal.   Psychiatric:         Thought Content: Thought content normal.            Assessment:       1. Function kidney decreased    2. Depressive disorder    3. Hypertension, unspecified type    4. Hypertension, unspecified type    5. Hyperlipidemia, unspecified hyperlipidemia type    6. Type 2 diabetes mellitus with hyperglycemia, without long-term current use of insulin    7. Hiccups    8. Gastroesophageal reflux disease, unspecified whether esophagitis present    9. Severe persistent asthma without complication           Assessment & Plan    - A1C 8.7, indicating suboptimal glycemic control.  - Renal function filtration rate dropped to 53, possibly due to dehydration or medication effects.  - Lung function appeared good on exam.  - Cholesterol levels improved in HDL and LDL.    TYPE 2 DIABETES MELLITUS WITH DIABETIC NEUROPATHY, WITHOUT LONG-TERM CURRENT USE OF INSULIN:  - Monitored the patient's A1C, which improved from an initial 8.7 (corresponding to an average blood sugar of approximately 205 mg/dL) to 7.5 on repeat testing.  - The highest home blood sugar reading was around 144 mg/dL.  - While the improvement is notable, the current A1C of 7.5 remains above the target for surgery.  - Emphasized the importance of maintaining A1C below 7.5 for surgical clearance and to reduce the risk of post-operative infections.  - To achieve better glycemic control before the planned summer surgery, changed medication from Ozempic 0.5mg weekly to Mounjaro 5 mg weekly injection, which may also promote weight loss.  - This change is pending insurance coverage and copay amount.    BACK AND NECK PAIN (DORSALGIA):  - Reviewed MRI results showing cervical spine compression: C2 slightly compressed, C3 no spinal fluid around it, C4  semi-compressed, C5 totally compressed.  - Dayday received an epidural steroid injection for back pain, with another scheduled for the 23rd.  - Considering surgery for the summer, pending glucose control.    HYPERTENSION:  - Continued Losartan HCTZ (Hyzaar) for blood pressure management.    SLEEP APNEA:  - Confirmed patient has sleep apnea and uses a CPAP machine for management.    RESPIRATORY ISSUES:  - Changed from Trelegy to Breo Ellipta due to potential cost savings and patient preference.      MEDICATION ALLERGY:  - Noted patient's inability to take certain pain medications due to side effects.  - Advised avoidance of these medications due to adverse reactions.    GENERAL HEALTH:  - Recommend increasing water intake to improve hydration status and renal function.        Plan:       1. Function kidney decreased  -     Comprehensive Metabolic Panel; Future; Expected date: 04/09/2025    2. Depressive disorder  Comments:  wellbutrin  Orders:  -     buPROPion (WELLBUTRIN XL) 150 MG TB24 tablet; Take 1 tablet (150 mg total) by mouth 2 (two) times daily.  Dispense: 180 tablet; Refill: 0    3. Hypertension, unspecified type  -     losartan-hydrochlorothiazide 50-12.5 mg (HYZAAR) 50-12.5 mg per tablet; Take 1 tablet by mouth once daily.  Dispense: 180 tablet; Refill: 1  -     metoprolol succinate (TOPROL-XL) 25 MG 24 hr tablet; Take 1 tablet (25 mg total) by mouth nightly.  Dispense: 90 tablet; Refill: 1    4. Hypertension, unspecified type  Comments:  monitor bp daily. resume previous meds. readings to next ov.   Orders:  -     losartan-hydrochlorothiazide 50-12.5 mg (HYZAAR) 50-12.5 mg per tablet; Take 1 tablet by mouth once daily.  Dispense: 180 tablet; Refill: 1  -     metoprolol succinate (TOPROL-XL) 25 MG 24 hr tablet; Take 1 tablet (25 mg total) by mouth nightly.  Dispense: 90 tablet; Refill: 1    5. Hyperlipidemia, unspecified hyperlipidemia type  Comments:  crestor  Orders:  -     rosuvastatin (CRESTOR) 40 MG  Tab; Take 1 tablet (40 mg total) by mouth every evening.  Dispense: 90 tablet; Refill: 0    6. Type 2 diabetes mellitus with hyperglycemia, without long-term current use of insulin  Comments:  hga1c 7.7. mounjaro 7.5mg  Orders:  -     tirzepatide 7.5 mg/0.5 mL PnIj; Inject 7.5 mg into the skin every 7 days.  Dispense: 2 mL; Refill: 2  -     POCT HEMOGLOBIN A1C  -     Foot Exam Performed    7. Hiccups  -     pantoprazole (PROTONIX) 40 MG tablet; Take 1 tablet (40 mg total) by mouth once daily.  Dispense: 90 tablet; Refill: 1    8. Gastroesophageal reflux disease, unspecified whether esophagitis present  Comments:  protonix    9. Severe persistent asthma without complication  Comments:  breztri    Other orders  -     tadalafiL (CIALIS) 20 MG Tab; take 1 tablet by mouth every other day  Dispense: 40 tablet; Refill: 4  -     budesonide-glycopyr-formoterol (BREZTRI AEROSPHERE) 160-9-4.8 mcg/actuation HFAA; Inhale 1 puff into the lungs 2 (two) times daily.      Follow up in about 3 months (around 7/9/2025), or if symptoms worsen or fail to improve, for medication management, Diabetic Check-Up.          Counseled on age and gender appropriate medical preventative services, including cancer screenings, immunizations, overall nutritional health, need for a consistent exercise regimen and an overall push towards maintaining a vigorous and active lifestyle.      This note was generated with the assistance of ambient listening technology. Verbal consent was obtained by the patient and accompanying visitor(s) for the recording of patient appointment to facilitate this note. I attest to having reviewed and edited the generated note for accuracy, though some syntax or spelling errors may persist. Please contact the author of this note for any clarification.       4/28/2025 Melissa Thurston NP         [1]   Current Outpatient Medications:     albuterol (PROVENTIL) 2.5 mg /3 mL (0.083 %) nebulizer solution, Take 3 mLs (2.5 mg total) by  nebulization every 6 (six) hours as needed., Disp: 120 each, Rfl: 11    albuterol (PROVENTIL/VENTOLIN HFA) 90 mcg/actuation inhaler, 2 puffs every 4 hours as needed for cough, wheeze, or shortness of breath, Disp: 8.5 g, Rfl: 11    albuterol-budesonide (AIRSUPRA) 90-80 mcg/actuation, Inhale 2 puffs into the lungs every 4 (four) hours as needed (short breath)., Disp: 10.7 g, Rfl: 11    azelastine (ASTELIN) 137 mcg (0.1 %) nasal spray, 2 sprays (274 mcg total) by Nasal route 2 (two) times daily., Disp: 30 mL, Rfl: 11    celecoxib (CELEBREX) 200 MG capsule, 1 Capsule Every 12 Hours, Disp: 60 capsule, Rfl: 0    cyclobenzaprine (FLEXERIL) 10 MG tablet, 1 Tablet Twice A Day, Disp: 60 tablet, Rfl: 0    fluticasone propionate (FLONASE) 50 mcg/actuation nasal spray, 1 spray (50 mcg total) by Each Nostril route once daily., Disp: 16 g, Rfl: 11    fluticasone-umeclidin-vilanter (TRELEGY ELLIPTA) 200-62.5-25 mcg inhaler, inhale 1 puff by mouth into the lungs once daily, Disp: 180 each, Rfl: 3    HYDROcodone-acetaminophen (NORCO)  mg per tablet, 1 Tablet Twice Daily As Needed for Pain, Disp: 40 tablet, Rfl: 0    latanoprost 0.005 % ophthalmic solution, instill 1 drop into both eyes every night, Disp: 2.5 mL, Rfl: 10    predniSONE (DELTASONE) 20 MG tablet, Take one daily for 3 days and may repeat for shortness of breath. (Patient taking differently: Take one daily for 3 days and may repeat for shortness of breath. PRN), Disp: 12 tablet, Rfl: 2    tamsulosin (FLOMAX) 0.4 mg Cap, take 1 capsule by mouth once daily, Disp: 90 capsule, Rfl: 5    budesonide-glycopyr-formoterol (BREZTRI AEROSPHERE) 160-9-4.8 mcg/actuation HFAA, Inhale 1 puff into the lungs 2 (two) times daily., Disp: , Rfl:     buPROPion (WELLBUTRIN XL) 150 MG TB24 tablet, Take 1 tablet (150 mg total) by mouth 2 (two) times daily., Disp: 180 tablet, Rfl: 0    losartan-hydrochlorothiazide 50-12.5 mg (HYZAAR) 50-12.5 mg per tablet, Take 1 tablet by mouth once  daily., Disp: 180 tablet, Rfl: 1    metoprolol succinate (TOPROL-XL) 25 MG 24 hr tablet, Take 1 tablet (25 mg total) by mouth nightly., Disp: 90 tablet, Rfl: 1    pantoprazole (PROTONIX) 40 MG tablet, Take 1 tablet (40 mg total) by mouth once daily., Disp: 90 tablet, Rfl: 1    rosuvastatin (CRESTOR) 40 MG Tab, Take 1 tablet (40 mg total) by mouth every evening., Disp: 90 tablet, Rfl: 0    tadalafiL (CIALIS) 20 MG Tab, take 1 tablet by mouth before sex every 3 days, Disp: 24 tablet, Rfl: 5    tadalafiL (CIALIS) 20 MG Tab, Take 1 tablet (20 mg total) by mouth before sex every 3 days., Disp: 22 tablet, Rfl: 4    tadalafiL (CIALIS) 20 MG Tab, take 1 tablet by mouth every other day, Disp: 40 tablet, Rfl: 4    tamsulosin (FLOMAX) 0.4 mg Cap, Take 1 capsule (0.4 mg total) by mouth once daily., Disp: 90 capsule, Rfl: 4    tirzepatide 7.5 mg/0.5 mL PnIj, Inject 7.5 mg into the skin every 7 days., Disp: 2 mL, Rfl: 2

## 2025-05-02 RX ORDER — BUDESONIDE, GLYCOPYRROLATE, AND FORMOTEROL FUMARATE 160; 9; 4.8 UG/1; UG/1; UG/1
1 AEROSOL, METERED RESPIRATORY (INHALATION) 2 TIMES DAILY
Qty: 10.7 G | Refills: 2 | Status: SHIPPED | OUTPATIENT
Start: 2025-05-02

## 2025-06-05 ENCOUNTER — PATIENT MESSAGE (OUTPATIENT)
Dept: FAMILY MEDICINE | Facility: CLINIC | Age: 65
End: 2025-06-05
Payer: COMMERCIAL

## 2025-06-05 DIAGNOSIS — E11.65 TYPE 2 DIABETES MELLITUS WITH HYPERGLYCEMIA, WITHOUT LONG-TERM CURRENT USE OF INSULIN: Primary | ICD-10-CM

## 2025-06-05 RX ORDER — TIRZEPATIDE 5 MG/.5ML
5 INJECTION, SOLUTION SUBCUTANEOUS
Qty: 2 ML | Refills: 2 | Status: SHIPPED | OUTPATIENT
Start: 2025-06-05

## 2025-07-07 ENCOUNTER — LAB VISIT (OUTPATIENT)
Dept: LAB | Facility: HOSPITAL | Age: 65
End: 2025-07-07
Attending: INTERNAL MEDICINE
Payer: COMMERCIAL

## 2025-07-07 DIAGNOSIS — I42.8 OBSCURE CARDIOMYOPATHY OF AFRICA: Primary | ICD-10-CM

## 2025-07-07 DIAGNOSIS — E11.9 DIABETES MELLITUS WITHOUT COMPLICATION: ICD-10-CM

## 2025-07-07 DIAGNOSIS — I20.0 INTERMEDIATE CORONARY SYNDROME: ICD-10-CM

## 2025-07-07 DIAGNOSIS — E66.09 EXOGENOUS OBESITY: ICD-10-CM

## 2025-07-07 DIAGNOSIS — I10 ESSENTIAL HYPERTENSION, MALIGNANT: ICD-10-CM

## 2025-07-07 DIAGNOSIS — I25.10 CORONARY ATHEROSCLEROSIS OF NATIVE CORONARY ARTERY: ICD-10-CM

## 2025-07-07 LAB
ALBUMIN SERPL-MCNC: 4.1 G/DL (ref 3.5–5.2)
ALP SERPL-CCNC: 112 UNIT/L (ref 55–135)
ALT SERPL-CCNC: 28 UNIT/L (ref 10–44)
ANION GAP (SMH): 9 MMOL/L (ref 8–16)
AST SERPL-CCNC: 24 UNIT/L (ref 10–40)
BILIRUB SERPL-MCNC: 0.7 MG/DL (ref 0.1–1)
BUN SERPL-MCNC: 14 MG/DL (ref 8–23)
CALCIUM SERPL-MCNC: 9.4 MG/DL (ref 8.7–10.5)
CHLORIDE SERPL-SCNC: 100 MMOL/L (ref 95–110)
CO2 SERPL-SCNC: 29 MMOL/L (ref 23–29)
CREAT SERPL-MCNC: 1.4 MG/DL (ref 0.5–1.4)
ERYTHROCYTE [DISTWIDTH] IN BLOOD BY AUTOMATED COUNT: 13.5 % (ref 11.5–14.5)
GFR SERPLBLD CREATININE-BSD FMLA CKD-EPI: 56 ML/MIN/1.73/M2
GLUCOSE SERPL-MCNC: 217 MG/DL (ref 70–110)
HCT VFR BLD AUTO: 47.5 % (ref 40–54)
HGB BLD-MCNC: 16.2 GM/DL (ref 14–18)
MCH RBC QN AUTO: 28.8 PG (ref 27–31)
MCHC RBC AUTO-ENTMCNC: 34.1 G/DL (ref 32–36)
MCV RBC AUTO: 84 FL (ref 82–98)
PLATELET # BLD AUTO: 200 K/UL (ref 150–450)
PMV BLD AUTO: 9.9 FL (ref 9.2–12.9)
POTASSIUM SERPL-SCNC: 3.9 MMOL/L (ref 3.5–5.1)
PROT SERPL-MCNC: 6.9 GM/DL (ref 6–8.4)
RBC # BLD AUTO: 5.63 M/UL (ref 4.6–6.2)
SODIUM SERPL-SCNC: 138 MMOL/L (ref 136–145)
WBC # BLD AUTO: 6.85 K/UL (ref 3.9–12.7)

## 2025-07-07 PROCEDURE — 85027 COMPLETE CBC AUTOMATED: CPT

## 2025-07-07 PROCEDURE — 36415 COLL VENOUS BLD VENIPUNCTURE: CPT

## 2025-07-07 PROCEDURE — 82247 BILIRUBIN TOTAL: CPT

## 2025-07-15 DIAGNOSIS — E78.5 HYPERLIPIDEMIA, UNSPECIFIED HYPERLIPIDEMIA TYPE: ICD-10-CM

## 2025-07-15 RX ORDER — ROSUVASTATIN CALCIUM 40 MG/1
40 TABLET, COATED ORAL NIGHTLY
Qty: 90 TABLET | Refills: 0 | OUTPATIENT
Start: 2025-07-15

## 2025-07-21 DIAGNOSIS — E78.5 HYPERLIPIDEMIA, UNSPECIFIED HYPERLIPIDEMIA TYPE: ICD-10-CM

## 2025-07-21 RX ORDER — ROSUVASTATIN CALCIUM 40 MG/1
40 TABLET, COATED ORAL NIGHTLY
Qty: 90 TABLET | Refills: 0 | Status: SHIPPED | OUTPATIENT
Start: 2025-07-21

## 2025-07-27 DIAGNOSIS — F32.A DEPRESSIVE DISORDER: ICD-10-CM

## 2025-07-28 RX ORDER — BUPROPION HYDROCHLORIDE 150 MG/1
150 TABLET ORAL 2 TIMES DAILY
Qty: 180 TABLET | Refills: 0 | Status: SHIPPED | OUTPATIENT
Start: 2025-07-28

## 2025-08-11 DIAGNOSIS — E11.65 TYPE 2 DIABETES MELLITUS WITH HYPERGLYCEMIA, WITHOUT LONG-TERM CURRENT USE OF INSULIN: ICD-10-CM

## 2025-08-12 RX ORDER — BUDESONIDE, GLYCOPYRROLATE, AND FORMOTEROL FUMARATE 160; 9; 4.8 UG/1; UG/1; UG/1
1 AEROSOL, METERED RESPIRATORY (INHALATION) 2 TIMES DAILY
Qty: 10.7 G | Refills: 2 | Status: SHIPPED | OUTPATIENT
Start: 2025-08-12

## 2025-08-12 RX ORDER — TIRZEPATIDE 5 MG/.5ML
5 INJECTION, SOLUTION SUBCUTANEOUS
Qty: 2 ML | Refills: 2 | Status: SHIPPED | OUTPATIENT
Start: 2025-08-12